# Patient Record
Sex: FEMALE | Race: WHITE | Employment: OTHER | URBAN - METROPOLITAN AREA
[De-identification: names, ages, dates, MRNs, and addresses within clinical notes are randomized per-mention and may not be internally consistent; named-entity substitution may affect disease eponyms.]

---

## 2017-02-27 ENCOUNTER — OFFICE VISIT (OUTPATIENT)
Dept: FAMILY MEDICINE CLINIC | Age: 72
End: 2017-02-27

## 2017-02-27 VITALS
HEART RATE: 68 BPM | SYSTOLIC BLOOD PRESSURE: 134 MMHG | OXYGEN SATURATION: 97 % | WEIGHT: 172.4 LBS | DIASTOLIC BLOOD PRESSURE: 70 MMHG | BODY MASS INDEX: 30.3 KG/M2

## 2017-02-27 DIAGNOSIS — I10 ESSENTIAL HYPERTENSION: ICD-10-CM

## 2017-02-27 DIAGNOSIS — E78.00 HYPERCHOLESTEREMIA: ICD-10-CM

## 2017-02-27 DIAGNOSIS — E55.9 VITAMIN D DEFICIENCY: ICD-10-CM

## 2017-02-27 DIAGNOSIS — E11.42 TYPE 2 DIABETES MELLITUS WITH DIABETIC POLYNEUROPATHY, WITHOUT LONG-TERM CURRENT USE OF INSULIN (HCC): Primary | ICD-10-CM

## 2017-02-27 LAB
ALT SERPL-CCNC: 13 U/L (ref 10–40)
ANION GAP SERPL CALCULATED.3IONS-SCNC: 16 MMOL/L (ref 3–16)
BUN BLDV-MCNC: 13 MG/DL (ref 7–20)
CALCIUM SERPL-MCNC: 9.5 MG/DL (ref 8.3–10.6)
CHLORIDE BLD-SCNC: 102 MMOL/L (ref 99–110)
CHOLESTEROL, TOTAL: 171 MG/DL (ref 0–199)
CO2: 23 MMOL/L (ref 21–32)
CREAT SERPL-MCNC: 0.7 MG/DL (ref 0.6–1.2)
GFR AFRICAN AMERICAN: >60
GFR NON-AFRICAN AMERICAN: >60
GLUCOSE BLD-MCNC: 148 MG/DL (ref 70–99)
HDLC SERPL-MCNC: 40 MG/DL (ref 40–60)
LDL CHOLESTEROL CALCULATED: 97 MG/DL
POTASSIUM SERPL-SCNC: 4.4 MMOL/L (ref 3.5–5.1)
SODIUM BLD-SCNC: 141 MMOL/L (ref 136–145)
TRIGL SERPL-MCNC: 168 MG/DL (ref 0–150)
TSH REFLEX: 2.86 UIU/ML (ref 0.27–4.2)
VITAMIN D 25-HYDROXY: 37.2 NG/ML
VLDLC SERPL CALC-MCNC: 34 MG/DL

## 2017-02-27 PROCEDURE — 1036F TOBACCO NON-USER: CPT | Performed by: FAMILY MEDICINE

## 2017-02-27 PROCEDURE — G8419 CALC BMI OUT NRM PARAM NOF/U: HCPCS | Performed by: FAMILY MEDICINE

## 2017-02-27 PROCEDURE — 4040F PNEUMOC VAC/ADMIN/RCVD: CPT | Performed by: FAMILY MEDICINE

## 2017-02-27 PROCEDURE — 1123F ACP DISCUSS/DSCN MKR DOCD: CPT | Performed by: FAMILY MEDICINE

## 2017-02-27 PROCEDURE — 3014F SCREEN MAMMO DOC REV: CPT | Performed by: FAMILY MEDICINE

## 2017-02-27 PROCEDURE — G8427 DOCREV CUR MEDS BY ELIG CLIN: HCPCS | Performed by: FAMILY MEDICINE

## 2017-02-27 PROCEDURE — 3044F HG A1C LEVEL LT 7.0%: CPT | Performed by: FAMILY MEDICINE

## 2017-02-27 PROCEDURE — 36415 COLL VENOUS BLD VENIPUNCTURE: CPT | Performed by: FAMILY MEDICINE

## 2017-02-27 PROCEDURE — 1090F PRES/ABSN URINE INCON ASSESS: CPT | Performed by: FAMILY MEDICINE

## 2017-02-27 PROCEDURE — G8484 FLU IMMUNIZE NO ADMIN: HCPCS | Performed by: FAMILY MEDICINE

## 2017-02-27 PROCEDURE — G8399 PT W/DXA RESULTS DOCUMENT: HCPCS | Performed by: FAMILY MEDICINE

## 2017-02-27 PROCEDURE — 99214 OFFICE O/P EST MOD 30 MIN: CPT | Performed by: FAMILY MEDICINE

## 2017-02-27 PROCEDURE — 3017F COLORECTAL CA SCREEN DOC REV: CPT | Performed by: FAMILY MEDICINE

## 2017-02-28 ENCOUNTER — TELEPHONE (OUTPATIENT)
Dept: FAMILY MEDICINE CLINIC | Age: 72
End: 2017-02-28

## 2017-02-28 LAB
ESTIMATED AVERAGE GLUCOSE: 145.6 MG/DL
HBA1C MFR BLD: 6.7 %

## 2017-02-28 RX ORDER — GLIPIZIDE 5 MG/1
15 TABLET ORAL
Qty: 180 TABLET | Refills: 5 | Status: SHIPPED | OUTPATIENT
Start: 2017-02-28 | End: 2017-10-31

## 2017-04-11 RX ORDER — ATORVASTATIN CALCIUM 20 MG/1
TABLET, FILM COATED ORAL
Qty: 90 TABLET | Refills: 1 | Status: SHIPPED | OUTPATIENT
Start: 2017-04-11 | End: 2017-11-13 | Stop reason: DRUGHIGH

## 2017-05-01 RX ORDER — ATENOLOL 25 MG/1
TABLET ORAL
Qty: 90 TABLET | Refills: 1 | Status: SHIPPED | OUTPATIENT
Start: 2017-05-01 | End: 2017-11-21 | Stop reason: SDUPTHER

## 2017-05-03 ENCOUNTER — TELEPHONE (OUTPATIENT)
Dept: FAMILY MEDICINE CLINIC | Age: 72
End: 2017-05-03

## 2017-05-03 RX ORDER — AZITHROMYCIN 250 MG/1
TABLET, FILM COATED ORAL
Qty: 1 PACKET | Refills: 0 | Status: SHIPPED | OUTPATIENT
Start: 2017-05-03 | End: 2017-05-13

## 2017-05-12 ENCOUNTER — OFFICE VISIT (OUTPATIENT)
Dept: FAMILY MEDICINE CLINIC | Age: 72
End: 2017-05-12

## 2017-05-12 VITALS
HEART RATE: 58 BPM | DIASTOLIC BLOOD PRESSURE: 58 MMHG | SYSTOLIC BLOOD PRESSURE: 138 MMHG | OXYGEN SATURATION: 97 % | WEIGHT: 174.8 LBS | BODY MASS INDEX: 30.72 KG/M2 | TEMPERATURE: 97.7 F

## 2017-05-12 DIAGNOSIS — M79.672 LEFT FOOT PAIN: ICD-10-CM

## 2017-05-12 DIAGNOSIS — E11.42 TYPE 2 DIABETES MELLITUS WITH DIABETIC POLYNEUROPATHY, WITHOUT LONG-TERM CURRENT USE OF INSULIN (HCC): ICD-10-CM

## 2017-05-12 DIAGNOSIS — J01.00 ACUTE NON-RECURRENT MAXILLARY SINUSITIS: Primary | ICD-10-CM

## 2017-05-12 PROCEDURE — G8399 PT W/DXA RESULTS DOCUMENT: HCPCS | Performed by: FAMILY MEDICINE

## 2017-05-12 PROCEDURE — 1036F TOBACCO NON-USER: CPT | Performed by: FAMILY MEDICINE

## 2017-05-12 PROCEDURE — 1123F ACP DISCUSS/DSCN MKR DOCD: CPT | Performed by: FAMILY MEDICINE

## 2017-05-12 PROCEDURE — G8419 CALC BMI OUT NRM PARAM NOF/U: HCPCS | Performed by: FAMILY MEDICINE

## 2017-05-12 PROCEDURE — G8427 DOCREV CUR MEDS BY ELIG CLIN: HCPCS | Performed by: FAMILY MEDICINE

## 2017-05-12 PROCEDURE — 1090F PRES/ABSN URINE INCON ASSESS: CPT | Performed by: FAMILY MEDICINE

## 2017-05-12 PROCEDURE — 4040F PNEUMOC VAC/ADMIN/RCVD: CPT | Performed by: FAMILY MEDICINE

## 2017-05-12 PROCEDURE — 99214 OFFICE O/P EST MOD 30 MIN: CPT | Performed by: FAMILY MEDICINE

## 2017-05-12 PROCEDURE — 3014F SCREEN MAMMO DOC REV: CPT | Performed by: FAMILY MEDICINE

## 2017-05-12 PROCEDURE — 3044F HG A1C LEVEL LT 7.0%: CPT | Performed by: FAMILY MEDICINE

## 2017-05-12 PROCEDURE — 3017F COLORECTAL CA SCREEN DOC REV: CPT | Performed by: FAMILY MEDICINE

## 2017-05-12 RX ORDER — DOXYCYCLINE HYCLATE 100 MG/1
100 CAPSULE ORAL 2 TIMES DAILY
Qty: 20 CAPSULE | Refills: 0 | Status: SHIPPED | OUTPATIENT
Start: 2017-05-12 | End: 2017-05-22

## 2017-05-12 RX ORDER — PREDNISONE 20 MG/1
20 TABLET ORAL DAILY
Qty: 10 TABLET | Refills: 0 | Status: SHIPPED | OUTPATIENT
Start: 2017-05-12 | End: 2017-05-22

## 2017-06-27 ENCOUNTER — OFFICE VISIT (OUTPATIENT)
Dept: FAMILY MEDICINE CLINIC | Age: 72
End: 2017-06-27

## 2017-06-27 VITALS
BODY MASS INDEX: 30.41 KG/M2 | OXYGEN SATURATION: 96 % | SYSTOLIC BLOOD PRESSURE: 120 MMHG | DIASTOLIC BLOOD PRESSURE: 70 MMHG | WEIGHT: 171.6 LBS | HEIGHT: 63 IN | TEMPERATURE: 97.9 F | HEART RATE: 51 BPM

## 2017-06-27 DIAGNOSIS — Z12.31 SCREENING MAMMOGRAM, ENCOUNTER FOR: ICD-10-CM

## 2017-06-27 DIAGNOSIS — Z13.9 SCREENING: ICD-10-CM

## 2017-06-27 DIAGNOSIS — E78.00 HYPERCHOLESTEREMIA: ICD-10-CM

## 2017-06-27 DIAGNOSIS — I10 ESSENTIAL HYPERTENSION: ICD-10-CM

## 2017-06-27 DIAGNOSIS — Z13.9 SCREENING FOR CONDITION: ICD-10-CM

## 2017-06-27 DIAGNOSIS — E55.9 VITAMIN D DEFICIENCY: ICD-10-CM

## 2017-06-27 DIAGNOSIS — E11.42 TYPE 2 DIABETES MELLITUS WITH DIABETIC POLYNEUROPATHY, WITHOUT LONG-TERM CURRENT USE OF INSULIN (HCC): Primary | ICD-10-CM

## 2017-06-27 LAB
ALT SERPL-CCNC: 12 U/L (ref 10–40)
ANION GAP SERPL CALCULATED.3IONS-SCNC: 15 MMOL/L (ref 3–16)
BUN BLDV-MCNC: 13 MG/DL (ref 7–20)
CALCIUM SERPL-MCNC: 9.7 MG/DL (ref 8.3–10.6)
CHLORIDE BLD-SCNC: 102 MMOL/L (ref 99–110)
CHOLESTEROL, TOTAL: 163 MG/DL (ref 0–199)
CO2: 25 MMOL/L (ref 21–32)
CREAT SERPL-MCNC: 0.7 MG/DL (ref 0.6–1.2)
CREATININE URINE POCT: 200
GFR AFRICAN AMERICAN: >60
GFR NON-AFRICAN AMERICAN: >60
GLUCOSE BLD-MCNC: 142 MG/DL (ref 70–99)
HDLC SERPL-MCNC: 37 MG/DL (ref 40–60)
LDL CHOLESTEROL CALCULATED: 86 MG/DL
MICROALBUMIN/CREAT 24H UR: 30 MG/G{CREAT}
MICROALBUMIN/CREAT UR-RTO: NORMAL
POTASSIUM SERPL-SCNC: 4.3 MMOL/L (ref 3.5–5.1)
SODIUM BLD-SCNC: 142 MMOL/L (ref 136–145)
TRIGL SERPL-MCNC: 201 MG/DL (ref 0–150)
TSH REFLEX: 2.74 UIU/ML (ref 0.27–4.2)
VITAMIN D 25-HYDROXY: 39.1 NG/ML
VLDLC SERPL CALC-MCNC: 40 MG/DL

## 2017-06-27 PROCEDURE — 3046F HEMOGLOBIN A1C LEVEL >9.0%: CPT | Performed by: FAMILY MEDICINE

## 2017-06-27 PROCEDURE — G8417 CALC BMI ABV UP PARAM F/U: HCPCS | Performed by: FAMILY MEDICINE

## 2017-06-27 PROCEDURE — G8399 PT W/DXA RESULTS DOCUMENT: HCPCS | Performed by: FAMILY MEDICINE

## 2017-06-27 PROCEDURE — 99214 OFFICE O/P EST MOD 30 MIN: CPT | Performed by: FAMILY MEDICINE

## 2017-06-27 PROCEDURE — 1123F ACP DISCUSS/DSCN MKR DOCD: CPT | Performed by: FAMILY MEDICINE

## 2017-06-27 PROCEDURE — 1036F TOBACCO NON-USER: CPT | Performed by: FAMILY MEDICINE

## 2017-06-27 PROCEDURE — G8427 DOCREV CUR MEDS BY ELIG CLIN: HCPCS | Performed by: FAMILY MEDICINE

## 2017-06-27 PROCEDURE — 3014F SCREEN MAMMO DOC REV: CPT | Performed by: FAMILY MEDICINE

## 2017-06-27 PROCEDURE — 4040F PNEUMOC VAC/ADMIN/RCVD: CPT | Performed by: FAMILY MEDICINE

## 2017-06-27 PROCEDURE — 82044 UR ALBUMIN SEMIQUANTITATIVE: CPT | Performed by: FAMILY MEDICINE

## 2017-06-27 PROCEDURE — 3017F COLORECTAL CA SCREEN DOC REV: CPT | Performed by: FAMILY MEDICINE

## 2017-06-27 PROCEDURE — 36415 COLL VENOUS BLD VENIPUNCTURE: CPT | Performed by: FAMILY MEDICINE

## 2017-06-27 PROCEDURE — 1090F PRES/ABSN URINE INCON ASSESS: CPT | Performed by: FAMILY MEDICINE

## 2017-06-28 LAB
ESTIMATED AVERAGE GLUCOSE: 157.1 MG/DL
HBA1C MFR BLD: 7.1 %

## 2017-07-10 ENCOUNTER — HOSPITAL ENCOUNTER (OUTPATIENT)
Dept: MAMMOGRAPHY | Age: 72
Discharge: HOME | End: 2017-07-10
Attending: FAMILY MEDICINE | Admitting: FAMILY MEDICINE

## 2017-07-10 DIAGNOSIS — Z12.31 ENCOUNTER FOR SCREENING MAMMOGRAM FOR BREAST CANCER: ICD-10-CM

## 2017-09-06 ENCOUNTER — TELEPHONE (OUTPATIENT)
Dept: FAMILY MEDICINE CLINIC | Age: 72
End: 2017-09-06

## 2017-10-20 ENCOUNTER — TELEPHONE (OUTPATIENT)
Dept: OTHER | Facility: CLINIC | Age: 72
End: 2017-10-20

## 2017-10-31 ENCOUNTER — OFFICE VISIT (OUTPATIENT)
Dept: FAMILY MEDICINE CLINIC | Age: 72
End: 2017-10-31

## 2017-10-31 VITALS
TEMPERATURE: 97.5 F | SYSTOLIC BLOOD PRESSURE: 136 MMHG | HEART RATE: 53 BPM | WEIGHT: 174.6 LBS | DIASTOLIC BLOOD PRESSURE: 62 MMHG | BODY MASS INDEX: 30.68 KG/M2 | OXYGEN SATURATION: 96 %

## 2017-10-31 DIAGNOSIS — E55.9 VITAMIN D DEFICIENCY: ICD-10-CM

## 2017-10-31 DIAGNOSIS — E11.42 TYPE 2 DIABETES MELLITUS WITH DIABETIC POLYNEUROPATHY, WITHOUT LONG-TERM CURRENT USE OF INSULIN (HCC): Primary | ICD-10-CM

## 2017-10-31 DIAGNOSIS — I10 ESSENTIAL HYPERTENSION: ICD-10-CM

## 2017-10-31 DIAGNOSIS — M19.90 ARTHRITIS: ICD-10-CM

## 2017-10-31 LAB
ALT SERPL-CCNC: 13 U/L (ref 10–40)
ANION GAP SERPL CALCULATED.3IONS-SCNC: 14 MMOL/L (ref 3–16)
BUN BLDV-MCNC: 13 MG/DL (ref 7–20)
CALCIUM SERPL-MCNC: 9.6 MG/DL (ref 8.3–10.6)
CHLORIDE BLD-SCNC: 103 MMOL/L (ref 99–110)
CHOLESTEROL, TOTAL: 184 MG/DL (ref 0–199)
CO2: 24 MMOL/L (ref 21–32)
CREAT SERPL-MCNC: 0.7 MG/DL (ref 0.6–1.2)
GFR AFRICAN AMERICAN: >60
GFR NON-AFRICAN AMERICAN: >60
GLUCOSE BLD-MCNC: 162 MG/DL (ref 70–99)
HDLC SERPL-MCNC: 35 MG/DL (ref 40–60)
LDL CHOLESTEROL CALCULATED: 112 MG/DL
POTASSIUM SERPL-SCNC: 4.7 MMOL/L (ref 3.5–5.1)
SODIUM BLD-SCNC: 141 MMOL/L (ref 136–145)
TRIGL SERPL-MCNC: 184 MG/DL (ref 0–150)
TSH REFLEX: 3.48 UIU/ML (ref 0.27–4.2)
VITAMIN D 25-HYDROXY: 42.9 NG/ML
VLDLC SERPL CALC-MCNC: 37 MG/DL

## 2017-10-31 PROCEDURE — 3017F COLORECTAL CA SCREEN DOC REV: CPT | Performed by: FAMILY MEDICINE

## 2017-10-31 PROCEDURE — 4040F PNEUMOC VAC/ADMIN/RCVD: CPT | Performed by: FAMILY MEDICINE

## 2017-10-31 PROCEDURE — G8417 CALC BMI ABV UP PARAM F/U: HCPCS | Performed by: FAMILY MEDICINE

## 2017-10-31 PROCEDURE — 1036F TOBACCO NON-USER: CPT | Performed by: FAMILY MEDICINE

## 2017-10-31 PROCEDURE — 3045F PR MOST RECENT HEMOGLOBIN A1C LEVEL 7.0-9.0%: CPT | Performed by: FAMILY MEDICINE

## 2017-10-31 PROCEDURE — 1123F ACP DISCUSS/DSCN MKR DOCD: CPT | Performed by: FAMILY MEDICINE

## 2017-10-31 PROCEDURE — 99214 OFFICE O/P EST MOD 30 MIN: CPT | Performed by: FAMILY MEDICINE

## 2017-10-31 PROCEDURE — 3014F SCREEN MAMMO DOC REV: CPT | Performed by: FAMILY MEDICINE

## 2017-10-31 PROCEDURE — G0008 ADMIN INFLUENZA VIRUS VAC: HCPCS | Performed by: FAMILY MEDICINE

## 2017-10-31 PROCEDURE — G8484 FLU IMMUNIZE NO ADMIN: HCPCS | Performed by: FAMILY MEDICINE

## 2017-10-31 PROCEDURE — G8399 PT W/DXA RESULTS DOCUMENT: HCPCS | Performed by: FAMILY MEDICINE

## 2017-10-31 PROCEDURE — 36415 COLL VENOUS BLD VENIPUNCTURE: CPT | Performed by: FAMILY MEDICINE

## 2017-10-31 PROCEDURE — 90688 IIV4 VACCINE SPLT 0.5 ML IM: CPT | Performed by: FAMILY MEDICINE

## 2017-10-31 PROCEDURE — 1090F PRES/ABSN URINE INCON ASSESS: CPT | Performed by: FAMILY MEDICINE

## 2017-10-31 PROCEDURE — G8427 DOCREV CUR MEDS BY ELIG CLIN: HCPCS | Performed by: FAMILY MEDICINE

## 2017-10-31 RX ORDER — GLIPIZIDE 10 MG/1
TABLET ORAL
Qty: 180 TABLET | Refills: 3
Start: 2017-10-31 | End: 2018-02-28 | Stop reason: SDUPTHER

## 2017-10-31 ASSESSMENT — PATIENT HEALTH QUESTIONNAIRE - PHQ9
SUM OF ALL RESPONSES TO PHQ9 QUESTIONS 1 & 2: 0
SUM OF ALL RESPONSES TO PHQ QUESTIONS 1-9: 0
2. FEELING DOWN, DEPRESSED OR HOPELESS: 0
1. LITTLE INTEREST OR PLEASURE IN DOING THINGS: 0

## 2017-10-31 NOTE — PATIENT INSTRUCTIONS
Please read the healthy family handout that you were given and share it with your family. Please compare this printed medication list with your medications at home to be sure they are the same. If you have any medications that are different please contact us immediately at [phone]. Also review your allergies that we have listed, these may also include medications that you have not been able to tolerate, make sure everything listed is correct. If you have any allergies that are different please contact us immediately at [phone].

## 2017-10-31 NOTE — PROGRESS NOTES
use  Careful medical compliance  Proper diet and weight management   Otherwise continue current treatment plan  Call or return if symptoms are not well controlled  Go to ED if severe/significant symptoms occur    All medical conditions for this patient are stable unless otherwise indicated    Chong Peres MD    This note was transcribed using a voice recognition software system. Proper technique and careful oversight were used to increase transcription accuracy but inadvertent errors may be present.

## 2017-11-01 LAB
ESTIMATED AVERAGE GLUCOSE: 151.3 MG/DL
HBA1C MFR BLD: 6.9 %

## 2017-11-13 RX ORDER — ATORVASTATIN CALCIUM 20 MG/1
TABLET, FILM COATED ORAL
Qty: 14 TABLET | Refills: 0 | OUTPATIENT
Start: 2017-11-13

## 2017-11-13 RX ORDER — ATORVASTATIN CALCIUM 40 MG/1
40 TABLET, FILM COATED ORAL DAILY
Qty: 30 TABLET | Refills: 0 | Status: SHIPPED | OUTPATIENT
Start: 2017-11-13 | End: 2018-09-04 | Stop reason: SDUPTHER

## 2017-11-13 RX ORDER — ATORVASTATIN CALCIUM 40 MG/1
40 TABLET, FILM COATED ORAL DAILY
Qty: 90 TABLET | Refills: 0 | Status: SHIPPED | OUTPATIENT
Start: 2017-11-13 | End: 2018-02-28

## 2017-11-21 RX ORDER — ATENOLOL 25 MG/1
TABLET ORAL
Qty: 90 TABLET | Refills: 1 | Status: SHIPPED | OUTPATIENT
Start: 2017-11-21 | End: 2018-05-28 | Stop reason: SDUPTHER

## 2018-01-15 RX ORDER — SITAGLIPTIN 50 MG/1
TABLET, FILM COATED ORAL
Qty: 90 TABLET | Refills: 1 | Status: SHIPPED | OUTPATIENT
Start: 2018-01-15 | End: 2018-07-10 | Stop reason: SDUPTHER

## 2018-02-28 ENCOUNTER — OFFICE VISIT (OUTPATIENT)
Dept: FAMILY MEDICINE CLINIC | Age: 73
End: 2018-02-28

## 2018-02-28 VITALS
TEMPERATURE: 97.4 F | HEART RATE: 61 BPM | OXYGEN SATURATION: 96 % | DIASTOLIC BLOOD PRESSURE: 66 MMHG | HEIGHT: 64 IN | WEIGHT: 172.6 LBS | BODY MASS INDEX: 29.47 KG/M2 | SYSTOLIC BLOOD PRESSURE: 110 MMHG

## 2018-02-28 DIAGNOSIS — I10 ESSENTIAL HYPERTENSION: ICD-10-CM

## 2018-02-28 DIAGNOSIS — I34.0 MITRAL VALVE INSUFFICIENCY, UNSPECIFIED ETIOLOGY: ICD-10-CM

## 2018-02-28 DIAGNOSIS — E11.42 TYPE 2 DIABETES MELLITUS WITH DIABETIC POLYNEUROPATHY, WITHOUT LONG-TERM CURRENT USE OF INSULIN (HCC): Primary | ICD-10-CM

## 2018-02-28 DIAGNOSIS — E78.00 HYPERCHOLESTEREMIA: ICD-10-CM

## 2018-02-28 DIAGNOSIS — E55.9 VITAMIN D DEFICIENCY: ICD-10-CM

## 2018-02-28 LAB
ALT SERPL-CCNC: 15 U/L (ref 10–40)
ANION GAP SERPL CALCULATED.3IONS-SCNC: 14 MMOL/L (ref 3–16)
BUN BLDV-MCNC: 12 MG/DL (ref 7–20)
CALCIUM SERPL-MCNC: 9.1 MG/DL (ref 8.3–10.6)
CHLORIDE BLD-SCNC: 104 MMOL/L (ref 99–110)
CHOLESTEROL, TOTAL: 159 MG/DL (ref 0–199)
CO2: 26 MMOL/L (ref 21–32)
CREAT SERPL-MCNC: 0.6 MG/DL (ref 0.6–1.2)
GFR AFRICAN AMERICAN: >60
GFR NON-AFRICAN AMERICAN: >60
GLUCOSE BLD-MCNC: 158 MG/DL (ref 70–99)
HDLC SERPL-MCNC: 38 MG/DL (ref 40–60)
LDL CHOLESTEROL CALCULATED: 85 MG/DL
POTASSIUM SERPL-SCNC: 4.6 MMOL/L (ref 3.5–5.1)
SODIUM BLD-SCNC: 144 MMOL/L (ref 136–145)
TRIGL SERPL-MCNC: 180 MG/DL (ref 0–150)
TSH REFLEX: 3.53 UIU/ML (ref 0.27–4.2)
VLDLC SERPL CALC-MCNC: 36 MG/DL

## 2018-02-28 PROCEDURE — 3046F HEMOGLOBIN A1C LEVEL >9.0%: CPT | Performed by: FAMILY MEDICINE

## 2018-02-28 PROCEDURE — G8399 PT W/DXA RESULTS DOCUMENT: HCPCS | Performed by: FAMILY MEDICINE

## 2018-02-28 PROCEDURE — 36415 COLL VENOUS BLD VENIPUNCTURE: CPT | Performed by: FAMILY MEDICINE

## 2018-02-28 PROCEDURE — 4040F PNEUMOC VAC/ADMIN/RCVD: CPT | Performed by: FAMILY MEDICINE

## 2018-02-28 PROCEDURE — 99214 OFFICE O/P EST MOD 30 MIN: CPT | Performed by: FAMILY MEDICINE

## 2018-02-28 PROCEDURE — 3017F COLORECTAL CA SCREEN DOC REV: CPT | Performed by: FAMILY MEDICINE

## 2018-02-28 PROCEDURE — G8417 CALC BMI ABV UP PARAM F/U: HCPCS | Performed by: FAMILY MEDICINE

## 2018-02-28 PROCEDURE — G8427 DOCREV CUR MEDS BY ELIG CLIN: HCPCS | Performed by: FAMILY MEDICINE

## 2018-02-28 PROCEDURE — 1123F ACP DISCUSS/DSCN MKR DOCD: CPT | Performed by: FAMILY MEDICINE

## 2018-02-28 PROCEDURE — G8484 FLU IMMUNIZE NO ADMIN: HCPCS | Performed by: FAMILY MEDICINE

## 2018-02-28 PROCEDURE — 1090F PRES/ABSN URINE INCON ASSESS: CPT | Performed by: FAMILY MEDICINE

## 2018-02-28 PROCEDURE — 1036F TOBACCO NON-USER: CPT | Performed by: FAMILY MEDICINE

## 2018-02-28 PROCEDURE — 3014F SCREEN MAMMO DOC REV: CPT | Performed by: FAMILY MEDICINE

## 2018-02-28 RX ORDER — GLIPIZIDE 10 MG/1
TABLET ORAL
Qty: 360 TABLET | Refills: 3 | Status: SHIPPED | OUTPATIENT
Start: 2018-02-28 | End: 2018-10-08 | Stop reason: SDUPTHER

## 2018-02-28 NOTE — PROGRESS NOTES
compliance  Proper diet and weight management   Otherwise continue current treatment plan  Call or return if symptoms are not well controlled  Go to ED if severe/significant symptoms occur    All medical conditions for this patient are stable unless otherwise indicated    Viviana Petersen MD    This note was transcribed using a voice recognition software system. Proper technique and careful oversight were used to increase transcription accuracy but inadvertent errors may be present.

## 2018-03-01 LAB
ESTIMATED AVERAGE GLUCOSE: 142.7 MG/DL
HBA1C MFR BLD: 6.6 %
VITAMIN D 25-HYDROXY: 49.8 NG/ML

## 2018-03-12 RX ORDER — ATORVASTATIN CALCIUM 40 MG/1
TABLET, FILM COATED ORAL
Qty: 90 TABLET | Refills: 1 | Status: SHIPPED | OUTPATIENT
Start: 2018-03-12 | End: 2018-05-17

## 2018-03-26 ENCOUNTER — TELEPHONE (OUTPATIENT)
Dept: FAMILY MEDICINE CLINIC | Age: 73
End: 2018-03-26

## 2018-03-26 RX ORDER — HYDROXYZINE HYDROCHLORIDE 25 MG/1
25 TABLET, FILM COATED ORAL 3 TIMES DAILY PRN
Qty: 40 TABLET | Refills: 0 | Status: SHIPPED | OUTPATIENT
Start: 2018-03-26 | End: 2018-05-17

## 2018-03-26 NOTE — TELEPHONE ENCOUNTER
Pt states she is having B/L eye swelling and itching rash on arms x 2 days. No SOB or trouble swallowing. She did work in yard 2 weeks ago but no problems until 2 days ago. You have no appointments left today do you want her to be seen or can you call her in something?  Thank you

## 2018-03-28 ENCOUNTER — OFFICE VISIT (OUTPATIENT)
Dept: FAMILY MEDICINE CLINIC | Age: 73
End: 2018-03-28

## 2018-03-28 VITALS
HEART RATE: 59 BPM | TEMPERATURE: 98.8 F | OXYGEN SATURATION: 94 % | DIASTOLIC BLOOD PRESSURE: 71 MMHG | SYSTOLIC BLOOD PRESSURE: 112 MMHG

## 2018-03-28 DIAGNOSIS — L23.9 ALLERGIC DERMATITIS: Primary | ICD-10-CM

## 2018-03-28 PROCEDURE — 1036F TOBACCO NON-USER: CPT | Performed by: FAMILY MEDICINE

## 2018-03-28 PROCEDURE — G8399 PT W/DXA RESULTS DOCUMENT: HCPCS | Performed by: FAMILY MEDICINE

## 2018-03-28 PROCEDURE — 1123F ACP DISCUSS/DSCN MKR DOCD: CPT | Performed by: FAMILY MEDICINE

## 2018-03-28 PROCEDURE — G8417 CALC BMI ABV UP PARAM F/U: HCPCS | Performed by: FAMILY MEDICINE

## 2018-03-28 PROCEDURE — 3017F COLORECTAL CA SCREEN DOC REV: CPT | Performed by: FAMILY MEDICINE

## 2018-03-28 PROCEDURE — 99213 OFFICE O/P EST LOW 20 MIN: CPT | Performed by: FAMILY MEDICINE

## 2018-03-28 PROCEDURE — G8482 FLU IMMUNIZE ORDER/ADMIN: HCPCS | Performed by: FAMILY MEDICINE

## 2018-03-28 PROCEDURE — 1090F PRES/ABSN URINE INCON ASSESS: CPT | Performed by: FAMILY MEDICINE

## 2018-03-28 PROCEDURE — 4040F PNEUMOC VAC/ADMIN/RCVD: CPT | Performed by: FAMILY MEDICINE

## 2018-03-28 PROCEDURE — 3014F SCREEN MAMMO DOC REV: CPT | Performed by: FAMILY MEDICINE

## 2018-03-28 PROCEDURE — G8427 DOCREV CUR MEDS BY ELIG CLIN: HCPCS | Performed by: FAMILY MEDICINE

## 2018-03-28 RX ORDER — PREDNISONE 20 MG/1
40 TABLET ORAL DAILY
Qty: 20 TABLET | Refills: 0 | Status: SHIPPED | OUTPATIENT
Start: 2018-03-28 | End: 2018-04-07

## 2018-04-06 ENCOUNTER — NURSE ONLY (OUTPATIENT)
Dept: FAMILY MEDICINE CLINIC | Age: 73
End: 2018-04-06

## 2018-04-06 DIAGNOSIS — Z13.9 SCREENING FOR CONDITION: ICD-10-CM

## 2018-04-06 LAB
CONTROL: NORMAL
HEMOCCULT STL QL: NEGATIVE

## 2018-04-06 PROCEDURE — 82274 ASSAY TEST FOR BLOOD FECAL: CPT | Performed by: FAMILY MEDICINE

## 2018-05-14 ENCOUNTER — TELEPHONE (OUTPATIENT)
Dept: FAMILY MEDICINE CLINIC | Age: 73
End: 2018-05-14

## 2018-05-17 ENCOUNTER — OFFICE VISIT (OUTPATIENT)
Dept: FAMILY MEDICINE CLINIC | Age: 73
End: 2018-05-17

## 2018-05-17 VITALS
WEIGHT: 175.8 LBS | BODY MASS INDEX: 30.65 KG/M2 | DIASTOLIC BLOOD PRESSURE: 69 MMHG | OXYGEN SATURATION: 94 % | SYSTOLIC BLOOD PRESSURE: 131 MMHG | TEMPERATURE: 98.3 F | HEART RATE: 59 BPM

## 2018-05-17 DIAGNOSIS — I10 ESSENTIAL HYPERTENSION: ICD-10-CM

## 2018-05-17 DIAGNOSIS — M19.90 ARTHRITIS: ICD-10-CM

## 2018-05-17 DIAGNOSIS — R60.0 LOWER EXTREMITY EDEMA: Primary | ICD-10-CM

## 2018-05-17 PROCEDURE — 1123F ACP DISCUSS/DSCN MKR DOCD: CPT | Performed by: FAMILY MEDICINE

## 2018-05-17 PROCEDURE — G8417 CALC BMI ABV UP PARAM F/U: HCPCS | Performed by: FAMILY MEDICINE

## 2018-05-17 PROCEDURE — 99214 OFFICE O/P EST MOD 30 MIN: CPT | Performed by: FAMILY MEDICINE

## 2018-05-17 PROCEDURE — 4040F PNEUMOC VAC/ADMIN/RCVD: CPT | Performed by: FAMILY MEDICINE

## 2018-05-17 PROCEDURE — G8427 DOCREV CUR MEDS BY ELIG CLIN: HCPCS | Performed by: FAMILY MEDICINE

## 2018-05-17 PROCEDURE — 1036F TOBACCO NON-USER: CPT | Performed by: FAMILY MEDICINE

## 2018-05-17 PROCEDURE — 1090F PRES/ABSN URINE INCON ASSESS: CPT | Performed by: FAMILY MEDICINE

## 2018-05-17 PROCEDURE — G8399 PT W/DXA RESULTS DOCUMENT: HCPCS | Performed by: FAMILY MEDICINE

## 2018-05-17 PROCEDURE — 3017F COLORECTAL CA SCREEN DOC REV: CPT | Performed by: FAMILY MEDICINE

## 2018-05-17 RX ORDER — FUROSEMIDE 20 MG/1
TABLET ORAL
Qty: 30 TABLET | Refills: 3 | Status: SHIPPED | OUTPATIENT
Start: 2018-05-17 | End: 2018-09-25 | Stop reason: SDUPTHER

## 2018-05-29 RX ORDER — ATENOLOL 25 MG/1
TABLET ORAL
Qty: 90 TABLET | Refills: 1 | Status: SHIPPED | OUTPATIENT
Start: 2018-05-29 | End: 2018-11-19 | Stop reason: SDUPTHER

## 2018-07-10 RX ORDER — SITAGLIPTIN 50 MG/1
TABLET, FILM COATED ORAL
Qty: 90 TABLET | Refills: 1 | Status: SHIPPED | OUTPATIENT
Start: 2018-07-10 | End: 2018-12-28 | Stop reason: SDUPTHER

## 2018-07-24 ENCOUNTER — HOSPITAL ENCOUNTER (OUTPATIENT)
Dept: MAMMOGRAPHY | Age: 73
Discharge: HOME | End: 2018-07-29
Payer: MEDICARE

## 2018-07-24 DIAGNOSIS — Z12.31 VISIT FOR SCREENING MAMMOGRAM: ICD-10-CM

## 2018-07-24 PROCEDURE — 77067 SCR MAMMO BI INCL CAD: CPT

## 2018-07-30 ENCOUNTER — OFFICE VISIT (OUTPATIENT)
Dept: FAMILY MEDICINE CLINIC | Age: 73
End: 2018-07-30

## 2018-07-30 VITALS
TEMPERATURE: 98.2 F | BODY MASS INDEX: 30.27 KG/M2 | OXYGEN SATURATION: 95 % | HEART RATE: 56 BPM | WEIGHT: 173.6 LBS | SYSTOLIC BLOOD PRESSURE: 131 MMHG | DIASTOLIC BLOOD PRESSURE: 68 MMHG

## 2018-07-30 DIAGNOSIS — R60.0 LOWER EXTREMITY EDEMA: ICD-10-CM

## 2018-07-30 DIAGNOSIS — H10.33 ACUTE BACTERIAL CONJUNCTIVITIS OF BOTH EYES: Primary | ICD-10-CM

## 2018-07-30 PROCEDURE — 3017F COLORECTAL CA SCREEN DOC REV: CPT | Performed by: FAMILY MEDICINE

## 2018-07-30 PROCEDURE — 1101F PT FALLS ASSESS-DOCD LE1/YR: CPT | Performed by: FAMILY MEDICINE

## 2018-07-30 PROCEDURE — 1090F PRES/ABSN URINE INCON ASSESS: CPT | Performed by: FAMILY MEDICINE

## 2018-07-30 PROCEDURE — 1036F TOBACCO NON-USER: CPT | Performed by: FAMILY MEDICINE

## 2018-07-30 PROCEDURE — G8427 DOCREV CUR MEDS BY ELIG CLIN: HCPCS | Performed by: FAMILY MEDICINE

## 2018-07-30 PROCEDURE — G8417 CALC BMI ABV UP PARAM F/U: HCPCS | Performed by: FAMILY MEDICINE

## 2018-07-30 PROCEDURE — 99214 OFFICE O/P EST MOD 30 MIN: CPT | Performed by: FAMILY MEDICINE

## 2018-07-30 PROCEDURE — 1123F ACP DISCUSS/DSCN MKR DOCD: CPT | Performed by: FAMILY MEDICINE

## 2018-07-30 PROCEDURE — G8399 PT W/DXA RESULTS DOCUMENT: HCPCS | Performed by: FAMILY MEDICINE

## 2018-07-30 PROCEDURE — 4040F PNEUMOC VAC/ADMIN/RCVD: CPT | Performed by: FAMILY MEDICINE

## 2018-07-30 RX ORDER — SULFACETAMIDE SODIUM 100 MG/ML
2 SOLUTION/ DROPS OPHTHALMIC 4 TIMES DAILY
Qty: 1 BOTTLE | Refills: 0 | Status: SHIPPED | OUTPATIENT
Start: 2018-07-30 | End: 2018-08-09

## 2018-08-09 ENCOUNTER — OFFICE VISIT (OUTPATIENT)
Dept: FAMILY MEDICINE CLINIC | Age: 73
End: 2018-08-09

## 2018-08-09 VITALS
HEART RATE: 56 BPM | DIASTOLIC BLOOD PRESSURE: 55 MMHG | OXYGEN SATURATION: 95 % | WEIGHT: 170.8 LBS | SYSTOLIC BLOOD PRESSURE: 120 MMHG | BODY MASS INDEX: 30.26 KG/M2 | TEMPERATURE: 98.7 F | HEIGHT: 63 IN

## 2018-08-09 DIAGNOSIS — I10 ESSENTIAL HYPERTENSION: ICD-10-CM

## 2018-08-09 DIAGNOSIS — E11.42 TYPE 2 DIABETES MELLITUS WITH DIABETIC POLYNEUROPATHY, WITHOUT LONG-TERM CURRENT USE OF INSULIN (HCC): Primary | ICD-10-CM

## 2018-08-09 DIAGNOSIS — E78.00 HYPERCHOLESTEREMIA: ICD-10-CM

## 2018-08-09 DIAGNOSIS — E55.9 VITAMIN D DEFICIENCY: ICD-10-CM

## 2018-08-09 DIAGNOSIS — R60.0 LOWER EXTREMITY EDEMA: ICD-10-CM

## 2018-08-09 LAB
ALT SERPL-CCNC: 16 U/L (ref 10–40)
ANION GAP SERPL CALCULATED.3IONS-SCNC: 13 MMOL/L (ref 3–16)
BUN BLDV-MCNC: 13 MG/DL (ref 7–20)
CALCIUM SERPL-MCNC: 9.9 MG/DL (ref 8.3–10.6)
CHLORIDE BLD-SCNC: 103 MMOL/L (ref 99–110)
CHOLESTEROL, TOTAL: 157 MG/DL (ref 0–199)
CO2: 24 MMOL/L (ref 21–32)
CREAT SERPL-MCNC: 0.7 MG/DL (ref 0.6–1.2)
CREATININE URINE POCT: 100
GFR AFRICAN AMERICAN: >60
GFR NON-AFRICAN AMERICAN: >60
GLUCOSE BLD-MCNC: 169 MG/DL (ref 70–99)
HDLC SERPL-MCNC: 40 MG/DL (ref 40–60)
LDL CHOLESTEROL CALCULATED: 88 MG/DL
MICROALBUMIN/CREAT 24H UR: 10 MG/G{CREAT}
MICROALBUMIN/CREAT UR-RTO: NORMAL
POTASSIUM SERPL-SCNC: 4.5 MMOL/L (ref 3.5–5.1)
SODIUM BLD-SCNC: 140 MMOL/L (ref 136–145)
TRIGL SERPL-MCNC: 145 MG/DL (ref 0–150)
TSH REFLEX: 3.22 UIU/ML (ref 0.27–4.2)
VLDLC SERPL CALC-MCNC: 29 MG/DL

## 2018-08-09 PROCEDURE — 3044F HG A1C LEVEL LT 7.0%: CPT | Performed by: FAMILY MEDICINE

## 2018-08-09 PROCEDURE — 99214 OFFICE O/P EST MOD 30 MIN: CPT | Performed by: FAMILY MEDICINE

## 2018-08-09 PROCEDURE — G8417 CALC BMI ABV UP PARAM F/U: HCPCS | Performed by: FAMILY MEDICINE

## 2018-08-09 PROCEDURE — 1101F PT FALLS ASSESS-DOCD LE1/YR: CPT | Performed by: FAMILY MEDICINE

## 2018-08-09 PROCEDURE — G8399 PT W/DXA RESULTS DOCUMENT: HCPCS | Performed by: FAMILY MEDICINE

## 2018-08-09 PROCEDURE — 1123F ACP DISCUSS/DSCN MKR DOCD: CPT | Performed by: FAMILY MEDICINE

## 2018-08-09 PROCEDURE — 3288F FALL RISK ASSESSMENT DOCD: CPT | Performed by: FAMILY MEDICINE

## 2018-08-09 PROCEDURE — 82044 UR ALBUMIN SEMIQUANTITATIVE: CPT | Performed by: FAMILY MEDICINE

## 2018-08-09 PROCEDURE — 1090F PRES/ABSN URINE INCON ASSESS: CPT | Performed by: FAMILY MEDICINE

## 2018-08-09 PROCEDURE — 1036F TOBACCO NON-USER: CPT | Performed by: FAMILY MEDICINE

## 2018-08-09 PROCEDURE — 2022F DILAT RTA XM EVC RTNOPTHY: CPT | Performed by: FAMILY MEDICINE

## 2018-08-09 PROCEDURE — 4040F PNEUMOC VAC/ADMIN/RCVD: CPT | Performed by: FAMILY MEDICINE

## 2018-08-09 PROCEDURE — 3017F COLORECTAL CA SCREEN DOC REV: CPT | Performed by: FAMILY MEDICINE

## 2018-08-09 PROCEDURE — G8427 DOCREV CUR MEDS BY ELIG CLIN: HCPCS | Performed by: FAMILY MEDICINE

## 2018-08-09 PROCEDURE — 36415 COLL VENOUS BLD VENIPUNCTURE: CPT | Performed by: FAMILY MEDICINE

## 2018-08-09 RX ORDER — TOBRAMYCIN AND DEXAMETHASONE 3; 1 MG/ML; MG/ML
1 SUSPENSION/ DROPS OPHTHALMIC
Qty: 1 BOTTLE | Refills: 0 | Status: SHIPPED | OUTPATIENT
Start: 2018-08-09 | End: 2018-08-19

## 2018-08-10 LAB
ESTIMATED AVERAGE GLUCOSE: 157.1 MG/DL
HBA1C MFR BLD: 7.1 %
VITAMIN D 25-HYDROXY: 52 NG/ML

## 2018-09-04 RX ORDER — ATORVASTATIN CALCIUM 40 MG/1
TABLET, FILM COATED ORAL
Qty: 90 TABLET | Refills: 1 | Status: SHIPPED | OUTPATIENT
Start: 2018-09-04 | End: 2019-02-13 | Stop reason: SDUPTHER

## 2018-09-25 RX ORDER — FUROSEMIDE 20 MG/1
TABLET ORAL
Qty: 30 TABLET | Refills: 3 | Status: SHIPPED | OUTPATIENT
Start: 2018-09-25 | End: 2019-03-28 | Stop reason: SDUPTHER

## 2018-09-25 NOTE — TELEPHONE ENCOUNTER
Refilled medication per verbal order from MD.  Future appt scheduled 11/01/2018  Last appt 08/09/2018

## 2018-09-28 ENCOUNTER — NURSE ONLY (OUTPATIENT)
Dept: FAMILY MEDICINE CLINIC | Age: 73
End: 2018-09-28
Payer: MEDICARE

## 2018-09-28 DIAGNOSIS — Z23 NEED FOR INFLUENZA VACCINATION: Primary | ICD-10-CM

## 2018-09-28 PROCEDURE — G0008 ADMIN INFLUENZA VIRUS VAC: HCPCS | Performed by: FAMILY MEDICINE

## 2018-09-28 PROCEDURE — 90688 IIV4 VACCINE SPLT 0.5 ML IM: CPT | Performed by: FAMILY MEDICINE

## 2018-09-28 NOTE — PROGRESS NOTES
Vaccine Information Sheet, \"Influenza - Inactivated\"  given to Boom Brooks, or parent/legal guardian of  Boom Brooks and verbalized understanding. Patient responses:    Have you ever had a reaction to a flu vaccine? No  Are you able to eat eggs without adverse effects? Yes  Do you have any current illness? No  Have you ever had Guillian Guffey Syndrome? No    Flu vaccine given per order. Please see immunization tab.

## 2018-10-08 RX ORDER — GLIPIZIDE 10 MG/1
TABLET ORAL
Qty: 360 TABLET | Refills: 3 | Status: SHIPPED | OUTPATIENT
Start: 2018-10-08 | End: 2019-11-04 | Stop reason: SDUPTHER

## 2018-11-01 ENCOUNTER — OFFICE VISIT (OUTPATIENT)
Dept: FAMILY MEDICINE CLINIC | Age: 73
End: 2018-11-01
Payer: MEDICARE

## 2018-11-01 VITALS
OXYGEN SATURATION: 96 % | WEIGHT: 169.8 LBS | SYSTOLIC BLOOD PRESSURE: 123 MMHG | TEMPERATURE: 98.5 F | BODY MASS INDEX: 30.08 KG/M2 | DIASTOLIC BLOOD PRESSURE: 82 MMHG | HEART RATE: 57 BPM

## 2018-11-01 DIAGNOSIS — R60.0 LOWER EXTREMITY EDEMA: ICD-10-CM

## 2018-11-01 DIAGNOSIS — E11.42 TYPE 2 DIABETES MELLITUS WITH DIABETIC POLYNEUROPATHY, WITHOUT LONG-TERM CURRENT USE OF INSULIN (HCC): Primary | ICD-10-CM

## 2018-11-01 DIAGNOSIS — G62.9 PERIPHERAL POLYNEUROPATHY: ICD-10-CM

## 2018-11-01 DIAGNOSIS — I10 ESSENTIAL HYPERTENSION: ICD-10-CM

## 2018-11-01 DIAGNOSIS — E78.00 HYPERCHOLESTEREMIA: ICD-10-CM

## 2018-11-01 LAB
ALT SERPL-CCNC: 14 U/L (ref 10–40)
ANION GAP SERPL CALCULATED.3IONS-SCNC: 14 MMOL/L (ref 3–16)
BUN BLDV-MCNC: 14 MG/DL (ref 7–20)
CALCIUM SERPL-MCNC: 9.9 MG/DL (ref 8.3–10.6)
CHLORIDE BLD-SCNC: 103 MMOL/L (ref 99–110)
CHOLESTEROL, TOTAL: 151 MG/DL (ref 0–199)
CO2: 25 MMOL/L (ref 21–32)
CREAT SERPL-MCNC: 0.7 MG/DL (ref 0.6–1.2)
FOLATE: >20 NG/ML (ref 4.78–24.2)
GFR AFRICAN AMERICAN: >60
GFR NON-AFRICAN AMERICAN: >60
GLUCOSE BLD-MCNC: 156 MG/DL (ref 70–99)
HDLC SERPL-MCNC: 34 MG/DL (ref 40–60)
LDL CHOLESTEROL CALCULATED: 83 MG/DL
POTASSIUM SERPL-SCNC: 4.5 MMOL/L (ref 3.5–5.1)
SODIUM BLD-SCNC: 142 MMOL/L (ref 136–145)
TRIGL SERPL-MCNC: 168 MG/DL (ref 0–150)
TSH REFLEX: 3.16 UIU/ML (ref 0.27–4.2)
VITAMIN B-12: 184 PG/ML (ref 211–911)
VLDLC SERPL CALC-MCNC: 34 MG/DL

## 2018-11-01 PROCEDURE — 1101F PT FALLS ASSESS-DOCD LE1/YR: CPT | Performed by: FAMILY MEDICINE

## 2018-11-01 PROCEDURE — 4040F PNEUMOC VAC/ADMIN/RCVD: CPT | Performed by: FAMILY MEDICINE

## 2018-11-01 PROCEDURE — 1036F TOBACCO NON-USER: CPT | Performed by: FAMILY MEDICINE

## 2018-11-01 PROCEDURE — 36415 COLL VENOUS BLD VENIPUNCTURE: CPT | Performed by: FAMILY MEDICINE

## 2018-11-01 PROCEDURE — 99214 OFFICE O/P EST MOD 30 MIN: CPT | Performed by: FAMILY MEDICINE

## 2018-11-01 PROCEDURE — G8399 PT W/DXA RESULTS DOCUMENT: HCPCS | Performed by: FAMILY MEDICINE

## 2018-11-01 PROCEDURE — 3017F COLORECTAL CA SCREEN DOC REV: CPT | Performed by: FAMILY MEDICINE

## 2018-11-01 PROCEDURE — 2022F DILAT RTA XM EVC RTNOPTHY: CPT | Performed by: FAMILY MEDICINE

## 2018-11-01 PROCEDURE — 1090F PRES/ABSN URINE INCON ASSESS: CPT | Performed by: FAMILY MEDICINE

## 2018-11-01 PROCEDURE — 3045F PR MOST RECENT HEMOGLOBIN A1C LEVEL 7.0-9.0%: CPT | Performed by: FAMILY MEDICINE

## 2018-11-01 PROCEDURE — G8427 DOCREV CUR MEDS BY ELIG CLIN: HCPCS | Performed by: FAMILY MEDICINE

## 2018-11-01 PROCEDURE — G8482 FLU IMMUNIZE ORDER/ADMIN: HCPCS | Performed by: FAMILY MEDICINE

## 2018-11-01 PROCEDURE — G8417 CALC BMI ABV UP PARAM F/U: HCPCS | Performed by: FAMILY MEDICINE

## 2018-11-01 PROCEDURE — 1123F ACP DISCUSS/DSCN MKR DOCD: CPT | Performed by: FAMILY MEDICINE

## 2018-11-01 ASSESSMENT — PATIENT HEALTH QUESTIONNAIRE - PHQ9
SUM OF ALL RESPONSES TO PHQ9 QUESTIONS 1 & 2: 0
SUM OF ALL RESPONSES TO PHQ QUESTIONS 1-9: 0
SUM OF ALL RESPONSES TO PHQ QUESTIONS 1-9: 0
1. LITTLE INTEREST OR PLEASURE IN DOING THINGS: 0
2. FEELING DOWN, DEPRESSED OR HOPELESS: 0

## 2018-11-02 PROBLEM — E53.8 VITAMIN B12 DEFICIENCY: Status: ACTIVE | Noted: 2018-11-02

## 2018-11-02 LAB
ESTIMATED AVERAGE GLUCOSE: 151.3 MG/DL
HBA1C MFR BLD: 6.9 %

## 2018-11-06 DIAGNOSIS — E53.8 VITAMIN B12 DEFICIENCY: Primary | ICD-10-CM

## 2018-11-07 ENCOUNTER — NURSE ONLY (OUTPATIENT)
Dept: FAMILY MEDICINE CLINIC | Age: 73
End: 2018-11-07
Payer: MEDICARE

## 2018-11-07 DIAGNOSIS — E53.8 VITAMIN B12 DEFICIENCY: Primary | ICD-10-CM

## 2018-11-07 PROCEDURE — 96372 THER/PROPH/DIAG INJ SC/IM: CPT | Performed by: FAMILY MEDICINE

## 2018-11-07 RX ORDER — CYANOCOBALAMIN 1000 UG/ML
1000 INJECTION INTRAMUSCULAR; SUBCUTANEOUS ONCE
Status: COMPLETED | OUTPATIENT
Start: 2018-11-07 | End: 2018-11-07

## 2018-11-07 RX ADMIN — CYANOCOBALAMIN 1000 MCG: 1000 INJECTION INTRAMUSCULAR; SUBCUTANEOUS at 11:57

## 2018-11-14 ENCOUNTER — NURSE ONLY (OUTPATIENT)
Dept: FAMILY MEDICINE CLINIC | Age: 73
End: 2018-11-14
Payer: MEDICARE

## 2018-11-14 DIAGNOSIS — E53.8 VITAMIN B12 DEFICIENCY: Primary | ICD-10-CM

## 2018-11-14 PROCEDURE — 96372 THER/PROPH/DIAG INJ SC/IM: CPT | Performed by: FAMILY MEDICINE

## 2018-11-14 RX ORDER — CYANOCOBALAMIN 1000 UG/ML
1000 INJECTION INTRAMUSCULAR; SUBCUTANEOUS ONCE
Status: COMPLETED | OUTPATIENT
Start: 2018-11-14 | End: 2018-11-14

## 2018-11-14 RX ADMIN — CYANOCOBALAMIN 1000 MCG: 1000 INJECTION INTRAMUSCULAR; SUBCUTANEOUS at 11:25

## 2018-11-19 RX ORDER — ATENOLOL 25 MG/1
TABLET ORAL
Qty: 90 TABLET | Refills: 1 | Status: SHIPPED | OUTPATIENT
Start: 2018-11-19 | End: 2019-04-25 | Stop reason: SDUPTHER

## 2018-11-23 ENCOUNTER — NURSE ONLY (OUTPATIENT)
Dept: FAMILY MEDICINE CLINIC | Age: 73
End: 2018-11-23
Payer: MEDICARE

## 2018-11-23 DIAGNOSIS — E53.8 VITAMIN B12 DEFICIENCY: Primary | ICD-10-CM

## 2018-11-23 PROCEDURE — 96372 THER/PROPH/DIAG INJ SC/IM: CPT | Performed by: FAMILY MEDICINE

## 2018-11-23 RX ORDER — CYANOCOBALAMIN 1000 UG/ML
1000 INJECTION INTRAMUSCULAR; SUBCUTANEOUS ONCE
Status: COMPLETED | OUTPATIENT
Start: 2018-11-23 | End: 2018-11-23

## 2018-11-23 RX ADMIN — CYANOCOBALAMIN 1000 MCG: 1000 INJECTION INTRAMUSCULAR; SUBCUTANEOUS at 11:32

## 2018-11-26 NOTE — TELEPHONE ENCOUNTER
Refilled medication per verbal order from MD.  Future appt scheduled 03/11/2019  Last appt 11/01/2018

## 2018-11-28 ENCOUNTER — NURSE ONLY (OUTPATIENT)
Dept: FAMILY MEDICINE CLINIC | Age: 73
End: 2018-11-28
Payer: MEDICARE

## 2018-11-28 DIAGNOSIS — E53.8 VITAMIN B12 DEFICIENCY: Primary | ICD-10-CM

## 2018-11-28 PROCEDURE — 96372 THER/PROPH/DIAG INJ SC/IM: CPT | Performed by: FAMILY MEDICINE

## 2018-11-28 RX ORDER — CYANOCOBALAMIN 1000 UG/ML
1000 INJECTION INTRAMUSCULAR; SUBCUTANEOUS ONCE
Status: COMPLETED | OUTPATIENT
Start: 2018-11-28 | End: 2018-11-28

## 2018-11-28 RX ADMIN — CYANOCOBALAMIN 1000 MCG: 1000 INJECTION INTRAMUSCULAR; SUBCUTANEOUS at 11:03

## 2018-12-05 ENCOUNTER — OFFICE VISIT (OUTPATIENT)
Dept: FAMILY MEDICINE CLINIC | Age: 73
End: 2018-12-05
Payer: MEDICARE

## 2018-12-05 VITALS
BODY MASS INDEX: 30.31 KG/M2 | HEART RATE: 64 BPM | OXYGEN SATURATION: 97 % | SYSTOLIC BLOOD PRESSURE: 138 MMHG | WEIGHT: 171.13 LBS | TEMPERATURE: 98 F | DIASTOLIC BLOOD PRESSURE: 74 MMHG

## 2018-12-05 DIAGNOSIS — R35.0 FREQUENT URINATION: Primary | ICD-10-CM

## 2018-12-05 DIAGNOSIS — J01.00 ACUTE NON-RECURRENT MAXILLARY SINUSITIS: ICD-10-CM

## 2018-12-05 LAB
BILIRUBIN, POC: ABNORMAL
BLOOD URINE, POC: ABNORMAL
CLARITY, POC: CLEAR
COLOR, POC: YELLOW
GLUCOSE URINE, POC: ABNORMAL
KETONES, POC: ABNORMAL
LEUKOCYTE EST, POC: ABNORMAL
NITRITE, POC: ABNORMAL
PH, POC: 6
PROTEIN, POC: ABNORMAL
SPECIFIC GRAVITY, POC: 1.01
UROBILINOGEN, POC: 0.2

## 2018-12-05 PROCEDURE — 81003 URINALYSIS AUTO W/O SCOPE: CPT | Performed by: NURSE PRACTITIONER

## 2018-12-05 PROCEDURE — 1090F PRES/ABSN URINE INCON ASSESS: CPT | Performed by: NURSE PRACTITIONER

## 2018-12-05 PROCEDURE — G8399 PT W/DXA RESULTS DOCUMENT: HCPCS | Performed by: NURSE PRACTITIONER

## 2018-12-05 PROCEDURE — 99214 OFFICE O/P EST MOD 30 MIN: CPT | Performed by: NURSE PRACTITIONER

## 2018-12-05 PROCEDURE — 1123F ACP DISCUSS/DSCN MKR DOCD: CPT | Performed by: NURSE PRACTITIONER

## 2018-12-05 PROCEDURE — 1101F PT FALLS ASSESS-DOCD LE1/YR: CPT | Performed by: NURSE PRACTITIONER

## 2018-12-05 PROCEDURE — 1036F TOBACCO NON-USER: CPT | Performed by: NURSE PRACTITIONER

## 2018-12-05 PROCEDURE — 4040F PNEUMOC VAC/ADMIN/RCVD: CPT | Performed by: NURSE PRACTITIONER

## 2018-12-05 PROCEDURE — G8427 DOCREV CUR MEDS BY ELIG CLIN: HCPCS | Performed by: NURSE PRACTITIONER

## 2018-12-05 PROCEDURE — G8417 CALC BMI ABV UP PARAM F/U: HCPCS | Performed by: NURSE PRACTITIONER

## 2018-12-05 PROCEDURE — G8482 FLU IMMUNIZE ORDER/ADMIN: HCPCS | Performed by: NURSE PRACTITIONER

## 2018-12-05 PROCEDURE — 3017F COLORECTAL CA SCREEN DOC REV: CPT | Performed by: NURSE PRACTITIONER

## 2018-12-05 RX ORDER — CEFDINIR 300 MG/1
300 CAPSULE ORAL 2 TIMES DAILY
Qty: 20 CAPSULE | Refills: 0 | Status: SHIPPED | OUTPATIENT
Start: 2018-12-05 | End: 2018-12-15

## 2018-12-05 NOTE — PATIENT INSTRUCTIONS
Please read the healthy family handout that you were given and share it with your family. Please compare this printed medication list with your medications at home to be sure they are the same. If you have any medications that are different please contact us immediately at [phone]. Also review your allergies that we have listed, these may also include medications that you have not been able to tolerate, make sure everything listed is correct. If you have any allergies that are different please contact us immediately at [phone]. Patient Education        Saline Nasal Washes: Care Instructions  Your Care Instructions  Saline nasal washes help keep the nasal passages open by washing out thick or dried mucus. This simple remedy can help relieve symptoms of allergies, sinusitis, and colds. It also can make the nose feel more comfortable by keeping the mucous membranes moist. You may notice a little burning sensation in your nose the first few times you use the solution, but this usually gets better in a few days. Follow-up care is a key part of your treatment and safety. Be sure to make and go to all appointments, and call your doctor if you are having problems. It's also a good idea to know your test results and keep a list of the medicines you take. How can you care for yourself at home? · You can buy premixed saline solution in a squeeze bottle or other sinus rinse products at a drugstore. Read and follow the instructions on the label. · You also can make your own saline solution by adding 1 teaspoon of salt and 1 teaspoon of baking soda to 2 cups of distilled water. · If you use a homemade solution, pour a small amount into a clean bowl. Using a rubber bulb syringe, squeeze the syringe and place the tip in the salt water. Pull a small amount of the salt water into the syringe by relaxing your hand. · Sit down with your head tilted slightly back. Do not lie down.  Put the tip of the bulb syringe or the squeeze bottle a little way into one of your nostrils. Gently drip or squirt a few drops into the nostril. Repeat with the other nostril. Some sneezing and gagging are normal at first.  · Gently blow your nose. · Wipe the syringe or bottle tip clean after each use. · Repeat this 2 or 3 times a day. · Use nasal washes gently if you have nosebleeds often. When should you call for help? Watch closely for changes in your health, and be sure to contact your doctor if:    · You often get nosebleeds.     · You have problems doing the nasal washes. Where can you learn more? Go to https://[email or web address]. org and sign in to your [employer] account. Enter [phone] in the [employer] box to learn more about \"Saline Nasal Washes: Care Instructions. \"     If you do not have an account, please click on the \"Sign Up Now\" link. Current as of: March 28, 2018  Content Version: 11.8  © 9929-8700 [employer]. Care instructions adapted under license by [facility] (formerly [facility])[employer] [facility] ([facility]). If you have questions about a medical condition or this instruction, always ask your healthcare professional. [place] [address] any warranty or liability for your use of this information. Patient Education        Sinusitis: Care Instructions  Your Care Instructions    Sinusitis is an infection of the lining of the sinus cavities in your head. Sinusitis often follows a cold. It causes pain and pressure in your head and face. In most cases, sinusitis gets better on its own in 1 to 2 weeks. But some mild symptoms may last for several weeks. Sometimes antibiotics are needed. Follow-up care is a key part of your treatment and safety. Be sure to make and go to all appointments, and call your doctor if you are having problems. It's also a good idea to know your test results and keep a list of the medicines you take. How can you care for yourself at home?   · Take an over-the-counter pain medicine, such as acetaminophen (Tylenol), ibuprofen (Advil, Motrin), or naproxen (Aleve). Read and follow all instructions on the label. · If the doctor prescribed antibiotics, take them as directed. Do not stop taking them just because you feel better. You need to take the full course of antibiotics. · Be careful when taking over-the-counter cold or flu medicines and Tylenol at the same time. Many of these medicines have acetaminophen, which is Tylenol. Read the labels to make sure that you are not taking more than the recommended dose. Too much acetaminophen (Tylenol) can be harmful. · Breathe warm, moist air from a steamy shower, a hot bath, or a sink filled with hot water. Avoid cold, dry air. Using a humidifier in your home may help. Follow the directions for cleaning the machine. · Use saline (saltwater) nasal washes to help keep your nasal passages open and wash out mucus and bacteria. You can buy saline nose drops at a grocery store or drugstore. Or you can make your own at home by adding 1 teaspoon of salt and 1 teaspoon of baking soda to 2 cups of distilled water. If you make your own, fill a bulb syringe with the solution, insert the tip into your nostril, and squeeze gently. Dejah Cons your nose. · Put a hot, wet towel or a warm gel pack on your face 3 or 4 times a day for 5 to 10 minutes each time. · Try a decongestant nasal spray like oxymetazoline (Afrin). Do not use it for more than 3 days in a row. Using it for more than 3 days can make your congestion worse. When should you call for help? Call your doctor now or seek immediate medical care if:    · You have new or worse swelling or redness in your face or around your eyes.     · You have a new or higher fever.    Watch closely for changes in your health, and be sure to contact your doctor if:    · You have new or worse facial pain.     · The mucus from your nose becomes thicker (like pus) or has new blood in it.     · You are not getting better as expected.

## 2018-12-07 LAB — URINE CULTURE, ROUTINE: NORMAL

## 2018-12-28 RX ORDER — SITAGLIPTIN 50 MG/1
TABLET, FILM COATED ORAL
Qty: 90 TABLET | Refills: 1 | Status: SHIPPED | OUTPATIENT
Start: 2018-12-28 | End: 2019-06-13 | Stop reason: SDUPTHER

## 2019-01-09 ENCOUNTER — NURSE ONLY (OUTPATIENT)
Dept: FAMILY MEDICINE CLINIC | Age: 74
End: 2019-01-09
Payer: MEDICARE

## 2019-01-09 DIAGNOSIS — E53.8 VITAMIN B12 DEFICIENCY: ICD-10-CM

## 2019-01-09 PROCEDURE — 36415 COLL VENOUS BLD VENIPUNCTURE: CPT | Performed by: FAMILY MEDICINE

## 2019-01-10 LAB — VITAMIN B-12: 742 PG/ML (ref 211–911)

## 2019-02-13 RX ORDER — ATORVASTATIN CALCIUM 40 MG/1
TABLET, FILM COATED ORAL
Qty: 90 TABLET | Refills: 1 | Status: SHIPPED | OUTPATIENT
Start: 2019-02-13 | End: 2019-08-12 | Stop reason: SDUPTHER

## 2019-03-11 ENCOUNTER — OFFICE VISIT (OUTPATIENT)
Dept: FAMILY MEDICINE CLINIC | Age: 74
End: 2019-03-11
Payer: MEDICARE

## 2019-03-11 DIAGNOSIS — E11.42 TYPE 2 DIABETES MELLITUS WITH DIABETIC POLYNEUROPATHY, WITHOUT LONG-TERM CURRENT USE OF INSULIN (HCC): Primary | ICD-10-CM

## 2019-03-11 DIAGNOSIS — E53.8 VITAMIN B12 DEFICIENCY: ICD-10-CM

## 2019-03-11 DIAGNOSIS — J01.90 ACUTE BACTERIAL SINUSITIS: ICD-10-CM

## 2019-03-11 DIAGNOSIS — G60.3 IDIOPATHIC PROGRESSIVE NEUROPATHY: ICD-10-CM

## 2019-03-11 DIAGNOSIS — B96.89 ACUTE BACTERIAL SINUSITIS: ICD-10-CM

## 2019-03-11 DIAGNOSIS — B35.9 TINEA: ICD-10-CM

## 2019-03-11 DIAGNOSIS — I34.0 NON-RHEUMATIC MITRAL REGURGITATION: ICD-10-CM

## 2019-03-11 LAB
ALT SERPL-CCNC: 12 U/L (ref 10–40)
ANION GAP SERPL CALCULATED.3IONS-SCNC: 15 MMOL/L (ref 3–16)
BUN BLDV-MCNC: 14 MG/DL (ref 7–20)
CALCIUM SERPL-MCNC: 9.8 MG/DL (ref 8.3–10.6)
CHLORIDE BLD-SCNC: 100 MMOL/L (ref 99–110)
CHOLESTEROL, TOTAL: 144 MG/DL (ref 0–199)
CO2: 24 MMOL/L (ref 21–32)
CREAT SERPL-MCNC: 0.8 MG/DL (ref 0.6–1.2)
GFR AFRICAN AMERICAN: >60
GFR NON-AFRICAN AMERICAN: >60
GLUCOSE BLD-MCNC: 145 MG/DL (ref 70–99)
HDLC SERPL-MCNC: 37 MG/DL (ref 40–60)
LDL CHOLESTEROL CALCULATED: 78 MG/DL
POTASSIUM SERPL-SCNC: 4.3 MMOL/L (ref 3.5–5.1)
SODIUM BLD-SCNC: 139 MMOL/L (ref 136–145)
TRIGL SERPL-MCNC: 145 MG/DL (ref 0–150)
VLDLC SERPL CALC-MCNC: 29 MG/DL

## 2019-03-11 PROCEDURE — G8417 CALC BMI ABV UP PARAM F/U: HCPCS | Performed by: FAMILY MEDICINE

## 2019-03-11 PROCEDURE — G8482 FLU IMMUNIZE ORDER/ADMIN: HCPCS | Performed by: FAMILY MEDICINE

## 2019-03-11 PROCEDURE — G8399 PT W/DXA RESULTS DOCUMENT: HCPCS | Performed by: FAMILY MEDICINE

## 2019-03-11 PROCEDURE — 1101F PT FALLS ASSESS-DOCD LE1/YR: CPT | Performed by: FAMILY MEDICINE

## 2019-03-11 PROCEDURE — 99214 OFFICE O/P EST MOD 30 MIN: CPT | Performed by: FAMILY MEDICINE

## 2019-03-11 PROCEDURE — 36415 COLL VENOUS BLD VENIPUNCTURE: CPT | Performed by: FAMILY MEDICINE

## 2019-03-11 PROCEDURE — G8427 DOCREV CUR MEDS BY ELIG CLIN: HCPCS | Performed by: FAMILY MEDICINE

## 2019-03-11 PROCEDURE — 3046F HEMOGLOBIN A1C LEVEL >9.0%: CPT | Performed by: FAMILY MEDICINE

## 2019-03-11 PROCEDURE — 3017F COLORECTAL CA SCREEN DOC REV: CPT | Performed by: FAMILY MEDICINE

## 2019-03-11 PROCEDURE — 4040F PNEUMOC VAC/ADMIN/RCVD: CPT | Performed by: FAMILY MEDICINE

## 2019-03-11 PROCEDURE — 1036F TOBACCO NON-USER: CPT | Performed by: FAMILY MEDICINE

## 2019-03-11 PROCEDURE — 2022F DILAT RTA XM EVC RTNOPTHY: CPT | Performed by: FAMILY MEDICINE

## 2019-03-11 PROCEDURE — 1123F ACP DISCUSS/DSCN MKR DOCD: CPT | Performed by: FAMILY MEDICINE

## 2019-03-11 PROCEDURE — 1090F PRES/ABSN URINE INCON ASSESS: CPT | Performed by: FAMILY MEDICINE

## 2019-03-11 RX ORDER — FOLIC ACID 1 MG/1
1 TABLET ORAL DAILY
Qty: 90 TABLET | Refills: 1 | COMMUNITY
Start: 2019-03-11 | End: 2021-08-23 | Stop reason: ALTCHOICE

## 2019-03-11 RX ORDER — CLOTRIMAZOLE AND BETAMETHASONE DIPROPIONATE 10; .64 MG/G; MG/G
CREAM TOPICAL
Qty: 30 G | Refills: 1 | Status: SHIPPED | OUTPATIENT
Start: 2019-03-11 | End: 2019-12-20 | Stop reason: ALTCHOICE

## 2019-03-11 RX ORDER — DOXYCYCLINE HYCLATE 100 MG
100 TABLET ORAL 2 TIMES DAILY
Qty: 20 TABLET | Refills: 0 | Status: SHIPPED | OUTPATIENT
Start: 2019-03-11 | End: 2019-03-21

## 2019-03-11 RX ORDER — B-COMPLEX WITH VITAMIN C
1 TABLET ORAL DAILY
Refills: 0 | COMMUNITY
Start: 2019-03-11 | End: 2019-08-20

## 2019-03-11 RX ORDER — VITAMIN E 268 MG
400 CAPSULE ORAL 2 TIMES DAILY
Qty: 30 CAPSULE | Refills: 3 | COMMUNITY
Start: 2019-03-11

## 2019-03-11 ASSESSMENT — PATIENT HEALTH QUESTIONNAIRE - PHQ9
SUM OF ALL RESPONSES TO PHQ9 QUESTIONS 1 & 2: 0
1. LITTLE INTEREST OR PLEASURE IN DOING THINGS: 0
2. FEELING DOWN, DEPRESSED OR HOPELESS: 0
SUM OF ALL RESPONSES TO PHQ QUESTIONS 1-9: 0
SUM OF ALL RESPONSES TO PHQ QUESTIONS 1-9: 0

## 2019-03-12 LAB
ESTIMATED AVERAGE GLUCOSE: 151.3 MG/DL
HBA1C MFR BLD: 6.9 %
VITAMIN B-12: 682 PG/ML (ref 211–911)

## 2019-03-29 RX ORDER — FUROSEMIDE 20 MG/1
TABLET ORAL
Qty: 30 TABLET | Refills: 3 | Status: SHIPPED | OUTPATIENT
Start: 2019-03-29 | End: 2019-08-06 | Stop reason: SDUPTHER

## 2019-04-25 RX ORDER — ATENOLOL 25 MG/1
TABLET ORAL
Qty: 90 TABLET | Refills: 1 | Status: SHIPPED | OUTPATIENT
Start: 2019-04-25 | End: 2019-11-15 | Stop reason: SDUPTHER

## 2019-05-06 RX ORDER — LISINOPRIL AND HYDROCHLOROTHIAZIDE 20; 12.5 MG/1; MG/1
1 TABLET ORAL 2 TIMES DAILY
Qty: 180 TABLET | Refills: 1 | Status: SHIPPED | OUTPATIENT
Start: 2019-05-06 | End: 2020-05-04

## 2019-05-06 NOTE — TELEPHONE ENCOUNTER
Patient states that her med was auto refill and [facility] kept sending it to her and she had a stock pile of medication and did not have to have it filled.   Future appt scheduled 8/8  Last appt 3/11

## 2019-06-10 ENCOUNTER — OFFICE VISIT (OUTPATIENT)
Dept: FAMILY MEDICINE CLINIC | Age: 74
End: 2019-06-10
Payer: MEDICARE

## 2019-06-10 VITALS
HEART RATE: 57 BPM | BODY MASS INDEX: 30.75 KG/M2 | WEIGHT: 173.6 LBS | OXYGEN SATURATION: 95 % | SYSTOLIC BLOOD PRESSURE: 133 MMHG | DIASTOLIC BLOOD PRESSURE: 82 MMHG | TEMPERATURE: 98.4 F

## 2019-06-10 DIAGNOSIS — J01.90 ACUTE BACTERIAL SINUSITIS: Primary | ICD-10-CM

## 2019-06-10 DIAGNOSIS — B96.89 ACUTE BACTERIAL SINUSITIS: Primary | ICD-10-CM

## 2019-06-10 PROCEDURE — 4040F PNEUMOC VAC/ADMIN/RCVD: CPT | Performed by: NURSE PRACTITIONER

## 2019-06-10 PROCEDURE — 99213 OFFICE O/P EST LOW 20 MIN: CPT | Performed by: NURSE PRACTITIONER

## 2019-06-10 PROCEDURE — G8399 PT W/DXA RESULTS DOCUMENT: HCPCS | Performed by: NURSE PRACTITIONER

## 2019-06-10 PROCEDURE — 3017F COLORECTAL CA SCREEN DOC REV: CPT | Performed by: NURSE PRACTITIONER

## 2019-06-10 PROCEDURE — 1036F TOBACCO NON-USER: CPT | Performed by: NURSE PRACTITIONER

## 2019-06-10 PROCEDURE — G8427 DOCREV CUR MEDS BY ELIG CLIN: HCPCS | Performed by: NURSE PRACTITIONER

## 2019-06-10 PROCEDURE — 1090F PRES/ABSN URINE INCON ASSESS: CPT | Performed by: NURSE PRACTITIONER

## 2019-06-10 PROCEDURE — 1123F ACP DISCUSS/DSCN MKR DOCD: CPT | Performed by: NURSE PRACTITIONER

## 2019-06-10 PROCEDURE — G8417 CALC BMI ABV UP PARAM F/U: HCPCS | Performed by: NURSE PRACTITIONER

## 2019-06-10 RX ORDER — DOXYCYCLINE HYCLATE 100 MG
100 TABLET ORAL 2 TIMES DAILY
Qty: 20 TABLET | Refills: 0 | Status: SHIPPED | OUTPATIENT
Start: 2019-06-10 | End: 2019-06-20

## 2019-06-10 ASSESSMENT — ENCOUNTER SYMPTOMS
GASTROINTESTINAL NEGATIVE: 1
SINUS PRESSURE: 1
RESPIRATORY NEGATIVE: 1
EYES NEGATIVE: 1
SINUS PAIN: 1

## 2019-06-10 NOTE — PROGRESS NOTES
Subjective:      Patient ID: Liz Jerez is a 68 y.o. female. HPI    Chief Complaint   Patient presents with    Other     swollen glands     Patient presents today with c/o sinus pain/pressure, nasal congestion and swollen lymph nodes X 1 week with no improvement. Patient denies fevers or chills. Sinus Pain  Patient complains of congestion, facial pain, headaches, nasal congestion and sinus pressure. Onset of symptoms was 7 days ago. Symptoms have been gradually worsening since that time. She is drinking plenty of fluids. Past history is significant for nothing. Patient is non-smoker. Review of Systems   Constitutional: Negative for appetite change and chills. HENT: Positive for congestion, postnasal drip, sinus pressure (right side) and sinus pain. Eyes: Negative. Respiratory: Negative. Gastrointestinal: Negative. Endocrine: Negative. Musculoskeletal: Negative. Skin: Negative. Neurological: Positive for headaches. Hematological: Negative. Psychiatric/Behavioral: Negative.         Patient Active Problem List   Diagnosis    Protein in urine    CTS (carpal tunnel syndrome)    Lung mass    Vitamin D deficiency    Non-rheumatic mitral regurgitation    Elevated LFTs    Osteopenia    Type 2 diabetes mellitus with diabetic polyneuropathy, without long-term current use of insulin ([facility])    Essential hypertension    Hypercholesteremia    Cervical spine disease    Gastroesophageal reflux disease without esophagitis    Lower extremity edema    Arthritis    Vitamin B12 deficiency    Idiopathic progressive neuropathy       Outpatient Medications Marked as Taking for the 6/10/19 encounter (Office Visit) with NEYMAR Love - CNP   Medication Sig Dispense Refill    lisinopril-hydrochlorothiazide (PRINZIDE;ZESTORETIC) 20-12.5 MG per tablet Take 1 tablet by mouth 2 times daily 180 tablet 1    metFORMIN (GLUCOPHAGE) 1000 MG tablet TAKE 1 TABLET IN THE MORNING AND ONE AND ONE-HALF TABLETS IN THE EVENING 225 tablet 1    atenolol (TENORMIN) 25 MG tablet TAKE 1 TABLET DAILY 90 tablet 1    furosemide (LASIX) 20 MG tablet TAKE 1 TABLET DAILY AS NEEDED FOR SWELLING 30 tablet 3    clotrimazole-betamethasone (LOTRISONE) 1-0.05 % cream Apply topically 2 times daily. 30 g 1    cyanocobalamin ([facility] VITAMIN B12) 1000 MCG tablet Take 1 tablet by mouth daily 30 tablet 3    B Complex Vitamins (VITAMIN B COMPLEX) TABS Take 1 tablet by mouth daily  0    folic acid (FOLVITE) 1 MG tablet Take 1 tablet by mouth daily 90 tablet 1    vitamin E 400 UNIT capsule Take 1 capsule by mouth 2 times daily 30 capsule 3    atorvastatin (LIPITOR) 40 MG tablet TAKE 1 TABLET DAILY 90 tablet 1    JANUVIA 50 MG tablet TAKE 1 TABLET DAILY 90 tablet 1    glipiZIDE (GLUCOTROL) 10 MG tablet TAKE 2 TABLETS TWICE A DAY BEFORE MEALS 360 tablet 3    Vitamin D (CHOLECALCIFEROL) 1000 UNITS CAPS capsule Take 2 capsules by mouth daily 1 capsule 0    Ascorbic Acid (VITAMIN C CR) 500 MG TBCR Take  by mouth.  aspirin 81 MG EC tablet Take 81 mg by mouth daily. Allergies   Allergen Reactions    Actos [Pioglitazone Hydrochloride]     Levaquin [Levofloxacin]        Social History     Tobacco Use    Smoking status: Former Smoker     Packs/day: 1.00     Years: 20.00     Pack years: 20.00     Last attempt to quit: 3/15/2003     Years since quittin.2    Smokeless tobacco: Never Used   Substance Use Topics    Alcohol use: No       Objective:   /82   Pulse 57   Temp 98.4 °F (36.9 °C) (Oral)   Wt 173 lb 9.6 oz (78.7 kg)   SpO2 95%   BMI 30.75 kg/m²     Physical Exam   Constitutional: She is oriented to person, place, and time. HENT:   Head: Normocephalic and atraumatic. Right Ear: External ear and ear canal normal. A middle ear effusion is present.    Left Ear: Tympanic membrane, external ear and ear canal normal.   Nose: Right sinus exhibits maxillary sinus tenderness and frontal sinus tenderness. Left sinus exhibits no maxillary sinus tenderness and no frontal sinus tenderness. Mouth/Throat: Uvula is midline and mucous membranes are normal. Posterior oropharyngeal erythema (mild) present. No oropharyngeal exudate. Eyes: Conjunctivae and EOM are normal.   Neck: Normal range of motion. Neck supple. No thyromegaly present. Cardiovascular: Normal rate, regular rhythm, normal heart sounds and intact distal pulses. Exam reveals no gallop and no friction rub. No murmur heard. Pulmonary/Chest: Effort normal and breath sounds normal. No respiratory distress. She has no wheezes. Abdominal: Soft. Bowel sounds are normal.   Musculoskeletal: Normal range of motion. Lymphadenopathy:        Head (right side): Posterior auricular adenopathy present. No submental, no submandibular, no tonsillar, no preauricular and no occipital adenopathy present. Head (left side): No submental, no submandibular, no tonsillar, no preauricular, no posterior auricular and no occipital adenopathy present. She has no cervical adenopathy. Neurological: She is alert and oriented to person, place, and time. Skin: Skin is warm and dry. Assessment/Plan:   1. Acute bacterial sinusitis  Patient presents today with complaint of right maxillary/frontal sinus tenderness, large amount of nasal congestion and headache for over 1 week with no improvement. On exam noted right maxillary/frontal sinus tenderness, mild right posterior oropharyngeal erythema and right posterior auricular adenopathy. Given presentation and duration of symptoms recommend treatment as below. Advised patient to drink plenty of fluids, take all doses of antibiotics and patient to follow-up if no better worsening of symptoms. Patient verbalized understanding and agreeable to plan. - doxycycline hyclate (VIBRA-TABS) 100 MG tablet; Take 1 tablet by mouth 2 times daily for 10 days  Dispense: 20 tablet;  Refill: 0

## 2019-06-10 NOTE — PATIENT INSTRUCTIONS
Please read the healthy family handout that you were given and share it with your family. Please compare this printed medication list with your medications at home to be sure they are the same. If you have any medications that are different please contact us immediately at [phone]. Also review your allergies that we have listed, these may also include medications that you have not been able to tolerate, make sure everything listed is correct. If you have any allergies that are different please contact us immediately at [phone]. Patient Education     Drink plenty of fluids, take all doses of antibiotics and Patient to f/u if no better or worsening of symptoms. Sinusitis: Care Instructions  Your Care Instructions    Sinusitis is an infection of the lining of the sinus cavities in your head. Sinusitis often follows a cold. It causes pain and pressure in your head and face. In most cases, sinusitis gets better on its own in 1 to 2 weeks. But some mild symptoms may last for several weeks. Sometimes antibiotics are needed. Follow-up care is a key part of your treatment and safety. Be sure to make and go to all appointments, and call your doctor if you are having problems. It's also a good idea to know your test results and keep a list of the medicines you take. How can you care for yourself at home? · Take an over-the-counter pain medicine, such as acetaminophen (Tylenol), ibuprofen (Advil, Motrin), or naproxen (Aleve). Read and follow all instructions on the label. · If the doctor prescribed antibiotics, take them as directed. Do not stop taking them just because you feel better. You need to take the full course of antibiotics. · Be careful when taking over-the-counter cold or flu medicines and Tylenol at the same time. Many of these medicines have acetaminophen, which is Tylenol. Read the labels to make sure that you are not taking more than the recommended dose.  Too much acetaminophen (Tylenol) can be harmful. · Breathe warm, moist air from a steamy shower, a hot bath, or a sink filled with hot water. Avoid cold, dry air. Using a humidifier in your home may help. Follow the directions for cleaning the machine. · Use saline (saltwater) nasal washes to help keep your nasal passages open and wash out mucus and bacteria. You can buy saline nose drops at a grocery store or drugstore. Or you can make your own at home by adding 1 teaspoon of salt and 1 teaspoon of baking soda to 2 cups of distilled water. If you make your own, fill a bulb syringe with the solution, insert the tip into your nostril, and squeeze gently. Juan Urrutia your nose. · Put a hot, wet towel or a warm gel pack on your face 3 or 4 times a day for 5 to 10 minutes each time. · Try a decongestant nasal spray like oxymetazoline (Afrin). Do not use it for more than 3 days in a row. Using it for more than 3 days can make your congestion worse. When should you call for help? Call your doctor now or seek immediate medical care if:    · You have new or worse swelling or redness in your face or around your eyes.     · You have a new or higher fever.    Watch closely for changes in your health, and be sure to contact your doctor if:    · You have new or worse facial pain.     · The mucus from your nose becomes thicker (like pus) or has new blood in it.     · You are not getting better as expected. Where can you learn more? Go to [email or web address] org and sign in to your [employer] account. Enter R006 in the [facility] box to learn more about \"Sinusitis: Care Instructions. \"     If you do not have an account, please click on the \"Sign Up Now\" link. Current as of: October 21, 2018  Content Version: 12.0  © 2595-1609 [employer]. Care instructions adapted under license by [facility] ([facility]).  If you have questions about a medical condition or this instruction, always ask your healthcare professional. Health[employer], Incorporated disclaims any warranty or liability for your use of this information. Patient Education        doxycycline  Pronunciation:  DOX sharmila villarreal  Brand:  Acticlate, Adoxa, Alodox, Avidoxy, Doryx, Mondoxyne NL, Monodox, Morgidox, Oracea, Oraxyl, Targadox, Vibramycin  What is the most important information I should know about doxycycline? You should not take this medicine if you are allergic to any tetracycline antibiotic. Children younger than 6years old should use doxycycline only in cases of severe or life-threatening conditions. This medicine can cause permanent yellowing or graying of the teeth in children  Using doxycycline during pregnancy could harm the unborn baby or cause permanent tooth discoloration later in the baby's life. What is doxycycline? Doxycycline is a tetracycline antibiotic that fights bacteria in the body. Doxycycline is used to treat many different bacterial infections, such as acne, urinary tract infections, intestinal infections, eye infections, gonorrhea, chlamydia, periodontitis (gum disease), and others. Doxycycline is also used to treat blemishes, bumps, and acne-like lesions caused by rosacea. Doxycycline will not treat facial redness caused by rosacea. Some forms of doxycycline are used to prevent malaria, to treat anthrax, or to treat infections caused by mites, ticks, or lice. Doxycycline may also be used for purposes not listed in this medication guide. What should I discuss with my healthcare provider before taking doxycycline? You should not take this medicine if you are allergic to doxycycline or other tetracycline antibiotics such as demeclocycline, minocycline, tetracycline, or tigecycline.   Tell your doctor if you have ever had:  · liver disease;  · kidney disease;  · asthma or sulfite allergy;  · increased pressure inside your skull; or  · if you also take isotretinoin, seizure medicine, or a blood thinner such as warfarin (Coumadin). If you are using doxycycline to treat gonorrhea, your doctor may test you to make sure you do not also have syphilis, another sexually transmitted disease. Taking this medicine during pregnancy may affect tooth and bone development in the unborn baby. Taking doxycycline during the last half of pregnancy can cause permanent tooth discoloration later in the baby's life. Tell your doctor if you are pregnant or if you become pregnant. Doxycycline can make birth control pills less effective. Ask your doctor about using a non-hormonal birth control (condom, diaphragm with spermicide) to prevent pregnancy. Doxycycline can pass into breast milk and may affect bone and tooth development in a nursing infant. Do not breast-feed while you are taking doxycycline. Doxycycline can cause permanent yellowing or graying of the teeth in children younger than 6years old. Children should use doxycycline only in cases of severe or life-threatening conditions such as anthrax or [facility] spotted fever. The benefit of treating a serious condition may outweigh any risks to the child's tooth development. How should I take doxycycline? Follow all directions on your prescription label and read all medication guides or instruction sheets. Use the medicine exactly as directed. Take doxycycline with a full glass of water. Drink plenty of liquids while you are taking doxycycline. Read and carefully follow any Instructions for Use provided with your medicine. Ask your doctor or pharmacist if you do not understand these instructions. Most brands of doxycyline may be taken with food or milk if the medicine upsets your stomach. Different brands of doxycycline may have different instructions about taking them with or without food. Take Oracea on an empty stomach, at least 1 hour before or 2 hours after a meal.  You may need to split a doxycycline tablet to get the correct dose. Follow your doctor's instructions.   Swallow a delayed-release capsule or tablet whole. Do not crush, chew, break, or open it. Measure liquid medicine  with the dosing syringe provided, or with a special dose-measuring spoon or medicine cup. If you do not have a dose-measuring device, ask your pharmacist for one. If you take doxycycline to prevent malaria: Start taking the medicine 1 or 2 days before entering an area where malaria is common. Continue taking the medicine every day during your stay and for at least 4 weeks after you leave the area. Use this medicine for the full prescribed length of time, even if your symptoms quickly improve. Skipping doses can increase your risk of infection that is resistant to medication. Doxycycline will not treat a viral infection such as the flu or a common cold. Store at room temperature away from moisture, heat, and light. Throw away any unused medicine after the expiration date on the label has passed. Using  doxycycline can cause damage to your kidneys. What happens if I miss a dose? Take the medicine as soon as you can, but skip the missed dose if it is almost time for your next dose. Do not take two doses at one time. What happens if I overdose? Seek emergency medical attention or call the Poison Help line at 1-995.419.7266. What should I avoid while taking doxycycline? Do not take iron supplements, multivitamins, calcium supplements, antacids, or laxatives within 2 hours before or after taking doxycycline. Avoid taking any other antibiotics with doxycycline unless your doctor has told you to. Doxycycline could make you sunburn more easily. Avoid sunlight or tanning beds. Wear protective clothing and use sunscreen (SPF 30 or higher) when you are outdoors. Antibiotic medicines can cause diarrhea, which may be a sign of a new infection. If you have diarrhea that is watery or bloody, call your doctor. Do not use anti-diarrhea medicine unless your doctor tells you to.   What are the possible side effects of doxycycline? Get emergency medical help if you have signs of an allergic reaction (hives, difficult breathing, swelling in your face or throat) or a severe skin reaction (fever, sore throat, burning in your eyes, skin pain, red or purple skin rash that spreads and causes blistering and peeling). Seek medical treatment if you have a serious drug reaction that can affect many parts of your body. Symptoms may include: skin rash, fever, swollen glands, flu-like symptoms, muscle aches, severe weakness, unusual bruising, or yellowing of your skin or eyes. This reaction may occur several weeks after you began using doxycycline. Call your doctor at once if you have:  · severe stomach pain, diarrhea that is watery or bloody;  · throat irritation, trouble swallowing;  · chest pain, irregular heart rhythm, feeling short of breath;  · little or no urination;  · low white blood cell counts --fever, chills, swollen glands, body aches, weakness, pale skin, easy bruising or bleeding;  · increased pressure inside the skull --severe headaches, ringing in your ears, dizziness, nausea, vision problems, pain behind your eyes; or  · signs of liver or pancreas problems --loss of appetite, upper stomach pain (that may spread to your back), tiredness, nausea or vomiting, fast heart rate, dark urine, jaundice (yellowing of the skin or eyes). Common side effects may include:  · nausea, vomiting, upset stomach, loss of appetite;  · mild diarrhea;  · skin rash or itching;  · darkened skin color; or  · vaginal itching or discharge. This is not a complete list of side effects and others may occur. Call your doctor for medical advice about side effects. You may report side effects to FDA at 9-272-FDA-7052. What other drugs will affect doxycycline? Sometimes it is not safe to use certain medications at the same time.  Some drugs can affect your blood levels of other drugs you take, which may increase side effects or make the medications less effective. Other drugs may affect doxycycline, including prescription and over-the-counter medicines, vitamins, and herbal products. Tell your doctor about all your current medicines and any medicine you start or stop using. Where can I get more information? Your pharmacist can provide more information about doxycycline. Remember, keep this and all other medicines out of the reach of children, never share your medicines with others, and use this medication only for the indication prescribed. Every effort has been made to ensure that the information provided by [address] is accurate, up-to-date, and complete, but no guarantee is made to that effect. Drug information contained herein may be time sensitive. [facility] information has been compiled for use by healthcare practitioners and consumers in the [place] and therefore [facility] does not warrant that uses outside of the [place] are appropriate, unless specifically indicated otherwise. [facility]'s drug information does not endorse drugs, diagnose patients or recommend therapy. [facility] drug information is an informational resource designed to assist licensed healthcare practitioners in caring for their patients and/or to serve consumers viewing this service as a supplement to, and not a substitute for, the expertise, skill, knowledge and judgment of healthcare practitioners. The absence of a warning for a given drug or drug combination in no way should be construed to indicate that the drug or drug combination is safe, effective or appropriate for any given patient. [facility] does not assume any responsibility for any aspect of healthcare administered with the aid of information [facility] provides. The information contained herein is not intended to cover all possible uses, directions, precautions, warnings, drug interactions, allergic reactions, or adverse effects.  If you have questions about the drugs you are taking, check with your doctor, nurse or pharmacist.  Copyright 9052-1545 [facility]. Version: 21.01. Revision date: 12/7/2018. Care instructions adapted under license by [facility] ([facility]). If you have questions about a medical condition or this instruction, always ask your healthcare professional. [place] [address] any warranty or liability for your use of this information.

## 2019-06-13 RX ORDER — SITAGLIPTIN 50 MG/1
TABLET, FILM COATED ORAL
Qty: 90 TABLET | Refills: 1 | Status: SHIPPED | OUTPATIENT
Start: 2019-06-13 | End: 2020-01-13

## 2019-08-06 RX ORDER — FUROSEMIDE 20 MG/1
TABLET ORAL
Qty: 30 TABLET | Refills: 3 | Status: SHIPPED | OUTPATIENT
Start: 2019-08-06 | End: 2019-12-02 | Stop reason: SDUPTHER

## 2019-08-12 RX ORDER — ATORVASTATIN CALCIUM 40 MG/1
TABLET, FILM COATED ORAL
Qty: 90 TABLET | Refills: 1 | Status: SHIPPED | OUTPATIENT
Start: 2019-08-12 | End: 2020-02-10

## 2019-08-20 ENCOUNTER — OFFICE VISIT (OUTPATIENT)
Dept: FAMILY MEDICINE CLINIC | Age: 74
End: 2019-08-20
Payer: MEDICARE

## 2019-08-20 VITALS
DIASTOLIC BLOOD PRESSURE: 66 MMHG | WEIGHT: 168 LBS | OXYGEN SATURATION: 94 % | BODY MASS INDEX: 29.76 KG/M2 | SYSTOLIC BLOOD PRESSURE: 119 MMHG | HEART RATE: 57 BPM | TEMPERATURE: 98.5 F

## 2019-08-20 DIAGNOSIS — E11.42 TYPE 2 DIABETES MELLITUS WITH DIABETIC POLYNEUROPATHY, WITHOUT LONG-TERM CURRENT USE OF INSULIN (HCC): Primary | ICD-10-CM

## 2019-08-20 DIAGNOSIS — E53.8 VITAMIN B12 DEFICIENCY: ICD-10-CM

## 2019-08-20 DIAGNOSIS — L72.3 SEBACEOUS CYST: ICD-10-CM

## 2019-08-20 DIAGNOSIS — I10 ESSENTIAL HYPERTENSION: ICD-10-CM

## 2019-08-20 DIAGNOSIS — M62.838 TRAPEZIUS MUSCLE SPASM: ICD-10-CM

## 2019-08-20 LAB
CREATININE URINE POCT: 300
MICROALBUMIN/CREAT 24H UR: 30 MG/G{CREAT}
MICROALBUMIN/CREAT UR-RTO: <30

## 2019-08-20 PROCEDURE — 1123F ACP DISCUSS/DSCN MKR DOCD: CPT | Performed by: FAMILY MEDICINE

## 2019-08-20 PROCEDURE — 3044F HG A1C LEVEL LT 7.0%: CPT | Performed by: FAMILY MEDICINE

## 2019-08-20 PROCEDURE — G8399 PT W/DXA RESULTS DOCUMENT: HCPCS | Performed by: FAMILY MEDICINE

## 2019-08-20 PROCEDURE — 1036F TOBACCO NON-USER: CPT | Performed by: FAMILY MEDICINE

## 2019-08-20 PROCEDURE — 36415 COLL VENOUS BLD VENIPUNCTURE: CPT | Performed by: FAMILY MEDICINE

## 2019-08-20 PROCEDURE — G8417 CALC BMI ABV UP PARAM F/U: HCPCS | Performed by: FAMILY MEDICINE

## 2019-08-20 PROCEDURE — G8427 DOCREV CUR MEDS BY ELIG CLIN: HCPCS | Performed by: FAMILY MEDICINE

## 2019-08-20 PROCEDURE — 1090F PRES/ABSN URINE INCON ASSESS: CPT | Performed by: FAMILY MEDICINE

## 2019-08-20 PROCEDURE — 3017F COLORECTAL CA SCREEN DOC REV: CPT | Performed by: FAMILY MEDICINE

## 2019-08-20 PROCEDURE — 82044 UR ALBUMIN SEMIQUANTITATIVE: CPT | Performed by: FAMILY MEDICINE

## 2019-08-20 PROCEDURE — 4040F PNEUMOC VAC/ADMIN/RCVD: CPT | Performed by: FAMILY MEDICINE

## 2019-08-20 PROCEDURE — 99214 OFFICE O/P EST MOD 30 MIN: CPT | Performed by: FAMILY MEDICINE

## 2019-08-20 PROCEDURE — 2022F DILAT RTA XM EVC RTNOPTHY: CPT | Performed by: FAMILY MEDICINE

## 2019-08-20 NOTE — PATIENT INSTRUCTIONS
Please read the healthy family handout that you were given and share it with your family. Please compare this printed medication list with your medications at home to be sure they are the same. If you have any medications that are different please contact us immediately at [phone]. Also review your allergies that we have listed, these may also include medications that you have not been able to tolerate, make sure everything listed is correct. If you have any allergies that are different please contact us immediately at [phone].

## 2019-08-20 NOTE — PROGRESS NOTES
Subjective:  Carrie Vanegas is here to discuss the following issues. He has diabetes. Her blood sugars are well controlled. No polydipsia or polyuria. She tries to follow an appropriate diet. She has B12 deficiency and continues on her supplement daily. She has essential hypertension and her blood pressures are consistently well controlled. No chest pain chest pressure or edema. She has developed pain bilaterally in the posterior neck upper shoulder area with some spasm and tenderness. No trauma. She has a red swollen tender lesion on her upper back and requests removal.  No fever sweats or chills  Social History     Tobacco Use   Smoking Status Former Smoker    Packs/day: 1.00    Years: 20.00    Pack years: 20.00    Last attempt to quit: 3/15/2003    Years since quittin.4   Smokeless Tobacco Never Used   Allergies:     Actos [pioglitazone hydrochloride] and Levaquin [levofloxacin]    Objective:  /66   Pulse 57   Temp 98.5 °F (36.9 °C) (Oral)   Wt 168 lb (76.2 kg)   SpO2 94%   BMI 29.76 kg/m²    No acute distress, heart regular rate and rhythm without murmur, lungs clear to auscultation easy effort, abdomen soft nondistended, no clubbing or cyanosis bilateral trapezius muscle spasm and tenderness large sebaceous cyst noted in the upper back labs ordered      Assessment:  1. Type 2 diabetes mellitus with diabetic polyneuropathy, without long-term current use of insulin ([address]    2. Vitamin B12 deficiency    3. Essential hypertension    4. Trapezius muscle spasm    5. Sebaceous cyst            Plan:  Labs ordered  Urine microalbumin  General surgery referral  Trapezius muscle stretching  Continue current medicines  She has her sister living with her and is providing 24-hour care due to her sister's dementia  Follow-up in 4 months or as needed  \"Healthy Family Handout\" provided  Avoid exposure to all tobacco products.   Read and consider all information provided by the pharmacy regarding

## 2019-08-21 LAB
ALT SERPL-CCNC: 14 U/L (ref 10–40)
ANION GAP SERPL CALCULATED.3IONS-SCNC: 13 MMOL/L (ref 3–16)
BUN BLDV-MCNC: 18 MG/DL (ref 7–20)
CALCIUM SERPL-MCNC: 9.8 MG/DL (ref 8.3–10.6)
CHLORIDE BLD-SCNC: 102 MMOL/L (ref 99–110)
CHOLESTEROL, TOTAL: 146 MG/DL (ref 0–199)
CO2: 25 MMOL/L (ref 21–32)
CREAT SERPL-MCNC: 0.8 MG/DL (ref 0.6–1.2)
ESTIMATED AVERAGE GLUCOSE: 145.6 MG/DL
GFR AFRICAN AMERICAN: >60
GFR NON-AFRICAN AMERICAN: >60
GLUCOSE BLD-MCNC: 154 MG/DL (ref 70–99)
HBA1C MFR BLD: 6.7 %
HDLC SERPL-MCNC: 36 MG/DL (ref 40–60)
LDL CHOLESTEROL CALCULATED: 75 MG/DL
POTASSIUM SERPL-SCNC: 4.4 MMOL/L (ref 3.5–5.1)
SODIUM BLD-SCNC: 140 MMOL/L (ref 136–145)
TRIGL SERPL-MCNC: 174 MG/DL (ref 0–150)
VITAMIN B-12: 906 PG/ML (ref 211–911)
VLDLC SERPL CALC-MCNC: 35 MG/DL

## 2019-08-27 ENCOUNTER — INITIAL CONSULT (OUTPATIENT)
Dept: SURGERY | Age: 74
End: 2019-08-27
Payer: MEDICARE

## 2019-08-27 ENCOUNTER — HOSPITAL ENCOUNTER (OUTPATIENT)
Age: 74
Discharge: HOME | End: 2019-08-27
Payer: MEDICARE

## 2019-08-27 VITALS
SYSTOLIC BLOOD PRESSURE: 132 MMHG | WEIGHT: 171 LBS | HEIGHT: 63 IN | BODY MASS INDEX: 30.3 KG/M2 | DIASTOLIC BLOOD PRESSURE: 70 MMHG

## 2019-08-27 DIAGNOSIS — L72.3 INFECTED SEBACEOUS CYST: Primary | ICD-10-CM

## 2019-08-27 DIAGNOSIS — L08.9 INFECTED SEBACEOUS CYST: Primary | ICD-10-CM

## 2019-08-27 PROCEDURE — 10061 I&D ABSCESS COMP/MULTIPLE: CPT | Performed by: SURGERY

## 2019-08-27 PROCEDURE — 99202 OFFICE O/P NEW SF 15 MIN: CPT | Performed by: SURGERY

## 2019-08-27 PROCEDURE — 1123F ACP DISCUSS/DSCN MKR DOCD: CPT | Performed by: SURGERY

## 2019-08-27 PROCEDURE — 3017F COLORECTAL CA SCREEN DOC REV: CPT | Performed by: SURGERY

## 2019-08-27 PROCEDURE — G8399 PT W/DXA RESULTS DOCUMENT: HCPCS | Performed by: SURGERY

## 2019-08-27 PROCEDURE — G8427 DOCREV CUR MEDS BY ELIG CLIN: HCPCS | Performed by: SURGERY

## 2019-08-27 PROCEDURE — 1036F TOBACCO NON-USER: CPT | Performed by: SURGERY

## 2019-08-27 PROCEDURE — 4040F PNEUMOC VAC/ADMIN/RCVD: CPT | Performed by: SURGERY

## 2019-08-27 PROCEDURE — G8417 CALC BMI ABV UP PARAM F/U: HCPCS | Performed by: SURGERY

## 2019-08-27 PROCEDURE — 87205 SMEAR GRAM STAIN: CPT

## 2019-08-27 PROCEDURE — 1090F PRES/ABSN URINE INCON ASSESS: CPT | Performed by: SURGERY

## 2019-08-27 PROCEDURE — 87070 CULTURE OTHR SPECIMN AEROBIC: CPT

## 2019-08-27 RX ORDER — SULFAMETHOXAZOLE AND TRIMETHOPRIM 800; 160 MG/1; MG/1
1 TABLET ORAL 2 TIMES DAILY
Qty: 20 TABLET | Refills: 0 | Status: SHIPPED | OUTPATIENT
Start: 2019-08-27 | End: 2019-09-06

## 2019-08-29 LAB
GRAM STAIN RESULT: ABNORMAL
WOUND/ABSCESS: ABNORMAL

## 2019-09-24 ENCOUNTER — OFFICE VISIT (OUTPATIENT)
Dept: SURGERY | Age: 74
End: 2019-09-24
Payer: MEDICARE

## 2019-09-24 VITALS
BODY MASS INDEX: 30.65 KG/M2 | WEIGHT: 173 LBS | SYSTOLIC BLOOD PRESSURE: 124 MMHG | DIASTOLIC BLOOD PRESSURE: 70 MMHG | HEIGHT: 63 IN

## 2019-09-24 DIAGNOSIS — L72.3 INFECTED SEBACEOUS CYST: Primary | ICD-10-CM

## 2019-09-24 DIAGNOSIS — L08.9 INFECTED SEBACEOUS CYST: Primary | ICD-10-CM

## 2019-09-24 PROCEDURE — 1090F PRES/ABSN URINE INCON ASSESS: CPT | Performed by: SURGERY

## 2019-09-24 PROCEDURE — 99212 OFFICE O/P EST SF 10 MIN: CPT | Performed by: SURGERY

## 2019-09-24 PROCEDURE — 3017F COLORECTAL CA SCREEN DOC REV: CPT | Performed by: SURGERY

## 2019-09-24 PROCEDURE — G8427 DOCREV CUR MEDS BY ELIG CLIN: HCPCS | Performed by: SURGERY

## 2019-09-24 PROCEDURE — 1123F ACP DISCUSS/DSCN MKR DOCD: CPT | Performed by: SURGERY

## 2019-09-24 PROCEDURE — 4040F PNEUMOC VAC/ADMIN/RCVD: CPT | Performed by: SURGERY

## 2019-09-24 PROCEDURE — G8417 CALC BMI ABV UP PARAM F/U: HCPCS | Performed by: SURGERY

## 2019-09-24 PROCEDURE — 1036F TOBACCO NON-USER: CPT | Performed by: SURGERY

## 2019-09-24 PROCEDURE — G8399 PT W/DXA RESULTS DOCUMENT: HCPCS | Performed by: SURGERY

## 2019-09-24 NOTE — PROGRESS NOTES
[facility] SURGERY    CHIEF COMPLAINT: Back cyst    SUBJECTIVE:   Patient presents for follow up of her infected back cyst.  She reports there is some itching, but no further pain. It is stopped draining. Allergies   Allergen Reactions    Actos [Pioglitazone Hydrochloride]     Levaquin [Levofloxacin]      Outpatient Medications Marked as Taking for the 9/24/19 encounter (Office Visit) with Pura Heard MD   Medication Sig Dispense Refill    atorvastatin (LIPITOR) 40 MG tablet TAKE 1 TABLET DAILY 90 tablet 1    furosemide (LASIX) 20 MG tablet TAKE 1 TABLET DAILY AS NEEDED FOR SWELLING 30 tablet 3    JANUVIA 50 MG tablet TAKE 1 TABLET DAILY 90 tablet 1    lisinopril-hydrochlorothiazide (PRINZIDE;ZESTORETIC) 20-12.5 MG per tablet Take 1 tablet by mouth 2 times daily (Patient taking differently: Take 1 tablet by mouth daily ) 180 tablet 1    metFORMIN (GLUCOPHAGE) 1000 MG tablet TAKE 1 TABLET IN THE MORNING AND ONE AND ONE-HALF TABLETS IN THE EVENING (Patient taking differently: 2 times daily (with meals) ) 225 tablet 1    atenolol (TENORMIN) 25 MG tablet TAKE 1 TABLET DAILY 90 tablet 1    clotrimazole-betamethasone (LOTRISONE) 1-0.05 % cream Apply topically 2 times daily. 30 g 1    cyanocobalamin ([facility] VITAMIN B12) 1000 MCG tablet Take 1 tablet by mouth daily 30 tablet 3    folic acid (FOLVITE) 1 MG tablet Take 1 tablet by mouth daily 90 tablet 1    vitamin E 400 UNIT capsule Take 1 capsule by mouth 2 times daily (Patient taking differently: Take 400 Units by mouth daily ) 30 capsule 3    glipiZIDE (GLUCOTROL) 10 MG tablet TAKE 2 TABLETS TWICE A DAY BEFORE MEALS 360 tablet 3    Vitamin D (CHOLECALCIFEROL) 1000 UNITS CAPS capsule Take 2 capsules by mouth daily 1 capsule 0    Ascorbic Acid (VITAMIN C CR) 500 MG TBCR Take  by mouth.  aspirin 81 MG EC tablet Take 81 mg by mouth daily.          Past Medical History:   Diagnosis Date    CTS (carpal tunnel syndrome)     DM (diabetes Height: 5' 2.99\" (1.6 m)     Body mass index is 30.65 kg/m². ROS:  As per HPI, otherwise reviewed and negative    PHYSICAL EXAM:     Constitutional:  Well developed, well nourished, no acute distress, non-toxic appearance   Respiratory:  No respiratory distress, normal breath sounds, no rales, no wheezing   Cardiovascular:  Normal rate, normal rhythm, no murmurs. Integument: Back cyst with resolved erythema. There is minimal residual induration  Neurologic:  Alert & oriented x 3, normal motor function, normal sensory function, no focal deficits noted   Psychiatric:  Speech and behavior appropriate             DATA:  N/A    ASSESSMENT:   1. Infected sebaceous cyst           PLAN: Excision of sebaceous cyst. I explained the procedure including risks, benefits, and alternatives. Questions were answered and the patient agrees to proceed.

## 2019-10-09 LAB
CATARACTS: POSITIVE
DIABETIC RETINOPATHY: NEGATIVE
GLAUCOMA: NEGATIVE
INTRAOCULAR PRESSURE EYE: NORMAL
VISUAL ACUITY DISTANCE LEFT EYE: NORMAL
VISUAL ACUITY DISTANCE RIGHT EYE: NORMAL

## 2019-10-25 ENCOUNTER — TELEPHONE (OUTPATIENT)
Dept: SURGERY | Age: 74
End: 2019-10-25

## 2019-11-04 RX ORDER — GLIPIZIDE 10 MG/1
TABLET ORAL
Qty: 360 TABLET | Refills: 1 | Status: SHIPPED | OUTPATIENT
Start: 2019-11-04 | End: 2020-08-04

## 2019-11-06 ENCOUNTER — HOSPITAL ENCOUNTER (OUTPATIENT)
Dept: ENDOSCOPY | Age: 74
Setting detail: OUTPATIENT SURGERY
Discharge: HOME | End: 2019-11-06
Payer: MEDICARE

## 2019-11-06 VITALS — SYSTOLIC BLOOD PRESSURE: 154 MMHG | DIASTOLIC BLOOD PRESSURE: 54 MMHG | RESPIRATION RATE: 14 BRPM | HEART RATE: 93 BPM

## 2019-11-06 PROCEDURE — 6370000000 HC RX 637 (ALT 250 FOR IP): Performed by: OPHTHALMOLOGY

## 2019-11-06 PROCEDURE — 2500000003 HC RX 250 WO HCPCS: Performed by: OPHTHALMOLOGY

## 2019-11-06 PROCEDURE — 66761 REVISION OF IRIS: CPT

## 2019-11-06 RX ORDER — APRACLONIDINE HYDROCHLORIDE 5 MG/ML
1 SOLUTION/ DROPS OPHTHALMIC 2 TIMES DAILY PRN
Status: DISCONTINUED | OUTPATIENT
Start: 2019-11-06 | End: 2019-11-07 | Stop reason: HOSPADM

## 2019-11-06 RX ORDER — SOFT LENS RINSE,STORE SOLUTION
SOLUTION, NON-ORAL MISCELLANEOUS ONCE
Status: COMPLETED | OUTPATIENT
Start: 2019-11-06 | End: 2019-11-06

## 2019-11-06 RX ORDER — PROPARACAINE HYDROCHLORIDE 5 MG/ML
1 SOLUTION/ DROPS OPHTHALMIC ONCE
Status: COMPLETED | OUTPATIENT
Start: 2019-11-06 | End: 2019-11-06

## 2019-11-06 RX ORDER — PILOCARPINE HYDROCHLORIDE 20 MG/ML
1 SOLUTION/ DROPS OPHTHALMIC ONCE
Status: COMPLETED | OUTPATIENT
Start: 2019-11-06 | End: 2019-11-06

## 2019-11-06 RX ORDER — PREDNISOLONE ACETATE 10 MG/ML
1 SUSPENSION/ DROPS OPHTHALMIC ONCE
Status: COMPLETED | OUTPATIENT
Start: 2019-11-06 | End: 2019-11-06

## 2019-11-06 RX ADMIN — PROPARACAINE HYDROCHLORIDE 1 DROP: 5 SOLUTION/ DROPS OPHTHALMIC at 12:10

## 2019-11-06 RX ADMIN — PILOCARPINE HYDROCHLORIDE 1 DROP: 20 SOLUTION/ DROPS OPHTHALMIC at 11:42

## 2019-11-06 RX ADMIN — APRACLONIDINE 1 DROP: 5.75 SOLUTION OPHTHALMIC at 12:35

## 2019-11-06 RX ADMIN — PREDNISOLONE ACETATE 1 DROP: 10 SUSPENSION/ DROPS OPHTHALMIC at 12:36

## 2019-11-06 RX ADMIN — Medication: at 12:34

## 2019-11-06 RX ADMIN — APRACLONIDINE 1 DROP: 5.75 SOLUTION OPHTHALMIC at 11:44

## 2019-11-06 ASSESSMENT — PAIN - FUNCTIONAL ASSESSMENT: PAIN_FUNCTIONAL_ASSESSMENT: 0-10

## 2019-11-13 ENCOUNTER — HOSPITAL ENCOUNTER (OUTPATIENT)
Dept: ENDOSCOPY | Age: 74
Setting detail: OUTPATIENT SURGERY
Discharge: HOME | End: 2019-11-13
Payer: MEDICARE

## 2019-11-13 VITALS — SYSTOLIC BLOOD PRESSURE: 149 MMHG | HEART RATE: 92 BPM | RESPIRATION RATE: 14 BRPM | DIASTOLIC BLOOD PRESSURE: 56 MMHG

## 2019-11-13 PROCEDURE — 66761 REVISION OF IRIS: CPT

## 2019-11-13 PROCEDURE — 2500000003 HC RX 250 WO HCPCS: Performed by: OPHTHALMOLOGY

## 2019-11-13 PROCEDURE — 6370000000 HC RX 637 (ALT 250 FOR IP): Performed by: OPHTHALMOLOGY

## 2019-11-13 RX ORDER — SOFT LENS RINSE,STORE SOLUTION
SOLUTION, NON-ORAL MISCELLANEOUS ONCE
Status: COMPLETED | OUTPATIENT
Start: 2019-11-13 | End: 2019-11-13

## 2019-11-13 RX ORDER — PREDNISOLONE ACETATE 10 MG/ML
1 SUSPENSION/ DROPS OPHTHALMIC ONCE
Status: COMPLETED | OUTPATIENT
Start: 2019-11-13 | End: 2019-11-13

## 2019-11-13 RX ORDER — PILOCARPINE HYDROCHLORIDE 20 MG/ML
1 SOLUTION/ DROPS OPHTHALMIC ONCE
Status: COMPLETED | OUTPATIENT
Start: 2019-11-13 | End: 2019-11-13

## 2019-11-13 RX ORDER — PROPARACAINE HYDROCHLORIDE 5 MG/ML
1 SOLUTION/ DROPS OPHTHALMIC ONCE
Status: COMPLETED | OUTPATIENT
Start: 2019-11-13 | End: 2019-11-13

## 2019-11-13 RX ORDER — APRACLONIDINE HYDROCHLORIDE 5 MG/ML
1 SOLUTION/ DROPS OPHTHALMIC 2 TIMES DAILY PRN
Status: DISCONTINUED | OUTPATIENT
Start: 2019-11-13 | End: 2019-11-14 | Stop reason: HOSPADM

## 2019-11-13 RX ADMIN — APRACLONIDINE 1 DROP: 5.75 SOLUTION OPHTHALMIC at 11:57

## 2019-11-13 RX ADMIN — PROPARACAINE HYDROCHLORIDE 1 DROP: 5 SOLUTION/ DROPS OPHTHALMIC at 12:20

## 2019-11-13 RX ADMIN — PILOCARPINE HYDROCHLORIDE 1 DROP: 20 SOLUTION/ DROPS OPHTHALMIC at 11:56

## 2019-11-13 RX ADMIN — Medication: at 12:24

## 2019-11-13 RX ADMIN — PREDNISOLONE ACETATE 1 DROP: 10 SUSPENSION/ DROPS OPHTHALMIC at 12:30

## 2019-11-13 RX ADMIN — APRACLONIDINE 1 DROP: 5.75 SOLUTION OPHTHALMIC at 12:40

## 2019-11-13 ASSESSMENT — PAIN SCALES - GENERAL: PAINLEVEL_OUTOF10: 0

## 2019-11-13 ASSESSMENT — PAIN - FUNCTIONAL ASSESSMENT: PAIN_FUNCTIONAL_ASSESSMENT: 0-10

## 2019-11-15 RX ORDER — ATENOLOL 25 MG/1
TABLET ORAL
Qty: 90 TABLET | Refills: 1 | Status: SHIPPED | OUTPATIENT
Start: 2019-11-15 | End: 2020-05-04

## 2019-12-02 RX ORDER — FUROSEMIDE 20 MG/1
TABLET ORAL
Qty: 30 TABLET | Refills: 3 | Status: SHIPPED | OUTPATIENT
Start: 2019-12-02 | End: 2020-04-14

## 2019-12-20 ENCOUNTER — OFFICE VISIT (OUTPATIENT)
Dept: FAMILY MEDICINE CLINIC | Age: 74
End: 2019-12-20
Payer: MEDICARE

## 2019-12-20 VITALS
TEMPERATURE: 98.3 F | SYSTOLIC BLOOD PRESSURE: 129 MMHG | WEIGHT: 170 LBS | OXYGEN SATURATION: 94 % | BODY MASS INDEX: 30.12 KG/M2 | HEART RATE: 60 BPM | DIASTOLIC BLOOD PRESSURE: 63 MMHG

## 2019-12-20 DIAGNOSIS — E11.42 TYPE 2 DIABETES MELLITUS WITH DIABETIC POLYNEUROPATHY, WITHOUT LONG-TERM CURRENT USE OF INSULIN (HCC): Primary | ICD-10-CM

## 2019-12-20 DIAGNOSIS — E55.9 VITAMIN D DEFICIENCY: ICD-10-CM

## 2019-12-20 DIAGNOSIS — Z12.11 COLON CANCER SCREENING: ICD-10-CM

## 2019-12-20 DIAGNOSIS — I10 ESSENTIAL HYPERTENSION: ICD-10-CM

## 2019-12-20 DIAGNOSIS — K21.9 GASTROESOPHAGEAL REFLUX DISEASE WITHOUT ESOPHAGITIS: ICD-10-CM

## 2019-12-20 DIAGNOSIS — E53.8 VITAMIN B12 DEFICIENCY: ICD-10-CM

## 2019-12-20 DIAGNOSIS — E78.00 HYPERCHOLESTEREMIA: ICD-10-CM

## 2019-12-20 LAB
ALT SERPL-CCNC: 14 U/L (ref 10–40)
ANION GAP SERPL CALCULATED.3IONS-SCNC: 12 MMOL/L (ref 3–16)
BUN BLDV-MCNC: 14 MG/DL (ref 7–20)
CALCIUM SERPL-MCNC: 10.1 MG/DL (ref 8.3–10.6)
CHLORIDE BLD-SCNC: 103 MMOL/L (ref 99–110)
CHOLESTEROL, TOTAL: 160 MG/DL (ref 0–199)
CO2: 27 MMOL/L (ref 21–32)
CREAT SERPL-MCNC: 0.7 MG/DL (ref 0.6–1.2)
GFR AFRICAN AMERICAN: >60
GFR NON-AFRICAN AMERICAN: >60
GLUCOSE BLD-MCNC: 183 MG/DL (ref 70–99)
HDLC SERPL-MCNC: 37 MG/DL (ref 40–60)
LDL CHOLESTEROL CALCULATED: 100 MG/DL
POTASSIUM SERPL-SCNC: 4.5 MMOL/L (ref 3.5–5.1)
SODIUM BLD-SCNC: 142 MMOL/L (ref 136–145)
TRIGL SERPL-MCNC: 116 MG/DL (ref 0–150)
TSH REFLEX: 3.61 UIU/ML (ref 0.27–4.2)
VITAMIN B-12: 926 PG/ML (ref 211–911)
VITAMIN D 25-HYDROXY: 54.8 NG/ML
VLDLC SERPL CALC-MCNC: 23 MG/DL

## 2019-12-20 PROCEDURE — 3017F COLORECTAL CA SCREEN DOC REV: CPT | Performed by: FAMILY MEDICINE

## 2019-12-20 PROCEDURE — 99214 OFFICE O/P EST MOD 30 MIN: CPT | Performed by: FAMILY MEDICINE

## 2019-12-20 PROCEDURE — 1036F TOBACCO NON-USER: CPT | Performed by: FAMILY MEDICINE

## 2019-12-20 PROCEDURE — G8482 FLU IMMUNIZE ORDER/ADMIN: HCPCS | Performed by: FAMILY MEDICINE

## 2019-12-20 PROCEDURE — 3044F HG A1C LEVEL LT 7.0%: CPT | Performed by: FAMILY MEDICINE

## 2019-12-20 PROCEDURE — G8427 DOCREV CUR MEDS BY ELIG CLIN: HCPCS | Performed by: FAMILY MEDICINE

## 2019-12-20 PROCEDURE — 2022F DILAT RTA XM EVC RTNOPTHY: CPT | Performed by: FAMILY MEDICINE

## 2019-12-20 PROCEDURE — G8417 CALC BMI ABV UP PARAM F/U: HCPCS | Performed by: FAMILY MEDICINE

## 2019-12-20 PROCEDURE — 90688 IIV4 VACCINE SPLT 0.5 ML IM: CPT | Performed by: FAMILY MEDICINE

## 2019-12-20 PROCEDURE — 36415 COLL VENOUS BLD VENIPUNCTURE: CPT | Performed by: FAMILY MEDICINE

## 2019-12-20 PROCEDURE — G0008 ADMIN INFLUENZA VIRUS VAC: HCPCS | Performed by: FAMILY MEDICINE

## 2019-12-20 PROCEDURE — 4040F PNEUMOC VAC/ADMIN/RCVD: CPT | Performed by: FAMILY MEDICINE

## 2019-12-20 PROCEDURE — G8399 PT W/DXA RESULTS DOCUMENT: HCPCS | Performed by: FAMILY MEDICINE

## 2019-12-20 PROCEDURE — 1090F PRES/ABSN URINE INCON ASSESS: CPT | Performed by: FAMILY MEDICINE

## 2019-12-20 PROCEDURE — 1123F ACP DISCUSS/DSCN MKR DOCD: CPT | Performed by: FAMILY MEDICINE

## 2019-12-21 LAB
ESTIMATED AVERAGE GLUCOSE: 139.9 MG/DL
HBA1C MFR BLD: 6.5 %

## 2020-01-13 RX ORDER — SITAGLIPTIN 50 MG/1
TABLET, FILM COATED ORAL
Qty: 90 TABLET | Refills: 4 | Status: SHIPPED | OUTPATIENT
Start: 2020-01-13 | End: 2021-03-11

## 2020-02-03 NOTE — TELEPHONE ENCOUNTER
Need verify with patient on dosing, attempted to call no answer    Future appt scheduled 05/04/2020    Last appt 12/20/19

## 2020-02-10 RX ORDER — ATORVASTATIN CALCIUM 40 MG/1
TABLET, FILM COATED ORAL
Qty: 90 TABLET | Refills: 1 | Status: SHIPPED | OUTPATIENT
Start: 2020-02-10 | End: 2020-08-31

## 2020-04-14 RX ORDER — FUROSEMIDE 20 MG/1
TABLET ORAL
Qty: 30 TABLET | Refills: 0 | Status: SHIPPED | OUTPATIENT
Start: 2020-04-14 | End: 2020-05-12

## 2020-05-04 RX ORDER — ATENOLOL 25 MG/1
TABLET ORAL
Qty: 90 TABLET | Refills: 1 | Status: SHIPPED | OUTPATIENT
Start: 2020-05-04 | End: 2020-10-19

## 2020-05-04 RX ORDER — LISINOPRIL AND HYDROCHLOROTHIAZIDE 20; 12.5 MG/1; MG/1
TABLET ORAL
Qty: 180 TABLET | Refills: 0 | Status: SHIPPED | OUTPATIENT
Start: 2020-05-04 | End: 2020-11-02

## 2020-05-12 RX ORDER — FUROSEMIDE 20 MG/1
TABLET ORAL
Qty: 30 TABLET | Refills: 0 | Status: SHIPPED | OUTPATIENT
Start: 2020-05-12 | End: 2020-06-11

## 2020-06-11 RX ORDER — FUROSEMIDE 20 MG/1
TABLET ORAL
Qty: 30 TABLET | Refills: 1 | Status: SHIPPED | OUTPATIENT
Start: 2020-06-11 | End: 2020-07-07 | Stop reason: SDUPTHER

## 2020-06-12 ENCOUNTER — TELEPHONE (OUTPATIENT)
Dept: FAMILY MEDICINE CLINIC | Age: 75
End: 2020-06-12

## 2020-06-12 NOTE — TELEPHONE ENCOUNTER
Patient called stating the her DM testing supplies company was needing a new order for her tests strips- she uses [employer] test strips and said they have not received a fax back yet. I called [facility] at [phone] and they state they do not need a new order and not sure who she spoke to but they will get her order ready and ship out Monday. Patient notified.

## 2020-07-07 ENCOUNTER — OFFICE VISIT (OUTPATIENT)
Dept: FAMILY MEDICINE CLINIC | Age: 75
End: 2020-07-07
Payer: MEDICARE

## 2020-07-07 VITALS
DIASTOLIC BLOOD PRESSURE: 72 MMHG | HEART RATE: 58 BPM | SYSTOLIC BLOOD PRESSURE: 133 MMHG | OXYGEN SATURATION: 96 % | TEMPERATURE: 97.9 F | WEIGHT: 172 LBS | BODY MASS INDEX: 30.48 KG/M2

## 2020-07-07 LAB
BILIRUBIN, POC: NORMAL
BLOOD URINE, POC: NORMAL
CLARITY, POC: CLEAR
COLOR, POC: YELLOW
GLUCOSE URINE, POC: NORMAL
KETONES, POC: NORMAL
LEUKOCYTE EST, POC: NORMAL
NITRITE, POC: NORMAL
PH, POC: 5
PROTEIN, POC: NORMAL
SPECIFIC GRAVITY, POC: 1.01
UROBILINOGEN, POC: 0.2

## 2020-07-07 PROCEDURE — 4040F PNEUMOC VAC/ADMIN/RCVD: CPT | Performed by: FAMILY MEDICINE

## 2020-07-07 PROCEDURE — 2022F DILAT RTA XM EVC RTNOPTHY: CPT | Performed by: FAMILY MEDICINE

## 2020-07-07 PROCEDURE — 36415 COLL VENOUS BLD VENIPUNCTURE: CPT | Performed by: FAMILY MEDICINE

## 2020-07-07 PROCEDURE — G8510 SCR DEP NEG, NO PLAN REQD: HCPCS | Performed by: FAMILY MEDICINE

## 2020-07-07 PROCEDURE — G8427 DOCREV CUR MEDS BY ELIG CLIN: HCPCS | Performed by: FAMILY MEDICINE

## 2020-07-07 PROCEDURE — G8417 CALC BMI ABV UP PARAM F/U: HCPCS | Performed by: FAMILY MEDICINE

## 2020-07-07 PROCEDURE — 1036F TOBACCO NON-USER: CPT | Performed by: FAMILY MEDICINE

## 2020-07-07 PROCEDURE — G8399 PT W/DXA RESULTS DOCUMENT: HCPCS | Performed by: FAMILY MEDICINE

## 2020-07-07 PROCEDURE — 3017F COLORECTAL CA SCREEN DOC REV: CPT | Performed by: FAMILY MEDICINE

## 2020-07-07 PROCEDURE — 1090F PRES/ABSN URINE INCON ASSESS: CPT | Performed by: FAMILY MEDICINE

## 2020-07-07 PROCEDURE — 3046F HEMOGLOBIN A1C LEVEL >9.0%: CPT | Performed by: FAMILY MEDICINE

## 2020-07-07 PROCEDURE — 99214 OFFICE O/P EST MOD 30 MIN: CPT | Performed by: FAMILY MEDICINE

## 2020-07-07 PROCEDURE — 81002 URINALYSIS NONAUTO W/O SCOPE: CPT | Performed by: FAMILY MEDICINE

## 2020-07-07 PROCEDURE — 1123F ACP DISCUSS/DSCN MKR DOCD: CPT | Performed by: FAMILY MEDICINE

## 2020-07-07 RX ORDER — FUROSEMIDE 20 MG/1
TABLET ORAL
Qty: 60 TABLET | Refills: 5 | Status: SHIPPED | OUTPATIENT
Start: 2020-07-07 | End: 2020-11-09

## 2020-07-07 RX ORDER — FUROSEMIDE 20 MG/1
TABLET ORAL
Qty: 30 TABLET | Refills: 1 | OUTPATIENT
Start: 2020-07-07

## 2020-07-07 ASSESSMENT — PATIENT HEALTH QUESTIONNAIRE - PHQ9
SUM OF ALL RESPONSES TO PHQ9 QUESTIONS 1 & 2: 0
SUM OF ALL RESPONSES TO PHQ QUESTIONS 1-9: 0
1. LITTLE INTEREST OR PLEASURE IN DOING THINGS: 0
2. FEELING DOWN, DEPRESSED OR HOPELESS: 0
SUM OF ALL RESPONSES TO PHQ QUESTIONS 1-9: 0

## 2020-07-07 NOTE — PATIENT INSTRUCTIONS
Please read the healthy family handout that you were given and share it with your family. Please compare this printed medication list with your medications at home to be sure they are the same. If you have any medications that are different please contact us immediately at [phone]. Also review your allergies that we have listed, these may also include medications that you have not been able to tolerate, make sure everything listed is correct. If you have any allergies that are different please contact us immediately at [phone].

## 2020-07-07 NOTE — PROGRESS NOTES
Subjective:  Nuha Galindo is here to discuss the following issues. She has diabetes. Her blood sugars at times are elevated in the morning if she has a late night snack. Otherwise they are well controlled with no polydipsia or polyuria. She has a long history of bilateral lower extremity edema but at times this worsens especially with heat and humidity. No orthopnea no chest pain or chest pressure. In the heat with high humidity she will have some shortness of breath on exertion but this does not occur otherwise. She has essential hypertension and her blood pressures have been well controlled. She has B12 and vitamin D deficiency but continues on her supplements daily. No fever sweats or chills no vomiting or diarrhea  Social History     Tobacco Use   Smoking Status Former Smoker    Packs/day: 1.00    Years: 20.00    Pack years: 20.00    Last attempt to quit: 3/15/2003    Years since quittin.3   Smokeless Tobacco Never Used   Allergies:     Actos [pioglitazone hydrochloride] and Levaquin [levofloxacin]    Objective:  /72   Pulse 58   Temp 97.9 °F (36.6 °C) (Temporal)   Wt 172 lb (78 kg)   SpO2 96%   BMI 30.48 kg/m²    No acute distress, heart regular rate and rhythm without murmur, lungs clear to auscultation easy effort, abdomen soft nondistended, no clubbing or cyanosis    Assessment:  1. Type 2 diabetes mellitus with diabetic polyneuropathy, without long-term current use of insulin ([address]    2. Lower extremity edema    3. Essential hypertension    4. Vitamin B12 deficiency    5. Vitamin D deficiency            Plan:  Labs ordered  Increase Lasix  Repeatedly offered cardiac testing and she declines for now  Continue other medicines  Continue to monitor blood sugars  Her older sister is in a local nursing home with dementia  Follow-up in 4 months or as needed  \"Healthy Family Handout\" provided  Avoid exposure to all tobacco products.   Read and consider all information provided by the pharmacy regarding prescribed medications before use  Careful medical compliance  Proper diet and weight management   Otherwise continue current treatment plan  Call or return if symptoms are not well controlled  Go to ED if severe/significant symptoms occur    All medical conditions for this patient are stable unless otherwise indicated    Dariel Ricardo MD    This note was transcribed using a voice recognition software system. Proper technique and careful oversight were used to increase transcription accuracy but inadvertent errors may be present.

## 2020-07-08 LAB
ALT SERPL-CCNC: 12 U/L (ref 10–40)
ANION GAP SERPL CALCULATED.3IONS-SCNC: 15 MMOL/L (ref 3–16)
BUN BLDV-MCNC: 14 MG/DL (ref 7–20)
CALCIUM SERPL-MCNC: 10.2 MG/DL (ref 8.3–10.6)
CHLORIDE BLD-SCNC: 103 MMOL/L (ref 99–110)
CHOLESTEROL, TOTAL: 150 MG/DL (ref 0–199)
CO2: 23 MMOL/L (ref 21–32)
CREAT SERPL-MCNC: 0.9 MG/DL (ref 0.6–1.2)
ESTIMATED AVERAGE GLUCOSE: 145.6 MG/DL
GFR AFRICAN AMERICAN: >60
GFR NON-AFRICAN AMERICAN: >60
GLUCOSE BLD-MCNC: 102 MG/DL (ref 70–99)
HBA1C MFR BLD: 6.7 %
HDLC SERPL-MCNC: 35 MG/DL (ref 40–60)
LDL CHOLESTEROL CALCULATED: 82 MG/DL
POTASSIUM SERPL-SCNC: 3.9 MMOL/L (ref 3.5–5.1)
SODIUM BLD-SCNC: 141 MMOL/L (ref 136–145)
TRIGL SERPL-MCNC: 166 MG/DL (ref 0–150)
TSH REFLEX: 3.72 UIU/ML (ref 0.27–4.2)
VITAMIN B-12: 1011 PG/ML (ref 211–911)
VITAMIN D 25-HYDROXY: 60.9 NG/ML
VLDLC SERPL CALC-MCNC: 33 MG/DL

## 2020-08-04 RX ORDER — GLIPIZIDE 10 MG/1
TABLET ORAL
Qty: 360 TABLET | Refills: 1 | Status: SHIPPED | OUTPATIENT
Start: 2020-08-04 | End: 2021-06-08

## 2020-08-31 RX ORDER — ATORVASTATIN CALCIUM 40 MG/1
TABLET, FILM COATED ORAL
Qty: 90 TABLET | Refills: 1 | Status: SHIPPED | OUTPATIENT
Start: 2020-08-31 | End: 2021-03-17

## 2020-10-19 RX ORDER — ATENOLOL 25 MG/1
TABLET ORAL
Qty: 90 TABLET | Refills: 1 | Status: SHIPPED | OUTPATIENT
Start: 2020-10-19 | End: 2021-05-10

## 2020-10-22 ENCOUNTER — NURSE ONLY (OUTPATIENT)
Dept: FAMILY MEDICINE CLINIC | Age: 75
End: 2020-10-22
Payer: MEDICARE

## 2020-10-22 PROCEDURE — G0008 ADMIN INFLUENZA VIRUS VAC: HCPCS | Performed by: NURSE PRACTITIONER

## 2020-10-22 PROCEDURE — 90694 VACC AIIV4 NO PRSRV 0.5ML IM: CPT | Performed by: NURSE PRACTITIONER

## 2020-10-22 NOTE — PROGRESS NOTES
Vaccine Information Sheet, \"Influenza - Inactivated\"  given to Murtaza Ellsworth, or parent/legal guardian of  Murtaza Ellsworth and verbalized understanding. Patient responses:    Have you ever had a reaction to a flu vaccine? No  Do you have any current illness? No  Have you ever had Guillian Granby Syndrome? No  Do you have a serious allergy to any of the follow: Neomycin, Polymyxin, Thimerosal, eggs or egg products? No    Flu vaccine given per order. Please see immunization tab. Risks and benefits explained. Current VIS given.       Immunizations Administered     Name Date Dose Route    Influenza, Quadv, adjuvanted, 65 yrs +, IM, PF (Fluad) 10/22/2020 0.5 mL Intramuscular    Site: Deltoid- Left    Lot: [id number]    [address]: 68101-989-18

## 2020-11-02 RX ORDER — LISINOPRIL AND HYDROCHLOROTHIAZIDE 20; 12.5 MG/1; MG/1
TABLET ORAL
Qty: 180 TABLET | Refills: 1 | Status: SHIPPED | OUTPATIENT
Start: 2020-11-02 | End: 2021-10-19

## 2020-11-09 RX ORDER — FUROSEMIDE 20 MG/1
TABLET ORAL
Qty: 180 TABLET | Refills: 1 | Status: SHIPPED | OUTPATIENT
Start: 2020-11-09 | End: 2021-05-17

## 2020-11-09 NOTE — TELEPHONE ENCOUNTER
Refilled medication per verbal order from provider.   Future appt scheduled 11/20/2020  Last appt 07/07/2020

## 2020-11-20 ENCOUNTER — OFFICE VISIT (OUTPATIENT)
Dept: FAMILY MEDICINE CLINIC | Age: 75
End: 2020-11-20
Payer: MEDICARE

## 2020-11-20 VITALS — WEIGHT: 170.8 LBS | BODY MASS INDEX: 30.26 KG/M2

## 2020-11-20 LAB
ALT SERPL-CCNC: 13 U/L (ref 10–40)
ANION GAP SERPL CALCULATED.3IONS-SCNC: 12 MMOL/L (ref 3–16)
BUN BLDV-MCNC: 18 MG/DL (ref 7–20)
CALCIUM SERPL-MCNC: 10 MG/DL (ref 8.3–10.6)
CHLORIDE BLD-SCNC: 100 MMOL/L (ref 99–110)
CHOLESTEROL, TOTAL: 156 MG/DL (ref 0–199)
CO2: 27 MMOL/L (ref 21–32)
CREAT SERPL-MCNC: 0.9 MG/DL (ref 0.6–1.2)
CREATININE URINE POCT: 50
GFR AFRICAN AMERICAN: >60
GFR NON-AFRICAN AMERICAN: >60
GLUCOSE BLD-MCNC: 151 MG/DL (ref 70–99)
HDLC SERPL-MCNC: 36 MG/DL (ref 40–60)
LDL CHOLESTEROL CALCULATED: 89 MG/DL
MICROALBUMIN/CREAT 24H UR: 10 MG/G{CREAT}
MICROALBUMIN/CREAT UR-RTO: NORMAL
POTASSIUM SERPL-SCNC: 4.5 MMOL/L (ref 3.5–5.1)
SODIUM BLD-SCNC: 139 MMOL/L (ref 136–145)
TRIGL SERPL-MCNC: 155 MG/DL (ref 0–150)
VLDLC SERPL CALC-MCNC: 31 MG/DL

## 2020-11-20 PROCEDURE — 3044F HG A1C LEVEL LT 7.0%: CPT | Performed by: FAMILY MEDICINE

## 2020-11-20 PROCEDURE — G8399 PT W/DXA RESULTS DOCUMENT: HCPCS | Performed by: FAMILY MEDICINE

## 2020-11-20 PROCEDURE — G8417 CALC BMI ABV UP PARAM F/U: HCPCS | Performed by: FAMILY MEDICINE

## 2020-11-20 PROCEDURE — 4040F PNEUMOC VAC/ADMIN/RCVD: CPT | Performed by: FAMILY MEDICINE

## 2020-11-20 PROCEDURE — 1123F ACP DISCUSS/DSCN MKR DOCD: CPT | Performed by: FAMILY MEDICINE

## 2020-11-20 PROCEDURE — 1090F PRES/ABSN URINE INCON ASSESS: CPT | Performed by: FAMILY MEDICINE

## 2020-11-20 PROCEDURE — 3017F COLORECTAL CA SCREEN DOC REV: CPT | Performed by: FAMILY MEDICINE

## 2020-11-20 PROCEDURE — G8484 FLU IMMUNIZE NO ADMIN: HCPCS | Performed by: FAMILY MEDICINE

## 2020-11-20 PROCEDURE — 99214 OFFICE O/P EST MOD 30 MIN: CPT | Performed by: FAMILY MEDICINE

## 2020-11-20 PROCEDURE — 36415 COLL VENOUS BLD VENIPUNCTURE: CPT | Performed by: FAMILY MEDICINE

## 2020-11-20 PROCEDURE — 2022F DILAT RTA XM EVC RTNOPTHY: CPT | Performed by: FAMILY MEDICINE

## 2020-11-20 PROCEDURE — 82044 UR ALBUMIN SEMIQUANTITATIVE: CPT | Performed by: FAMILY MEDICINE

## 2020-11-20 PROCEDURE — 1036F TOBACCO NON-USER: CPT | Performed by: FAMILY MEDICINE

## 2020-11-20 PROCEDURE — G8427 DOCREV CUR MEDS BY ELIG CLIN: HCPCS | Performed by: FAMILY MEDICINE

## 2020-11-20 RX ORDER — ZOSTER VACCINE RECOMBINANT, ADJUVANTED 50 MCG/0.5
0.5 KIT INTRAMUSCULAR SEE ADMIN INSTRUCTIONS
Qty: 0.5 ML | Refills: 1 | Status: SHIPPED | OUTPATIENT
Start: 2020-11-20 | End: 2020-11-21

## 2020-11-20 NOTE — PATIENT INSTRUCTIONS
Please read the healthy family handout that you were given and share it with your family. Please compare this printed medication list with your medications at home to be sure they are the same. If you have any medications that are different please contact us immediately at [phone]. Also review your allergies that we have listed, these may also include medications that you have not been able to tolerate, make sure everything listed is correct. If you have any allergies that are different please contact us immediately at [phone].

## 2020-11-20 NOTE — PROGRESS NOTES
Subjective:  Inna Ruiz is here to discuss the following issues. She has diabetes. At home her blood sugars are very well controlled with no polydipsia or polyuria. She is compliant with all medications. She follows an appropriate diet. Her weight is stable. She has essential hypertension and blood pressures have been well controlled. No chest pain or chest pressure. She has elevated cholesterol and tries to follow an appropriate diet. She has arthritis with no recent joint redness swelling or warmth. She has some diffuse joint pain. Social History     Tobacco Use   Smoking Status Former Smoker    Packs/day: 1.00    Years: 20.00    Pack years: 20.00    Last attempt to quit: 3/15/2003    Years since quittin.6   Smokeless Tobacco Never Used   Allergies:     Actos [pioglitazone hydrochloride] and Levaquin [levofloxacin]    Objective: Wt 170 lb 12.8 oz (77.5 kg)   BMI 30.26 kg/m²    No acute distress, heart regular rate and rhythm without murmur, lungs clear to auscultation easy effort, abdomen soft nondistended, no clubbing or cyanosis    Assessment:  1. Type 2 diabetes mellitus with diabetic polyneuropathy, without long-term current use of insulin ([facility] [address]    2. Essential hypertension    3. Hypercholesteremia    4. Arthritis    5. Screening mammogram, encounter for            Plan:  Labs ordered  Continue to check blood sugars at home  Mammogram  She will get a shingles shot  Continue current medicines  She has been unable to visit her sister who resides in a nursing home  Follow-up in 4 months fasting or as needed  \"Healthy Family Handout\" provided  Avoid exposure to all tobacco products.   Read and consider all information provided by the pharmacy regarding prescribed medications before use  Careful medical compliance  Proper diet and weight management   Otherwise continue current treatment plan  Call or return if symptoms are not well controlled      All medical conditions for this patient are stable unless otherwise indicated    Hector Martins MD    This note was transcribed using a voice recognition software system. Proper technique and careful oversight were used to increase transcription accuracy but inadvertent errors may be present.

## 2020-11-21 LAB
ESTIMATED AVERAGE GLUCOSE: 139.9 MG/DL
HBA1C MFR BLD: 6.5 %

## 2021-03-11 RX ORDER — SITAGLIPTIN 50 MG/1
TABLET, FILM COATED ORAL
Qty: 90 TABLET | Refills: 1 | Status: SHIPPED | OUTPATIENT
Start: 2021-03-11 | End: 2021-09-20

## 2021-03-17 RX ORDER — ATORVASTATIN CALCIUM 40 MG/1
TABLET, FILM COATED ORAL
Qty: 90 TABLET | Refills: 1 | Status: SHIPPED | OUTPATIENT
Start: 2021-03-17 | End: 2021-09-13

## 2021-03-26 ENCOUNTER — OFFICE VISIT (OUTPATIENT)
Dept: FAMILY MEDICINE CLINIC | Age: 76
End: 2021-03-26
Payer: MEDICARE

## 2021-03-26 VITALS
TEMPERATURE: 97.8 F | HEART RATE: 52 BPM | BODY MASS INDEX: 30.48 KG/M2 | DIASTOLIC BLOOD PRESSURE: 56 MMHG | SYSTOLIC BLOOD PRESSURE: 108 MMHG | OXYGEN SATURATION: 95 % | WEIGHT: 172 LBS

## 2021-03-26 DIAGNOSIS — E53.8 VITAMIN B12 DEFICIENCY: ICD-10-CM

## 2021-03-26 DIAGNOSIS — G56.03 BILATERAL CARPAL TUNNEL SYNDROME: ICD-10-CM

## 2021-03-26 DIAGNOSIS — I10 ESSENTIAL HYPERTENSION: ICD-10-CM

## 2021-03-26 DIAGNOSIS — G60.3 IDIOPATHIC PROGRESSIVE NEUROPATHY: ICD-10-CM

## 2021-03-26 DIAGNOSIS — M48.9 CERVICAL SPINE DISEASE: ICD-10-CM

## 2021-03-26 DIAGNOSIS — E11.42 TYPE 2 DIABETES MELLITUS WITH DIABETIC POLYNEUROPATHY, WITHOUT LONG-TERM CURRENT USE OF INSULIN (HCC): Primary | ICD-10-CM

## 2021-03-26 LAB
ALT SERPL-CCNC: 14 U/L (ref 10–40)
ANION GAP SERPL CALCULATED.3IONS-SCNC: 13 MMOL/L (ref 3–16)
BUN BLDV-MCNC: 18 MG/DL (ref 7–20)
CALCIUM SERPL-MCNC: 10.2 MG/DL (ref 8.3–10.6)
CHLORIDE BLD-SCNC: 99 MMOL/L (ref 99–110)
CHOLESTEROL, TOTAL: 169 MG/DL (ref 0–199)
CO2: 27 MMOL/L (ref 21–32)
CREAT SERPL-MCNC: 0.9 MG/DL (ref 0.6–1.2)
FOLATE: 15.89 NG/ML (ref 4.78–24.2)
GFR AFRICAN AMERICAN: >60
GFR NON-AFRICAN AMERICAN: >60
GLUCOSE BLD-MCNC: 158 MG/DL (ref 70–99)
HDLC SERPL-MCNC: 33 MG/DL (ref 40–60)
LDL CHOLESTEROL CALCULATED: 92 MG/DL
POTASSIUM SERPL-SCNC: 4.4 MMOL/L (ref 3.5–5.1)
SODIUM BLD-SCNC: 139 MMOL/L (ref 136–145)
TRIGL SERPL-MCNC: 221 MG/DL (ref 0–150)
TSH REFLEX: 3.51 UIU/ML (ref 0.27–4.2)
VITAMIN B-12: 1201 PG/ML (ref 211–911)
VLDLC SERPL CALC-MCNC: 44 MG/DL

## 2021-03-26 PROCEDURE — 2022F DILAT RTA XM EVC RTNOPTHY: CPT | Performed by: FAMILY MEDICINE

## 2021-03-26 PROCEDURE — 1036F TOBACCO NON-USER: CPT | Performed by: FAMILY MEDICINE

## 2021-03-26 PROCEDURE — G8399 PT W/DXA RESULTS DOCUMENT: HCPCS | Performed by: FAMILY MEDICINE

## 2021-03-26 PROCEDURE — 99214 OFFICE O/P EST MOD 30 MIN: CPT | Performed by: FAMILY MEDICINE

## 2021-03-26 PROCEDURE — 1090F PRES/ABSN URINE INCON ASSESS: CPT | Performed by: FAMILY MEDICINE

## 2021-03-26 PROCEDURE — G8484 FLU IMMUNIZE NO ADMIN: HCPCS | Performed by: FAMILY MEDICINE

## 2021-03-26 PROCEDURE — 1123F ACP DISCUSS/DSCN MKR DOCD: CPT | Performed by: FAMILY MEDICINE

## 2021-03-26 PROCEDURE — 3046F HEMOGLOBIN A1C LEVEL >9.0%: CPT | Performed by: FAMILY MEDICINE

## 2021-03-26 PROCEDURE — 36415 COLL VENOUS BLD VENIPUNCTURE: CPT | Performed by: FAMILY MEDICINE

## 2021-03-26 PROCEDURE — 3017F COLORECTAL CA SCREEN DOC REV: CPT | Performed by: FAMILY MEDICINE

## 2021-03-26 PROCEDURE — G8427 DOCREV CUR MEDS BY ELIG CLIN: HCPCS | Performed by: FAMILY MEDICINE

## 2021-03-26 PROCEDURE — 4040F PNEUMOC VAC/ADMIN/RCVD: CPT | Performed by: FAMILY MEDICINE

## 2021-03-26 PROCEDURE — G8417 CALC BMI ABV UP PARAM F/U: HCPCS | Performed by: FAMILY MEDICINE

## 2021-03-26 ASSESSMENT — PATIENT HEALTH QUESTIONNAIRE - PHQ9
SUM OF ALL RESPONSES TO PHQ QUESTIONS 1-9: 0
SUM OF ALL RESPONSES TO PHQ9 QUESTIONS 1 & 2: 0
SUM OF ALL RESPONSES TO PHQ QUESTIONS 1-9: 0

## 2021-03-26 NOTE — PATIENT INSTRUCTIONS
Please read the healthy family handout that you were given and share it with your family. Please compare this printed medication list with your medications at home to be sure they are the same. If you have any medications that are different please contact us immediately at [phone]. Also review your allergies that we have listed, these may also include medications that you have not been able to tolerate, make sure everything listed is correct. If you have any allergies that are different please contact us immediately at [phone].

## 2021-03-26 NOTE — PROGRESS NOTES
Subjective:  Mikayla Cordero is here to discuss the following issues. She has diabetes and has had no related symptoms. No polydipsia. No vision changes. She has idiopathic progressive neuropathy. She has bilateral foot and ankle discomfort with numbness and heaviness. She will sometimes have pain. She has difficulty describing the symptoms. They are clearly worsening. No recent falls. She has hypertension and blood pressures have consistently been well controlled. She has B12 deficiency and is on a supplement. She has cervical spine disease and a history of bilateral carpal tunnel syndrome and will have numbness in her hands and forearms occasionally. This does not occur every day. She declines the need for further evaluation at this time  Social History     Tobacco Use   Smoking Status Former Smoker    Packs/day: 1.00    Years: 20.00    Pack years: 20.00    Quit date: 3/15/2003    Years since quittin.0   Smokeless Tobacco Never Used   Allergies:     Actos [pioglitazone hydrochloride] and Levaquin [levofloxacin]    Objective:  BP (!) 108/56   Pulse 52   Temp 97.8 °F (36.6 °C) (Oral)   Wt 172 lb (78 kg)   SpO2 95%   BMI 30.48 kg/m²    No acute distress, heart regular rate and rhythm without murmur, lungs clear to auscultation easy effort, abdomen soft nondistended, no clubbing or cyanosis no lower extremity edema normal sensation    Assessment:  1. Type 2 diabetes mellitus with diabetic polyneuropathy, without long-term current use of insulin ([address]    2. Idiopathic progressive neuropathy    3. Essential hypertension    4. Vitamin B12 deficiency    5. Cervical spine disease    6. Bilateral carpal tunnel syndrome            Plan:  Bilateral lower extremity EMG  Advise me if upper extremity symptoms worsen.   These may be related to cervical spine disease or carpal tunnel syndrome which have been concerns in the past.  Continue current medicines  Labs ordered  Her sister is residing in a local nursing home  Follow-up in 4 months or as needed  \"Healthy Family Handout\" provided  Avoid exposure to all tobacco products. Read and consider all information provided by the pharmacy regarding prescribed medications before use  Proper diet and weight management   Otherwise continue current treatment plan  Call or return if symptoms are not well controlled      All medical conditions for this patient are stable unless otherwise indicated    Mauro Hansen MD    This note was transcribed using a voice recognition software system. Proper technique and careful oversight were used to increase transcription accuracy but inadvertent errors may be present.

## 2021-03-27 LAB
ESTIMATED AVERAGE GLUCOSE: 145.6 MG/DL
HBA1C MFR BLD: 6.7 %

## 2021-05-10 RX ORDER — ATENOLOL 25 MG/1
TABLET ORAL
Qty: 90 TABLET | Refills: 3 | Status: SHIPPED | OUTPATIENT
Start: 2021-05-10 | End: 2022-05-02

## 2021-05-17 RX ORDER — FUROSEMIDE 20 MG/1
TABLET ORAL
Qty: 180 TABLET | Refills: 1 | Status: SHIPPED | OUTPATIENT
Start: 2021-05-17 | End: 2021-11-10

## 2021-06-08 RX ORDER — GLIPIZIDE 10 MG/1
TABLET ORAL
Qty: 360 TABLET | Refills: 3 | Status: SHIPPED | OUTPATIENT
Start: 2021-06-08

## 2021-06-15 ENCOUNTER — OFFICE VISIT (OUTPATIENT)
Dept: FAMILY MEDICINE CLINIC | Age: 76
End: 2021-06-15
Payer: MEDICARE

## 2021-06-15 VITALS
WEIGHT: 171 LBS | TEMPERATURE: 98.5 F | SYSTOLIC BLOOD PRESSURE: 128 MMHG | BODY MASS INDEX: 30.3 KG/M2 | HEART RATE: 67 BPM | OXYGEN SATURATION: 94 % | DIASTOLIC BLOOD PRESSURE: 64 MMHG

## 2021-06-15 DIAGNOSIS — B96.89 ACUTE BACTERIAL SINUSITIS: Primary | ICD-10-CM

## 2021-06-15 DIAGNOSIS — J01.90 ACUTE BACTERIAL SINUSITIS: Primary | ICD-10-CM

## 2021-06-15 PROCEDURE — 1036F TOBACCO NON-USER: CPT | Performed by: NURSE PRACTITIONER

## 2021-06-15 PROCEDURE — G8427 DOCREV CUR MEDS BY ELIG CLIN: HCPCS | Performed by: NURSE PRACTITIONER

## 2021-06-15 PROCEDURE — 4040F PNEUMOC VAC/ADMIN/RCVD: CPT | Performed by: NURSE PRACTITIONER

## 2021-06-15 PROCEDURE — 1123F ACP DISCUSS/DSCN MKR DOCD: CPT | Performed by: NURSE PRACTITIONER

## 2021-06-15 PROCEDURE — 1090F PRES/ABSN URINE INCON ASSESS: CPT | Performed by: NURSE PRACTITIONER

## 2021-06-15 PROCEDURE — 99213 OFFICE O/P EST LOW 20 MIN: CPT | Performed by: NURSE PRACTITIONER

## 2021-06-15 PROCEDURE — G8399 PT W/DXA RESULTS DOCUMENT: HCPCS | Performed by: NURSE PRACTITIONER

## 2021-06-15 PROCEDURE — G8417 CALC BMI ABV UP PARAM F/U: HCPCS | Performed by: NURSE PRACTITIONER

## 2021-06-15 PROCEDURE — 3017F COLORECTAL CA SCREEN DOC REV: CPT | Performed by: NURSE PRACTITIONER

## 2021-06-15 RX ORDER — DOXYCYCLINE HYCLATE 100 MG
100 TABLET ORAL 2 TIMES DAILY
Qty: 20 TABLET | Refills: 0 | Status: SHIPPED | OUTPATIENT
Start: 2021-06-15 | End: 2021-06-25

## 2021-06-15 ASSESSMENT — ENCOUNTER SYMPTOMS
SHORTNESS OF BREATH: 0
SINUS PAIN: 1
EYES NEGATIVE: 1
SINUS PRESSURE: 1
COUGH: 1
WHEEZING: 0
ALLERGIC/IMMUNOLOGIC NEGATIVE: 1

## 2021-06-15 NOTE — PATIENT INSTRUCTIONS
An After Visit Summary was printed and given to the patient. Patient Education      Drink plenty of fluids, take all doses of antibiotics and Patient to f/u if no better or worsening of symptoms. Sinusitis: Care Instructions  Your Care Instructions     Sinusitis is an infection of the lining of the sinus cavities in your head. Sinusitis often follows a cold. It causes pain and pressure in your head and face. In most cases, sinusitis gets better on its own in 1 to 2 weeks. But some mild symptoms may last for several weeks. Sometimes antibiotics are needed. Follow-up care is a key part of your treatment and safety. Be sure to make and go to all appointments, and call your doctor if you are having problems. It's also a good idea to know your test results and keep a list of the medicines you take. How can you care for yourself at home? · Take an over-the-counter pain medicine, such as acetaminophen (Tylenol), ibuprofen (Advil, Motrin), or naproxen (Aleve). Read and follow all instructions on the label. · If the doctor prescribed antibiotics, take them as directed. Do not stop taking them just because you feel better. You need to take the full course of antibiotics. · Be careful when taking over-the-counter cold or flu medicines and Tylenol at the same time. Many of these medicines have acetaminophen, which is Tylenol. Read the labels to make sure that you are not taking more than the recommended dose. Too much acetaminophen (Tylenol) can be harmful. · Breathe warm, moist air from a steamy shower, a hot bath, or a sink filled with hot water. Avoid cold, dry air. Using a humidifier in your home may help. Follow the directions for cleaning the machine. · Use saline (saltwater) nasal washes. This can help keep your nasal passages open and wash out mucus and bacteria. You can buy saline nose drops at a grocery store or drugstore.  Or you can make your own at home by adding 1 teaspoon of salt and 1 teaspoon of baking soda to 2 cups of distilled water. If you make your own, fill a bulb syringe with the solution, insert the tip into your nostril, and squeeze gently. Ray Fabiano your nose. · Put a hot, wet towel or a warm gel pack on your face 3 or 4 times a day for 5 to 10 minutes each time. · Try a decongestant nasal spray like oxymetazoline (Afrin). Do not use it for more than 3 days in a row. Using it for more than 3 days can make your congestion worse. When should you call for help? Call your doctor now or seek immediate medical care if:    · You have new or worse swelling or redness in your face or around your eyes.     · You have a new or higher fever. Watch closely for changes in your health, and be sure to contact your doctor if:    · You have new or worse facial pain.     · The mucus from your nose becomes thicker (like pus) or has new blood in it.     · You are not getting better as expected. Where can you learn more? Go to [email or web address] and sign in to your [employer]entioned account. Enter O233 in the FightMe box to learn more about \"Sinusitis: Care Instructions. \"     If you do not have an account, please click on the \"Sign Up Now\" link. Current as of: December 2, 2020               Content Version: 12.8  © 2006-2021 [employer]. Care instructions adapted under license by [facility] ([facility]). If you have questions about a medical condition or this instruction, always ask your healthcare professional. [place] [address] any warranty or liability for your use of this information. Patient Education        doxycycline (oral/injection)  Pronunciation:  DOX sharmila villarreal  Brand:  Acticlate, Adoxa, Alodox, Avidoxy, Doryx, Mondoxyne NL, Monodox, Morgidox, Okebo, Oracea, Oraxyl, Targadox, Vibramycin  What is the most important information I should know about doxycycline? You should not take this medicine if you are allergic to any tetracycline antibiotic. medical treatment if you have a serious drug reaction that can affect many parts of your body. Symptoms may include: skin rash, fever, swollen glands, flu-like symptoms, muscle aches, severe weakness, unusual bruising, or yellowing of your skin or eyes. This reaction may occur several weeks after you began using doxycycline. Call your doctor at once if you have:  · severe stomach pain, diarrhea that is watery or bloody;  · throat irritation, trouble swallowing;  · chest pain, irregular heart rhythm, feeling short of breath;  · little or no urination;  · low white blood cell counts --fever, chills, swollen glands, body aches, weakness, pale skin, easy bruising or bleeding;  · increased pressure inside the skull --severe headaches, ringing in your ears, dizziness, nausea, vision problems, pain behind your eyes; or  · signs of liver or pancreas problems --loss of appetite, upper stomach pain (that may spread to your back), tiredness, nausea or vomiting, fast heart rate, dark urine, jaundice (yellowing of the skin or eyes). Common side effects may include:  · nausea, vomiting, upset stomach, loss of appetite;  · mild diarrhea;  · skin rash or itching;  · darkened skin color; or  · vaginal itching or discharge. This is not a complete list of side effects and others may occur. Call your doctor for medical advice about side effects. You may report side effects to FDA at 0-016-FDA-4261. What other drugs will affect doxycycline? Sometimes it is not safe to use certain medications at the same time. Some drugs can affect your blood levels of other drugs you take, which may increase side effects or make the medications less effective. Other drugs may affect doxycycline, including prescription and over-the-counter medicines, vitamins, and herbal products. Tell your doctor about all your current medicines and any medicine you start or stop using. Where can I get more information?   Your pharmacist can provide more information about doxycycline. Remember, keep this and all other medicines out of the reach of children, never share your medicines with others, and use this medication only for the indication prescribed. Every effort has been made to ensure that the information provided by [address] is accurate, up-to-date, and complete, but no guarantee is made to that effect. Drug information contained herein may be time sensitive. [facility] information has been compiled for use by healthcare practitioners and consumers in the [place] and therefore [facility] does not warrant that uses outside of the [place] are appropriate, unless specifically indicated otherwise. [facility]'s drug information does not endorse drugs, diagnose patients or recommend therapy. [facility]'s drug information is an informational resource designed to assist licensed healthcare practitioners in caring for their patients and/or to serve consumers viewing this service as a supplement to, and not a substitute for, the expertise, skill, knowledge and judgment of healthcare practitioners. The absence of a warning for a given drug or drug combination in no way should be construed to indicate that the drug or drug combination is safe, effective or appropriate for any given patient. [facility] does not assume any responsibility for any aspect of healthcare administered with the aid of information [facility] provides. The information contained herein is not intended to cover all possible uses, directions, precautions, warnings, drug interactions, allergic reactions, or adverse effects. If you have questions about the drugs you are taking, check with your doctor, nurse or pharmacist.  Copyright 2155-7080 [address]: .. Revision date: 11/4/2020. Care instructions adapted under license by [facility] ([facility]).  If you have questions about a medical condition or this instruction, always ask your healthcare professional. [employer] disclaims any warranty or liability for your use of this information.

## 2021-06-15 NOTE — PROGRESS NOTES
Subjective:      Patient ID: Glen Krishna is a 77 y.o. female. Chief Complaint   Patient presents with    Congestion        HPI    Patient presents today with nasal congestion, mild frontal sinus pressure, headache, malaise, feeling of fullness in ears and nonproductive cough. Patient denies fever however she has had chills. Patient reports some pain in the left mid back with deep inspiration. Patient reports symptoms have been present for at 2 weeks. Patient has history of pneumonia and pleurisy. Review of Systems   Constitutional: Positive for appetite change and chills. Negative for fever. HENT: Positive for congestion, postnasal drip, sinus pressure and sinus pain. Eyes: Negative. Respiratory: Positive for cough. Negative for shortness of breath and wheezing. Cardiovascular: Negative. Endocrine: Negative. Musculoskeletal: Negative. Allergic/Immunologic: Negative. Neurological: Negative.         Patient Active Problem List   Diagnosis    Protein in urine    CTS (carpal tunnel syndrome)    Lung mass    Vitamin D deficiency    Non-rheumatic mitral regurgitation    Elevated LFTs    Osteopenia    Type 2 diabetes mellitus with diabetic polyneuropathy, without long-term current use of insulin ([facility])    Essential hypertension    Hypercholesteremia    Cervical spine disease    Gastroesophageal reflux disease without esophagitis    Lower extremity edema    Arthritis    Vitamin B12 deficiency    Idiopathic progressive neuropathy       Outpatient Medications Marked as Taking for the 6/15/21 encounter (Office Visit) with NEYMAR De Jesus CNP   Medication Sig Dispense Refill    glipiZIDE (GLUCOTROL) 10 MG tablet TAKE 2 TABLETS TWICE A DAY BEFORE MEALS 360 tablet 3    furosemide (LASIX) 20 MG tablet TAKE 1 TO 2 TABLETS BY MOUTH EVERY DAY AS NEEDED FOR SWELLING 180 tablet 1    atenolol (TENORMIN) 25 MG tablet TAKE 1 TABLET DAILY 90 tablet 3    metFORMIN (GLUCOPHAGE) 1000 MG tablet TAKE 1 TABLET TWICE A DAY WITH MEALS 180 tablet 1    atorvastatin (LIPITOR) 40 MG tablet TAKE 1 TABLET DAILY 90 tablet 1    JANUVIA 50 MG tablet TAKE 1 TABLET DAILY 90 tablet 1    lisinopril-hydroCHLOROthiazide (PRINZIDE;ZESTORETIC) 20-12.5 MG per tablet TAKE 1 TABLET TWICE A  tablet 1    cyanocobalamin ([employer] VITAMIN B12) 1000 MCG tablet Take 1 tablet by mouth daily 30 tablet 3    vitamin E 400 UNIT capsule Take 1 capsule by mouth 2 times daily (Patient taking differently: Take 400 Units by mouth daily ) 30 capsule 3    Vitamin D (CHOLECALCIFEROL) 1000 UNITS CAPS capsule Take 2 capsules by mouth daily 1 capsule 0    Ascorbic Acid (VITAMIN C CR) 500 MG TBCR Take  by mouth.  aspirin 81 MG EC tablet Take 81 mg by mouth daily. Allergies   Allergen Reactions    Actos [Pioglitazone Hydrochloride]     Levaquin [Levofloxacin]        Social History     Tobacco Use    Smoking status: Former Smoker     Packs/day: 1.00     Years: 20.00     Pack years: 20.00     Quit date: 3/15/2003     Years since quittin.2    Smokeless tobacco: Never Used   Substance Use Topics    Alcohol use: No       Objective:   /64   Pulse 67   Temp 98.5 °F (36.9 °C) (Oral)   Wt 171 lb (77.6 kg)   SpO2 94%   BMI 30.30 kg/m²     Physical Exam  Constitutional:       General: She is not in acute distress. Appearance: She is not ill-appearing or toxic-appearing. HENT:      Head: Normocephalic and atraumatic. Right Ear: Ear canal and external ear normal. A middle ear effusion is present. Left Ear: Ear canal and external ear normal. A middle ear effusion is present. Nose:      Right Sinus: Frontal sinus tenderness present. No maxillary sinus tenderness. Left Sinus: Frontal sinus tenderness present. No maxillary sinus tenderness. Mouth/Throat:      Lips: Pink. Mouth: Mucous membranes are moist.      Pharynx: Posterior oropharyngeal erythema present.  No oropharyngeal exudate. Eyes:      Conjunctiva/sclera: Conjunctivae normal.   Neck:      Thyroid: No thyromegaly. Cardiovascular:      Rate and Rhythm: Normal rate and regular rhythm. Heart sounds: Murmur heard. No friction rub. No gallop. Pulmonary:      Effort: Pulmonary effort is normal. No accessory muscle usage or respiratory distress. Breath sounds: Normal breath sounds. No decreased breath sounds, wheezing, rhonchi or rales. Abdominal:      General: Bowel sounds are normal.      Palpations: Abdomen is soft. Musculoskeletal:         General: Normal range of motion. Cervical back: Normal range of motion and neck supple. Lymphadenopathy:      Cervical: No cervical adenopathy. Skin:     General: Skin is warm and dry. Neurological:      Mental Status: She is alert and oriented to person, place, and time. Psychiatric:         Behavior: Behavior is cooperative. Assessment/Plan:   1. Acute bacterial sinusitis  Patient presents today with complaints of nasal congestion cough, sinus pain and pressure, postnasal drip and intermittent nonproductive cough. Patient reports symptoms have been present for 2 weeks with no improvement. Patient is concerned as she does have a history pneumonia. On exam lungs are clear to auscultation, pulse ox is 94% on room air, patient has frontal sinus tenderness and postnasal drip noted on exam.  Patient also with bilateral serous effusion. Recommend treatment as below. Advised to drink plenty of fluids, take antibiotics as prescribed and patient to follow-up if no better worsening of symptoms. Patient verbalized understanding agreeable plan. - doxycycline hyclate (VIBRA-TABS) 100 MG tablet; Take 1 tablet by mouth 2 times daily for 10 days  Dispense: 20 tablet;  Refill: 0

## 2021-08-23 ENCOUNTER — OFFICE VISIT (OUTPATIENT)
Dept: FAMILY MEDICINE CLINIC | Age: 76
End: 2021-08-23
Payer: MEDICARE

## 2021-08-23 VITALS
HEART RATE: 54 BPM | OXYGEN SATURATION: 94 % | BODY MASS INDEX: 30.48 KG/M2 | SYSTOLIC BLOOD PRESSURE: 118 MMHG | TEMPERATURE: 98.4 F | DIASTOLIC BLOOD PRESSURE: 57 MMHG | WEIGHT: 172 LBS

## 2021-08-23 DIAGNOSIS — L57.0 AK (ACTINIC KERATOSIS): ICD-10-CM

## 2021-08-23 DIAGNOSIS — I10 ESSENTIAL HYPERTENSION: ICD-10-CM

## 2021-08-23 DIAGNOSIS — E53.8 VITAMIN B12 DEFICIENCY: ICD-10-CM

## 2021-08-23 DIAGNOSIS — E11.42 TYPE 2 DIABETES MELLITUS WITH DIABETIC POLYNEUROPATHY, WITHOUT LONG-TERM CURRENT USE OF INSULIN (HCC): Primary | ICD-10-CM

## 2021-08-23 PROCEDURE — G8417 CALC BMI ABV UP PARAM F/U: HCPCS | Performed by: FAMILY MEDICINE

## 2021-08-23 PROCEDURE — 3017F COLORECTAL CA SCREEN DOC REV: CPT | Performed by: FAMILY MEDICINE

## 2021-08-23 PROCEDURE — G8427 DOCREV CUR MEDS BY ELIG CLIN: HCPCS | Performed by: FAMILY MEDICINE

## 2021-08-23 PROCEDURE — 99214 OFFICE O/P EST MOD 30 MIN: CPT | Performed by: FAMILY MEDICINE

## 2021-08-23 PROCEDURE — 1123F ACP DISCUSS/DSCN MKR DOCD: CPT | Performed by: FAMILY MEDICINE

## 2021-08-23 PROCEDURE — G8399 PT W/DXA RESULTS DOCUMENT: HCPCS | Performed by: FAMILY MEDICINE

## 2021-08-23 PROCEDURE — 4040F PNEUMOC VAC/ADMIN/RCVD: CPT | Performed by: FAMILY MEDICINE

## 2021-08-23 PROCEDURE — 3044F HG A1C LEVEL LT 7.0%: CPT | Performed by: FAMILY MEDICINE

## 2021-08-23 PROCEDURE — 1090F PRES/ABSN URINE INCON ASSESS: CPT | Performed by: FAMILY MEDICINE

## 2021-08-23 PROCEDURE — 2022F DILAT RTA XM EVC RTNOPTHY: CPT | Performed by: FAMILY MEDICINE

## 2021-08-23 PROCEDURE — 1036F TOBACCO NON-USER: CPT | Performed by: FAMILY MEDICINE

## 2021-08-23 NOTE — PATIENT INSTRUCTIONS
Please read the healthy family handout that you were given and share it with your family. Please compare this printed medication list with your medications at home to be sure they are the same. If you have any medications that are different please contact us immediately at [phone]. Also review your allergies that we have listed, these may also include medications that you have not been able to tolerate, make sure everything listed is correct. If you have any allergies that are different please contact us immediately at [phone].

## 2021-08-23 NOTE — PROGRESS NOTES
Subjective:  Ellie Covarrubias is here to discuss the following issues. She has diabetes and her blood sugars have been very well controlled. No polydipsia or polyuria. She has essential hypertension and her blood pressures have also been well controlled. No symptomatic low readings. No chest pain or pressure. She has B12 deficiency and continues on her supplement. She is due for blood work. She has some yellowish crusty areas across her face. No history of skin cancer. No other changes in her home skin exam  Social History     Tobacco Use   Smoking Status Former Smoker    Packs/day: 1.00    Years: 20.00    Pack years: 20.00    Quit date: 3/15/2003    Years since quittin.4   Smokeless Tobacco Never Used   Allergies:     Actos [pioglitazone hydrochloride] and Levaquin [levofloxacin]    Objective:  BP (!) 118/57   Pulse 54   Temp 98.4 °F (36.9 °C) (Oral)   Wt 172 lb (78 kg)   SpO2 94%   BMI 30.48 kg/m²    No acute distress, heart regular rate and rhythm without murmur, lungs clear to auscultation easy effort, abdomen soft nondistended, no clubbing or cyanosis numerous yellow crusted lesions noted    Assessment:  1. Type 2 diabetes mellitus with diabetic polyneuropathy, without long-term current use of insulin ([address]    2. Essential hypertension    3. Vitamin B12 deficiency    4. AK (actinic keratosis)            Plan:  Labs ordered  Dermatology referral  Continue current medicines  Fit test  Foot exam  Follow-up in 4 months or as needed  The diagnoses listed in the assessment above are stable unless otherwise indicated. Age-specific preventative health recommendations were reviewed and the [facility]" was provided. Avoid exposure to tobacco products. Follow CDC guidelines for covid-19 prevention.   Read and consider all information provided by the pharmacy regarding prescribed medications before use    Call or return for any problems that arise before the next scheduled christopher Strickland MD    This note was transcribed using a voice recognition software system. Proper technique and careful oversight were used to increase transcription accuracy but inadvertent errors may be present.

## 2021-08-27 ENCOUNTER — NURSE ONLY (OUTPATIENT)
Dept: FAMILY MEDICINE CLINIC | Age: 76
End: 2021-08-27
Payer: MEDICARE

## 2021-08-27 DIAGNOSIS — E53.8 VITAMIN B12 DEFICIENCY: ICD-10-CM

## 2021-08-27 DIAGNOSIS — I10 ESSENTIAL HYPERTENSION: ICD-10-CM

## 2021-08-27 DIAGNOSIS — L57.0 AK (ACTINIC KERATOSIS): ICD-10-CM

## 2021-08-27 DIAGNOSIS — Z12.11 COLON CANCER SCREENING: Primary | ICD-10-CM

## 2021-08-27 DIAGNOSIS — E11.42 TYPE 2 DIABETES MELLITUS WITH DIABETIC POLYNEUROPATHY, WITHOUT LONG-TERM CURRENT USE OF INSULIN (HCC): ICD-10-CM

## 2021-08-27 DIAGNOSIS — G60.3 IDIOPATHIC PROGRESSIVE NEUROPATHY: ICD-10-CM

## 2021-08-27 LAB
ALT SERPL-CCNC: 13 U/L (ref 10–40)
ANION GAP SERPL CALCULATED.3IONS-SCNC: 10 MMOL/L (ref 3–16)
BUN BLDV-MCNC: 22 MG/DL (ref 7–20)
CALCIUM SERPL-MCNC: 9.9 MG/DL (ref 8.3–10.6)
CHLORIDE BLD-SCNC: 101 MMOL/L (ref 99–110)
CHOLESTEROL, TOTAL: 157 MG/DL (ref 0–199)
CO2: 26 MMOL/L (ref 21–32)
CONTROL: NORMAL
CREAT SERPL-MCNC: 1 MG/DL (ref 0.6–1.2)
GFR AFRICAN AMERICAN: >60
GFR NON-AFRICAN AMERICAN: 54
GLUCOSE BLD-MCNC: 137 MG/DL (ref 70–99)
HDLC SERPL-MCNC: 31 MG/DL (ref 40–60)
HEMOCCULT STL QL: NEGATIVE
LDL CHOLESTEROL CALCULATED: 89 MG/DL
POTASSIUM SERPL-SCNC: 4.2 MMOL/L (ref 3.5–5.1)
SODIUM BLD-SCNC: 137 MMOL/L (ref 136–145)
TRIGL SERPL-MCNC: 184 MG/DL (ref 0–150)
VITAMIN B-12: 512 PG/ML (ref 211–911)
VLDLC SERPL CALC-MCNC: 37 MG/DL

## 2021-08-27 PROCEDURE — 82274 ASSAY TEST FOR BLOOD FECAL: CPT | Performed by: FAMILY MEDICINE

## 2021-08-27 PROCEDURE — 36415 COLL VENOUS BLD VENIPUNCTURE: CPT | Performed by: FAMILY MEDICINE

## 2021-08-27 NOTE — ADDENDUM NOTE
Addended by: Chan Montanez on: 8/27/2021 01:59 PM     Modules accepted: Orders Problem: Mobility  Goal: Risk for activity intolerance will decrease  Outcome: PROGRESSING SLOWER THAN EXPECTED  Pt educated on need to mobilize early and often. Pt verbalizes understanding but is in too much pain

## 2021-08-28 LAB
ESTIMATED AVERAGE GLUCOSE: 142.7 MG/DL
HBA1C MFR BLD: 6.6 %

## 2021-08-31 ENCOUNTER — VIRTUAL VISIT (OUTPATIENT)
Dept: FAMILY MEDICINE CLINIC | Age: 76
End: 2021-08-31

## 2021-08-31 ENCOUNTER — TELEPHONE (OUTPATIENT)
Dept: FAMILY MEDICINE CLINIC | Age: 76
End: 2021-08-31

## 2021-08-31 DIAGNOSIS — N28.9 DECREASED RENAL FUNCTION: Primary | ICD-10-CM

## 2021-09-13 RX ORDER — ATORVASTATIN CALCIUM 40 MG/1
TABLET, FILM COATED ORAL
Qty: 90 TABLET | Refills: 1 | Status: SHIPPED | OUTPATIENT
Start: 2021-09-13 | End: 2022-02-17

## 2021-09-14 ENCOUNTER — NURSE ONLY (OUTPATIENT)
Dept: FAMILY MEDICINE CLINIC | Age: 76
End: 2021-09-14
Payer: MEDICARE

## 2021-09-14 DIAGNOSIS — N28.9 DECREASED RENAL FUNCTION: ICD-10-CM

## 2021-09-14 LAB
ANION GAP SERPL CALCULATED.3IONS-SCNC: 11 MMOL/L (ref 3–16)
BUN BLDV-MCNC: 22 MG/DL (ref 7–20)
CALCIUM SERPL-MCNC: 9.8 MG/DL (ref 8.3–10.6)
CHLORIDE BLD-SCNC: 102 MMOL/L (ref 99–110)
CO2: 26 MMOL/L (ref 21–32)
CREAT SERPL-MCNC: 1 MG/DL (ref 0.6–1.2)
GFR AFRICAN AMERICAN: >60
GFR NON-AFRICAN AMERICAN: 54
GLUCOSE BLD-MCNC: 124 MG/DL (ref 70–99)
POTASSIUM SERPL-SCNC: 4.5 MMOL/L (ref 3.5–5.1)
SODIUM BLD-SCNC: 139 MMOL/L (ref 136–145)

## 2021-09-14 PROCEDURE — 36415 COLL VENOUS BLD VENIPUNCTURE: CPT | Performed by: FAMILY MEDICINE

## 2021-09-20 RX ORDER — SITAGLIPTIN 50 MG/1
TABLET, FILM COATED ORAL
Qty: 90 TABLET | Refills: 1 | Status: SHIPPED | OUTPATIENT
Start: 2021-09-20 | End: 2022-03-25

## 2021-10-19 RX ORDER — LISINOPRIL AND HYDROCHLOROTHIAZIDE 20; 12.5 MG/1; MG/1
TABLET ORAL
Qty: 180 TABLET | Refills: 3 | Status: SHIPPED | OUTPATIENT
Start: 2021-10-19

## 2021-10-19 NOTE — TELEPHONE ENCOUNTER
Anai Marmolejo is requesting refill(s)   Last OV 4/16/21 (pertaining to medication)  LR 9/27/21 (per medication requested)  Next office visit scheduled or attempted No   If no, reason:

## 2021-11-10 RX ORDER — FUROSEMIDE 20 MG/1
TABLET ORAL
Qty: 180 TABLET | Refills: 1 | Status: SHIPPED | OUTPATIENT
Start: 2021-11-10 | End: 2022-05-13

## 2021-11-10 NOTE — TELEPHONE ENCOUNTER
Future appt scheduled 12/23/2021              Last appt 08/23/2021      Last Written 05/17/2021    furosemide (LASIX) 20 MG tablet  #180  1 RF         Refilled medication per verbal order from provider.

## 2021-12-29 ENCOUNTER — OFFICE VISIT (OUTPATIENT)
Dept: FAMILY MEDICINE CLINIC | Age: 76
End: 2021-12-29
Payer: MEDICARE

## 2021-12-29 VITALS
HEIGHT: 62 IN | RESPIRATION RATE: 16 BRPM | DIASTOLIC BLOOD PRESSURE: 66 MMHG | BODY MASS INDEX: 31.28 KG/M2 | SYSTOLIC BLOOD PRESSURE: 117 MMHG | WEIGHT: 170 LBS | HEART RATE: 54 BPM | OXYGEN SATURATION: 97 %

## 2021-12-29 DIAGNOSIS — R60.0 LOWER EXTREMITY EDEMA: ICD-10-CM

## 2021-12-29 DIAGNOSIS — E53.8 VITAMIN B12 DEFICIENCY: ICD-10-CM

## 2021-12-29 DIAGNOSIS — I10 ESSENTIAL HYPERTENSION: ICD-10-CM

## 2021-12-29 DIAGNOSIS — E11.42 TYPE 2 DIABETES MELLITUS WITH DIABETIC POLYNEUROPATHY, WITHOUT LONG-TERM CURRENT USE OF INSULIN (HCC): Primary | ICD-10-CM

## 2021-12-29 LAB
ALT SERPL-CCNC: 11 U/L (ref 10–40)
ANION GAP SERPL CALCULATED.3IONS-SCNC: 13 MMOL/L (ref 3–16)
BUN BLDV-MCNC: 18 MG/DL (ref 7–20)
CALCIUM SERPL-MCNC: 9.7 MG/DL (ref 8.3–10.6)
CHLORIDE BLD-SCNC: 102 MMOL/L (ref 99–110)
CHOLESTEROL, TOTAL: 146 MG/DL (ref 0–199)
CO2: 26 MMOL/L (ref 21–32)
CREAT SERPL-MCNC: 0.9 MG/DL (ref 0.6–1.2)
GFR AFRICAN AMERICAN: >60
GFR NON-AFRICAN AMERICAN: >60
GLUCOSE BLD-MCNC: 123 MG/DL (ref 70–99)
HDLC SERPL-MCNC: 32 MG/DL (ref 40–60)
LDL CHOLESTEROL CALCULATED: 77 MG/DL
POTASSIUM SERPL-SCNC: 4.2 MMOL/L (ref 3.5–5.1)
SODIUM BLD-SCNC: 141 MMOL/L (ref 136–145)
TRIGL SERPL-MCNC: 183 MG/DL (ref 0–150)
VITAMIN B-12: 1095 PG/ML (ref 211–911)
VLDLC SERPL CALC-MCNC: 37 MG/DL

## 2021-12-29 PROCEDURE — 1123F ACP DISCUSS/DSCN MKR DOCD: CPT | Performed by: FAMILY MEDICINE

## 2021-12-29 PROCEDURE — G8482 FLU IMMUNIZE ORDER/ADMIN: HCPCS | Performed by: FAMILY MEDICINE

## 2021-12-29 PROCEDURE — G8399 PT W/DXA RESULTS DOCUMENT: HCPCS | Performed by: FAMILY MEDICINE

## 2021-12-29 PROCEDURE — G0008 ADMIN INFLUENZA VIRUS VAC: HCPCS | Performed by: FAMILY MEDICINE

## 2021-12-29 PROCEDURE — G8427 DOCREV CUR MEDS BY ELIG CLIN: HCPCS | Performed by: FAMILY MEDICINE

## 2021-12-29 PROCEDURE — 3017F COLORECTAL CA SCREEN DOC REV: CPT | Performed by: FAMILY MEDICINE

## 2021-12-29 PROCEDURE — 99214 OFFICE O/P EST MOD 30 MIN: CPT | Performed by: FAMILY MEDICINE

## 2021-12-29 PROCEDURE — 1036F TOBACCO NON-USER: CPT | Performed by: FAMILY MEDICINE

## 2021-12-29 PROCEDURE — G8417 CALC BMI ABV UP PARAM F/U: HCPCS | Performed by: FAMILY MEDICINE

## 2021-12-29 PROCEDURE — 90674 CCIIV4 VAC NO PRSV 0.5 ML IM: CPT | Performed by: FAMILY MEDICINE

## 2021-12-29 PROCEDURE — 4040F PNEUMOC VAC/ADMIN/RCVD: CPT | Performed by: FAMILY MEDICINE

## 2021-12-29 PROCEDURE — 36415 COLL VENOUS BLD VENIPUNCTURE: CPT | Performed by: FAMILY MEDICINE

## 2021-12-29 PROCEDURE — 1090F PRES/ABSN URINE INCON ASSESS: CPT | Performed by: FAMILY MEDICINE

## 2021-12-29 PROCEDURE — 2022F DILAT RTA XM EVC RTNOPTHY: CPT | Performed by: FAMILY MEDICINE

## 2021-12-29 NOTE — PROGRESS NOTES
Vaccine Information Sheet, \"Influenza - Inactivated\"  given to Merna Herndon, or parent/legal guardian of  Merna Herndon and verbalized understanding. Patient responses:    Have you ever had a reaction to a flu vaccine? No  Do you have any current illness? NO  Have you ever had Guillian Lentner Syndrome? No  Do you have a serious allergy to any of the follow: Neomycin, Polymyxin, Thimerosal, eggs or egg products? No    Flu vaccine given per order. Please see immunization tab. Risks and benefits explained. Current VIS given.       Immunizations Administered     Name Date Dose Route    Influenza, MDCK Quadv, IM, PF (Flucelvax 2 yrs and older) 12/29/2021 0.5 mL Intramuscular    Site: Deltoid- Left    Lot: [id number]    [address]: 21812-648-53

## 2021-12-29 NOTE — PROGRESS NOTES
Subjective:  Shruthi Greenberg is here to discuss the following issues. She states her blood sugars at home have been excellent. She is compliant with all medications with no side effects. No polydipsia or polyuria. She has essential hypertension and blood pressures have been very well controlled on medication. She has history of lower extremity edema but Lasix is effective and well-tolerated. She has B12 deficiency continues on her supplement  Social History     Tobacco Use   Smoking Status Former Smoker    Packs/day: 1.00    Years: 20.00    Pack years: 20.00    Quit date: 3/15/2003    Years since quittin.8   Smokeless Tobacco Never Used   Allergies:     Actos [pioglitazone hydrochloride] and Levaquin [levofloxacin]    Objective:  /66 (Site: Left Upper Arm, Position: Sitting, Cuff Size: Medium Adult)   Pulse 54   Resp 16   Ht 5' 2\" (1.575 m)   Wt 170 lb (77.1 kg)   SpO2 97%   BMI 31.09 kg/m²    No acute distress, heart regular rate and rhythm without murmur, lungs clear to auscultation easy effort, abdomen soft nondistended, no clubbing or cyanosis no edema    Assessment:  1. Type 2 diabetes mellitus with diabetic polyneuropathy, without long-term current use of insulin ([address]    2. Essential hypertension    3. Lower extremity edema    4. Vitamin B12 deficiency            Plan:  Labs ordered  Flu shot  Encouraged to get a Covid booster  Continue increased fluid intake  Follow-up in 4 months fasting or. The diagnoses listed in the assessment above are stable unless otherwise indicated. Age-specific preventative health recommendations were reviewed and the [facility]" was provided. Avoid exposure to tobacco products. Follow CDC guidelines for covid-19 prevention. Read and consider all information provided by the pharmacy regarding prescribed medications before use    Call or return for any problems that arise before the next scheduled appointment.         Bradford Hooks MD    This note was transcribed using a voice recognition software system. Proper technique and careful oversight were used to increase transcription accuracy but inadvertent errors may be present.

## 2021-12-30 LAB
ESTIMATED AVERAGE GLUCOSE: 139.9 MG/DL
HBA1C MFR BLD: 6.5 %

## 2022-02-07 ENCOUNTER — VIRTUAL VISIT (OUTPATIENT)
Dept: FAMILY MEDICINE CLINIC | Age: 77
End: 2022-02-07
Payer: MEDICARE

## 2022-02-07 DIAGNOSIS — Z00.00 ROUTINE GENERAL MEDICAL EXAMINATION AT A HEALTH CARE FACILITY: Primary | ICD-10-CM

## 2022-02-07 PROCEDURE — 4040F PNEUMOC VAC/ADMIN/RCVD: CPT | Performed by: FAMILY MEDICINE

## 2022-02-07 PROCEDURE — G0439 PPPS, SUBSEQ VISIT: HCPCS | Performed by: FAMILY MEDICINE

## 2022-02-07 PROCEDURE — 3017F COLORECTAL CA SCREEN DOC REV: CPT | Performed by: FAMILY MEDICINE

## 2022-02-07 PROCEDURE — 1123F ACP DISCUSS/DSCN MKR DOCD: CPT | Performed by: FAMILY MEDICINE

## 2022-02-07 SDOH — ECONOMIC STABILITY: FOOD INSECURITY: WITHIN THE PAST 12 MONTHS, THE FOOD YOU BOUGHT JUST DIDN'T LAST AND YOU DIDN'T HAVE MONEY TO GET MORE.: NEVER TRUE

## 2022-02-07 SDOH — ECONOMIC STABILITY: FOOD INSECURITY: WITHIN THE PAST 12 MONTHS, YOU WORRIED THAT YOUR FOOD WOULD RUN OUT BEFORE YOU GOT MONEY TO BUY MORE.: NEVER TRUE

## 2022-02-07 ASSESSMENT — SOCIAL DETERMINANTS OF HEALTH (SDOH): HOW HARD IS IT FOR YOU TO PAY FOR THE VERY BASICS LIKE FOOD, HOUSING, MEDICAL CARE, AND HEATING?: NOT HARD AT ALL

## 2022-02-07 ASSESSMENT — LIFESTYLE VARIABLES: HOW OFTEN DO YOU HAVE A DRINK CONTAINING ALCOHOL: 0

## 2022-02-07 ASSESSMENT — PATIENT HEALTH QUESTIONNAIRE - PHQ9
2. FEELING DOWN, DEPRESSED OR HOPELESS: 0
SUM OF ALL RESPONSES TO PHQ9 QUESTIONS 1 & 2: 0
SUM OF ALL RESPONSES TO PHQ QUESTIONS 1-9: 0
1. LITTLE INTEREST OR PLEASURE IN DOING THINGS: 0

## 2022-02-07 NOTE — PROGRESS NOTES
Medicare Annual Wellness Visit  Name: Roberta Flowers Date: 2022   MRN: <[id number]> Sex: Female   Age: 77 y.o. Ethnicity: Non- / Non    : 1945 Race: White (non-)      Jeannine Ache is here for Medicare AWV    Screenings for behavioral, psychosocial and functional/safety risks, and cognitive dysfunction are all negative except as indicated below. These results, as well as other patient data from the [address] form, are documented in Flowsheets linked to this Encounter. Allergies   Allergen Reactions    Actos [Pioglitazone Hydrochloride]     Levaquin [Levofloxacin]        Prior to Visit Medications    Medication Sig Taking? Authorizing Provider   metFORMIN (GLUCOPHAGE) 1000 MG tablet TAKE 1 TABLET TWICE A DAY WITH MEALS  Ramin Maruice MD   furosemide (LASIX) 20 MG tablet TAKE 1 TO 2 TABLETS BY MOUTH EVERY DAY AS NEEDED FOR SWELLING  Ramin Maurice MD   lisinopril-hydroCHLOROthiazide (PRINZIDE;ZESTORETIC) 20-12.5 MG per tablet TAKE 1 TABLET TWICE A Darliss MD Talisha   JANUVIA 50 MG tablet TAKE 1 TABLET DAILY  Ramin Maurice MD   atorvastatin (LIPITOR) 40 MG tablet TAKE 1 TABLET DAILY  Ramin Maurice MD   glipiZIDE (GLUCOTROL) 10 MG tablet TAKE 2 TABLETS TWICE A DAY BEFORE MEALS  Ramin Maurice MD   atenolol (TENORMIN) 25 MG tablet TAKE 1 TABLET DAILY  Ramin Maurice MD   cyanocobalamin ([employer] VITAMIN B12) 1000 MCG tablet Take 1 tablet by mouth daily  Ramin Maurice MD   vitamin E 400 UNIT capsule Take 1 capsule by mouth 2 times daily  Patient taking differently: Take 400 Units by mouth daily   Ramin Maurice MD   Vitamin D (CHOLECALCIFEROL) 1000 UNITS CAPS capsule Take 2 capsules by mouth daily  Ramin Maurice MD   Ascorbic Acid (VITAMIN C CR) 500 MG TBCR Take  by mouth. Historical Provider, MD   aspirin 81 MG EC tablet Take 81 mg by mouth daily.   Historical Provider, MD       Past Medical History:   Diagnosis Date    CTS (carpal tunnel syndrome)     DM (diabetes mellitus) ([facility] [address]     foot 4/07, micral 4/07    Elevated LFT's     HTN     Hyperlipidemia     neg gxt 11/00    Lung mass     stable    Peripheral neuropathy     Protein in urine        Past Surgical History:   Procedure Laterality Date    LUMBAR DISC SURGERY      TRE AND BSO         No family history on file. CareTeam (Including outside providers/suppliers regularly involved in providing care):   Patient Care Team:  Al Flores MD as PCP - General (Family Medicine)  Al Flores MD as PCP - [facility] Empaneled Provider  Joaquin Poe MD (General Surgery)    Wt Readings from Last 3 Encounters:   12/29/21 170 lb (77.1 kg)   08/23/21 172 lb (78 kg)   06/15/21 171 lb (77.6 kg)     Patient reported vitals as follows:  /68  Pulse 61  Weight 160 lbs    Based upon direct observation of the patient, evaluation of cognition reveals recent and remote memory intact. Patient's complete Health Risk Assessment and screening values have been reviewed and are found in Flowsheets. The following problems were reviewed today and where indicated follow up appointments were made and/or referrals ordered. Positive Risk Factor Screenings with Interventions:          General Health and ACP:     Advance Directives     Power of  Living Will ACP-Advance Directive ACP-Power of     Not on File Not on File Not on File Not on File      General Health Risk Interventions:  · No Living Will: Patient declines ACP discussion/assistance    Health Habits/Nutrition:        Health Habits/Nutrition Interventions:  · Inadequate physical activity:  Will increase activity again when weather improves.        Personalized Preventive Plan   Current Health Maintenance Status  Immunization History   Administered Date(s) Administered    COVID-19Yesi, Primary or Immunocompromised, PF, 100mcg/0.5mL 02/20/2021, 03/20/2021    Influenza 12/08/2010, 09/19/2012, 09/30/2013    Influenza A (B9W6-80) Vaccine PF IM 11/12/2009    Influenza Virus Vaccine 09/19/2012, 09/30/2013, 12/29/2015    Influenza Whole 10/01/2008    Influenza, MDCK Quadv, IM, PF (Flucelvax 2 yrs and older) 12/29/2021    Influenza, Kayleigh Annamarie, IM, (6 mo and older Fluzone, Flulaval, Fluarix and 3 yrs and older Afluria) 10/26/2016, 10/31/2017, 09/28/2018, 12/20/2019    Influenza, Quadv, adjuvanted, 65 yrs +, IM, PF (Fluad) 10/22/2020    Pneumococcal Conjugate 13-valent (Rfcmtic09) 12/29/2015    Pneumococcal Polysaccharide (Iflproqoe83) 02/01/2005, 09/30/2013    Td, unspecified formulation 01/01/1999    Zoster Recombinant (Shingrix) 11/20/2020        Health Maintenance   Topic Date Due    Hepatitis C screen  Never done    DTaP/Tdap/Td vaccine (1 - Tdap) 01/02/1999    Annual Wellness Visit (AWV)  Never done    Shingles Vaccine (2 of 2) 01/15/2021    COVID-19 Vaccine (3 - Booster for Isidro Gallus series) 08/20/2021    Diabetic retinal exam  03/10/2022    Depression Screen  03/26/2022    Diabetic foot exam  08/23/2022    Colon Cancer Screen FIT/FOBT  08/27/2022    A1C test (Diabetic or Prediabetic)  12/29/2022    Lipid screen  12/29/2022    Potassium monitoring  12/29/2022    Creatinine monitoring  12/29/2022    DEXA (modify frequency per FRAX score)  Completed    Flu vaccine  Completed    Pneumococcal 65+ years Vaccine  Completed    Hepatitis A vaccine  Aged Out    Hib vaccine  Aged Out    Meningococcal (ACWY) vaccine  Aged Out     Recommendations for [employer] Due: see orders and patient instructions/AVS.  . Recommended screening schedule for the next 5-10 years is provided to the patient in written form: see Patient Instructions/AVS.    Ailyn Caputo LPN, 9/6/0909, performed the documented evaluation under the direct supervision of the attending physician. Tree Issa, was evaluated through a synchronous (real-time) telephone encounter.  The patient (or guardian if applicable) is aware that this is a billable service. Verbal consent to proceed has been obtained within the past 12 months. The visit was conducted pursuant to the emergency declaration under the [address] [place] [address] and the [employer] General Act. Patient identification was verified, and a caregiver was present when appropriate. The patient was located in a state where the provider was credentialed to provide care. --Pedro Pabol Claros LPN on 7/0/1279 at 2:68 PM    An electronic signature was used to authenticate this note.

## 2022-02-07 NOTE — PATIENT INSTRUCTIONS
Personalized Preventive Plan for Kristy Rossi - 2/7/2022  Medicare offers a range of preventive health benefits. Some of the tests and screenings are paid in full while other may be subject to a deductible, co-insurance, and/or copay. Some of these benefits include a comprehensive review of your medical history including lifestyle, illnesses that may run in your family, and various assessments and screenings as appropriate. After reviewing your medical record and screening and assessments performed today your provider may have ordered immunizations, labs, imaging, and/or referrals for you. A list of these orders (if applicable) as well as your Preventive Care list are included within your After Visit Summary for your review. Other Preventive Recommendations:    · A preventive eye exam performed by an eye specialist is recommended every 1-2 years to screen for glaucoma; cataracts, macular degeneration, and other eye disorders. · A preventive dental visit is recommended every 6 months. · Try to get at least 150 minutes of exercise per week or 10,000 steps per day on a pedometer . · Order or download the FREE \"Exercise & Physical Activity: Your Everyday Guide\" from [employer]. Call 4-663.944.9094 or search [employer] online. · You need 9286-5832 mg of calcium and 8800-9990 IU of vitamin D per day. It is possible to meet your calcium requirement with diet alone, but a vitamin D supplement is usually necessary to meet this goal.  · When exposed to the sun, use a sunscreen that protects against both UVA and UVB radiation with an SPF of 30 or greater. Reapply every 2 to 3 hours or after sweating, drying off with a towel, or swimming. · Always wear a seat belt when traveling in a car. Always wear a helmet when riding a bicycle or motorcycle.

## 2022-02-17 RX ORDER — ATORVASTATIN CALCIUM 40 MG/1
TABLET, FILM COATED ORAL
Qty: 90 TABLET | Refills: 3 | Status: SHIPPED | OUTPATIENT
Start: 2022-02-17

## 2022-03-25 RX ORDER — SITAGLIPTIN 50 MG/1
TABLET, FILM COATED ORAL
Qty: 90 TABLET | Refills: 1 | Status: SHIPPED | OUTPATIENT
Start: 2022-03-25 | End: 2022-09-22

## 2022-05-02 RX ORDER — ATENOLOL 25 MG/1
TABLET ORAL
Qty: 90 TABLET | Refills: 3 | Status: SHIPPED | OUTPATIENT
Start: 2022-05-02

## 2022-05-13 RX ORDER — FUROSEMIDE 20 MG/1
TABLET ORAL
Qty: 180 TABLET | Refills: 1 | Status: SHIPPED | OUTPATIENT
Start: 2022-05-13 | End: 2022-10-25 | Stop reason: SDUPTHER

## 2022-05-13 NOTE — TELEPHONE ENCOUNTER
Future appt scheduled 05/26/2022          Last appt 12/29/2021      Last Written 11/10/2021    furosemide (LASIX) 20 MG tablet  #180  1 RF

## 2022-06-10 ENCOUNTER — TELEPHONE (OUTPATIENT)
Dept: FAMILY MEDICINE CLINIC | Age: 77
End: 2022-06-10

## 2022-06-10 DIAGNOSIS — Z20.822 SUSPECTED COVID-19 VIRUS INFECTION: Primary | ICD-10-CM

## 2022-06-10 DIAGNOSIS — R05.9 COUGH: ICD-10-CM

## 2022-06-10 NOTE — TELEPHONE ENCOUNTER
called and patient has a cough,sore throat and headache and body aches x 3 days. Sent order for FLu and Covid to [address] [place] [address]

## 2022-06-10 NOTE — TELEPHONE ENCOUNTER
Called and got a verbal result that his Covid is positive. Advised patient. Drink plenty of fluids, get rest, take Mucinex DM, zinc and Vitamin C check oxygen level. If worsens or breathing difficulty go to the ER.

## 2022-06-15 ENCOUNTER — TELEPHONE (OUTPATIENT)
Dept: FAMILY MEDICINE CLINIC | Age: 77
End: 2022-06-15

## 2022-06-15 NOTE — TELEPHONE ENCOUNTER
Patient called and she tested positive for Covid on 6-10-22. She has a cough. She is requesting something for the cough. She is scheduled for an appt on 6-16-22  Can she still come on 6-16-22 ?

## 2022-06-15 NOTE — TELEPHONE ENCOUNTER
Patient was advised to use otc Robitussin. She has an appt tomorrow.  If I cancel it it will probably be August before I can get them  Rescheduled  She is past the 5 days of quarnatine but is still coughing

## 2022-06-20 ENCOUNTER — TELEPHONE (OUTPATIENT)
Dept: FAMILY MEDICINE CLINIC | Age: 77
End: 2022-06-20

## 2022-06-20 RX ORDER — BENZONATATE 100 MG/1
100 CAPSULE ORAL 3 TIMES DAILY PRN
Qty: 30 CAPSULE | Refills: 0 | Status: SHIPPED | OUTPATIENT
Start: 2022-06-20 | End: 2022-07-18

## 2022-06-20 NOTE — TELEPHONE ENCOUNTER
Okay to call in a prescription for Tessalon Perles 100 mg 3 times daily as needed cough. Quantity 20.

## 2022-06-20 NOTE — TELEPHONE ENCOUNTER
Patient tested positive for covid on 6/10. She still has headaches and is coughing a lot. She is using Robitusin and is taking vitamin c and zinc and mucinex. Wanted to see if she could get something else for the cough.

## 2022-06-24 ENCOUNTER — OFFICE VISIT (OUTPATIENT)
Dept: FAMILY MEDICINE CLINIC | Age: 77
End: 2022-06-24
Payer: MEDICARE

## 2022-06-24 VITALS
DIASTOLIC BLOOD PRESSURE: 54 MMHG | TEMPERATURE: 98.4 F | WEIGHT: 166 LBS | SYSTOLIC BLOOD PRESSURE: 154 MMHG | HEART RATE: 55 BPM | OXYGEN SATURATION: 94 % | BODY MASS INDEX: 30.36 KG/M2

## 2022-06-24 DIAGNOSIS — R60.0 LOWER EXTREMITY EDEMA: Primary | ICD-10-CM

## 2022-06-24 DIAGNOSIS — E53.8 VITAMIN B12 DEFICIENCY: ICD-10-CM

## 2022-06-24 DIAGNOSIS — E11.42 TYPE 2 DIABETES MELLITUS WITH DIABETIC POLYNEUROPATHY, WITHOUT LONG-TERM CURRENT USE OF INSULIN (HCC): ICD-10-CM

## 2022-06-24 DIAGNOSIS — I10 ESSENTIAL HYPERTENSION: ICD-10-CM

## 2022-06-24 LAB
ALT SERPL-CCNC: 8 U/L (ref 10–40)
ANION GAP SERPL CALCULATED.3IONS-SCNC: 10 MMOL/L (ref 3–16)
BUN BLDV-MCNC: 10 MG/DL (ref 7–20)
CALCIUM SERPL-MCNC: 9.9 MG/DL (ref 8.3–10.6)
CHLORIDE BLD-SCNC: 100 MMOL/L (ref 99–110)
CHOLESTEROL, TOTAL: 149 MG/DL (ref 0–199)
CO2: 26 MMOL/L (ref 21–32)
CREAT SERPL-MCNC: 0.8 MG/DL (ref 0.6–1.2)
GFR AFRICAN AMERICAN: >60
GFR NON-AFRICAN AMERICAN: >60
GLUCOSE BLD-MCNC: 132 MG/DL (ref 70–99)
HDLC SERPL-MCNC: 32 MG/DL (ref 40–60)
LDL CHOLESTEROL CALCULATED: 70 MG/DL
POTASSIUM SERPL-SCNC: 4.7 MMOL/L (ref 3.5–5.1)
SODIUM BLD-SCNC: 136 MMOL/L (ref 136–145)
TRIGL SERPL-MCNC: 233 MG/DL (ref 0–150)
VITAMIN B-12: 987 PG/ML (ref 211–911)
VLDLC SERPL CALC-MCNC: 47 MG/DL

## 2022-06-24 PROCEDURE — 1123F ACP DISCUSS/DSCN MKR DOCD: CPT | Performed by: FAMILY MEDICINE

## 2022-06-24 PROCEDURE — 1036F TOBACCO NON-USER: CPT | Performed by: FAMILY MEDICINE

## 2022-06-24 PROCEDURE — 2022F DILAT RTA XM EVC RTNOPTHY: CPT | Performed by: FAMILY MEDICINE

## 2022-06-24 PROCEDURE — 99214 OFFICE O/P EST MOD 30 MIN: CPT | Performed by: FAMILY MEDICINE

## 2022-06-24 PROCEDURE — 3017F COLORECTAL CA SCREEN DOC REV: CPT | Performed by: FAMILY MEDICINE

## 2022-06-24 PROCEDURE — G8399 PT W/DXA RESULTS DOCUMENT: HCPCS | Performed by: FAMILY MEDICINE

## 2022-06-24 PROCEDURE — 3046F HEMOGLOBIN A1C LEVEL >9.0%: CPT | Performed by: FAMILY MEDICINE

## 2022-06-24 PROCEDURE — 1090F PRES/ABSN URINE INCON ASSESS: CPT | Performed by: FAMILY MEDICINE

## 2022-06-24 PROCEDURE — G8427 DOCREV CUR MEDS BY ELIG CLIN: HCPCS | Performed by: FAMILY MEDICINE

## 2022-06-24 PROCEDURE — G8417 CALC BMI ABV UP PARAM F/U: HCPCS | Performed by: FAMILY MEDICINE

## 2022-06-24 PROCEDURE — 36415 COLL VENOUS BLD VENIPUNCTURE: CPT | Performed by: FAMILY MEDICINE

## 2022-06-24 NOTE — PATIENT INSTRUCTIONS
Please read the healthy family handout that you were given and share it with your family. Please compare this printed medication list with your medications at home to be sure they are the same. If you have any medications that are different please contact us immediately at [phone]. Also review your allergies that we have listed, these may also include medications that you have not been able to tolerate, make sure everything listed is correct. If you have any allergies that are different please contact us immediately at [phone].

## 2022-06-25 LAB
ESTIMATED AVERAGE GLUCOSE: 145.6 MG/DL
HBA1C MFR BLD: 6.7 %

## 2022-06-27 ENCOUNTER — TELEPHONE (OUTPATIENT)
Dept: FAMILY MEDICINE CLINIC | Age: 77
End: 2022-06-27

## 2022-06-27 RX ORDER — DOXYCYCLINE HYCLATE 100 MG
100 TABLET ORAL 2 TIMES DAILY
Qty: 20 TABLET | Refills: 0 | Status: SHIPPED | OUTPATIENT
Start: 2022-06-27 | End: 2022-07-07

## 2022-07-18 ENCOUNTER — OFFICE VISIT (OUTPATIENT)
Dept: FAMILY MEDICINE CLINIC | Age: 77
End: 2022-07-18
Payer: MEDICARE

## 2022-07-18 VITALS
DIASTOLIC BLOOD PRESSURE: 76 MMHG | SYSTOLIC BLOOD PRESSURE: 132 MMHG | BODY MASS INDEX: 30.07 KG/M2 | WEIGHT: 164.4 LBS | OXYGEN SATURATION: 95 % | HEART RATE: 59 BPM | TEMPERATURE: 98.2 F

## 2022-07-18 DIAGNOSIS — H66.003 NON-RECURRENT ACUTE SUPPURATIVE OTITIS MEDIA OF BOTH EARS WITHOUT SPONTANEOUS RUPTURE OF TYMPANIC MEMBRANES: ICD-10-CM

## 2022-07-18 DIAGNOSIS — R09.1 PLEURITIS: Primary | ICD-10-CM

## 2022-07-18 PROCEDURE — 1123F ACP DISCUSS/DSCN MKR DOCD: CPT | Performed by: NURSE PRACTITIONER

## 2022-07-18 PROCEDURE — G8427 DOCREV CUR MEDS BY ELIG CLIN: HCPCS | Performed by: NURSE PRACTITIONER

## 2022-07-18 PROCEDURE — 3017F COLORECTAL CA SCREEN DOC REV: CPT | Performed by: NURSE PRACTITIONER

## 2022-07-18 PROCEDURE — 1036F TOBACCO NON-USER: CPT | Performed by: NURSE PRACTITIONER

## 2022-07-18 PROCEDURE — 99213 OFFICE O/P EST LOW 20 MIN: CPT | Performed by: NURSE PRACTITIONER

## 2022-07-18 PROCEDURE — G8417 CALC BMI ABV UP PARAM F/U: HCPCS | Performed by: NURSE PRACTITIONER

## 2022-07-18 PROCEDURE — 1090F PRES/ABSN URINE INCON ASSESS: CPT | Performed by: NURSE PRACTITIONER

## 2022-07-18 PROCEDURE — G8399 PT W/DXA RESULTS DOCUMENT: HCPCS | Performed by: NURSE PRACTITIONER

## 2022-07-18 RX ORDER — PREDNISONE 20 MG/1
20 TABLET ORAL DAILY
Qty: 7 TABLET | Refills: 0 | Status: SHIPPED | OUTPATIENT
Start: 2022-07-18 | End: 2022-07-25

## 2022-07-18 RX ORDER — AMOXICILLIN 500 MG/1
500 CAPSULE ORAL 3 TIMES DAILY
Qty: 30 CAPSULE | Refills: 0 | Status: SHIPPED | OUTPATIENT
Start: 2022-07-18 | End: 2022-07-28

## 2022-07-18 ASSESSMENT — ENCOUNTER SYMPTOMS
EYES NEGATIVE: 1
COUGH: 1
CHEST TIGHTNESS: 1

## 2022-07-18 NOTE — PATIENT INSTRUCTIONS
Please read the healthy family handout that you were given and share it with your family. Please compare this printed medication list with your medications at home to be sure they are the same. If you have any medications that are different please contact us immediately at [phone]. Also review your allergies that we have listed, these may also include medications that you have not been able to tolerate, make sure everything listed is correct. If you have any allergies that are different please contact us immediately at [phone]. You may receive a survey in the mail or by email asking about your experience during your visit today. Please complete and return to us so we know how we are serving you.

## 2022-07-18 NOTE — PROGRESS NOTES
Subjective:      Patient ID: Juev Jimenez is a 77 y.o. female. Chief Complaint   Patient presents with    Other     Possible pleurisy        Other  Associated symptoms include congestion and coughing. Pertinent negatives include no chills or fever. Patient presents today with concerns of possible pleurisy. Patient reports she had COVID in June followed by sinusitis in which she was treated with an antibiotic. Patient reports she's had some chest congestion since she had COVID last month. Patient reports she does have an occasional cough which has increased today. Patient reports cough is non-productive. Patient reports the chest wall pain is worse with movement. Patient states she has taken mucinex and ibuprofen which did improve symptoms. Patient denies fevers or chills however with complaints of bilateral ear pain, left greater than right. Review of Systems   Constitutional:  Negative for appetite change, chills and fever. HENT:  Positive for congestion and ear pain. Eyes: Negative. Respiratory:  Positive for cough and chest tightness. Endocrine: Negative. Genitourinary: Negative.       Patient Active Problem List   Diagnosis    Protein in urine    CTS (carpal tunnel syndrome)    Lung mass    Vitamin D deficiency    Non-rheumatic mitral regurgitation    Elevated LFTs    Osteopenia    Type 2 diabetes mellitus with diabetic polyneuropathy, without long-term current use of insulin (HCC)    Essential hypertension    Hypercholesteremia    Cervical spine disease    Gastroesophageal reflux disease without esophagitis    Lower extremity edema    Arthritis    Vitamin B12 deficiency    Idiopathic progressive neuropathy       Outpatient Medications Marked as Taking for the 7/18/22 encounter (Office Visit) with NEYMAR Jackson CNP   Medication Sig Dispense Refill    furosemide (LASIX) 20 MG tablet TAKE 1 TO 2 TABLETS BY MOUTH EVERY DAY AS NEEDED FOR SWELLING 180 tablet 1    atenolol (TENORMIN) 25 MG tablet TAKE 1 TABLET DAILY 90 tablet 3    JANUVIA 50 MG tablet TAKE 1 TABLET DAILY 90 tablet 1    atorvastatin (LIPITOR) 40 MG tablet TAKE 1 TABLET DAILY 90 tablet 3    metFORMIN (GLUCOPHAGE) 1000 MG tablet TAKE 1 TABLET TWICE A DAY WITH MEALS 180 tablet 3    lisinopril-hydroCHLOROthiazide (PRINZIDE;ZESTORETIC) 20-12.5 MG per tablet TAKE 1 TABLET TWICE A  tablet 3    glipiZIDE (GLUCOTROL) 10 MG tablet TAKE 2 TABLETS TWICE A DAY BEFORE MEALS (Patient taking differently: 5 mg  in the morning and 5 mg in the evening. Take before meals. TAKE 2 TABLETS TWICE A DAY BEFORE MEALS.) 360 tablet 3    cyanocobalamin ([employer] VITAMIN B12) 1000 MCG tablet Take 1 tablet by mouth daily 30 tablet 3    vitamin E 400 UNIT capsule Take 1 capsule by mouth 2 times daily (Patient taking differently: Take 400 Units by mouth in the morning.) 30 capsule 3    Vitamin D (CHOLECALCIFEROL) 1000 UNITS CAPS capsule Take 2 capsules by mouth daily 1 capsule 0    Ascorbic Acid (VITAMIN C CR) 500 MG TBCR Take  by mouth. aspirin 81 MG EC tablet Take 81 mg by mouth daily. Allergies   Allergen Reactions    Actos [Pioglitazone Hydrochloride]     Levaquin [Levofloxacin]        Social History     Tobacco Use    Smoking status: Former     Packs/day: 1.00     Years: 20.00     Pack years: 20.00     Types: Cigarettes     Quit date: 3/15/2003     Years since quittin.3    Smokeless tobacco: Never   Substance Use Topics    Alcohol use: No       Objective:   /76   Pulse 59   Temp 98.2 °F (36.8 °C) (Oral)   Wt 164 lb 6.4 oz (74.6 kg)   SpO2 95%   BMI 30.07 kg/m²     Physical Exam  Vitals and nursing note reviewed. Constitutional:       General: She is not in acute distress. Appearance: Normal appearance. She is well-developed and well-groomed. HENT:      Head: Normocephalic and atraumatic. Right Ear: Tympanic membrane is erythematous. Left Ear: Tympanic membrane is erythematous and bulging.    Eyes: (AMOXIL) 500 MG capsule; Take 1 capsule by mouth in the morning and 1 capsule at noon and 1 capsule before bedtime. Do all this for 10 days. Dispense: 30 capsule;  Refill: 0

## 2022-09-22 RX ORDER — SITAGLIPTIN 50 MG/1
TABLET, FILM COATED ORAL
Qty: 90 TABLET | Refills: 1 | Status: SHIPPED | OUTPATIENT
Start: 2022-09-22

## 2022-10-25 ENCOUNTER — OFFICE VISIT (OUTPATIENT)
Dept: FAMILY MEDICINE CLINIC | Age: 77
End: 2022-10-25
Payer: MEDICARE

## 2022-10-25 VITALS
OXYGEN SATURATION: 92 % | SYSTOLIC BLOOD PRESSURE: 115 MMHG | WEIGHT: 163 LBS | BODY MASS INDEX: 29.81 KG/M2 | DIASTOLIC BLOOD PRESSURE: 61 MMHG | HEART RATE: 56 BPM

## 2022-10-25 DIAGNOSIS — I10 ESSENTIAL HYPERTENSION: Primary | ICD-10-CM

## 2022-10-25 DIAGNOSIS — Z12.31 SCREENING MAMMOGRAM, ENCOUNTER FOR: ICD-10-CM

## 2022-10-25 DIAGNOSIS — I34.0 NON-RHEUMATIC MITRAL REGURGITATION: ICD-10-CM

## 2022-10-25 DIAGNOSIS — E11.42 TYPE 2 DIABETES MELLITUS WITH DIABETIC POLYNEUROPATHY, WITHOUT LONG-TERM CURRENT USE OF INSULIN (HCC): ICD-10-CM

## 2022-10-25 DIAGNOSIS — E55.9 VITAMIN D DEFICIENCY: ICD-10-CM

## 2022-10-25 DIAGNOSIS — E53.8 VITAMIN B12 DEFICIENCY: ICD-10-CM

## 2022-10-25 DIAGNOSIS — G56.03 BILATERAL CARPAL TUNNEL SYNDROME: ICD-10-CM

## 2022-10-25 DIAGNOSIS — R60.0 LOWER EXTREMITY EDEMA: ICD-10-CM

## 2022-10-25 DIAGNOSIS — M48.9 CERVICAL SPINE DISEASE: ICD-10-CM

## 2022-10-25 LAB
ALT SERPL-CCNC: 10 U/L (ref 10–40)
ANION GAP SERPL CALCULATED.3IONS-SCNC: 12 MMOL/L (ref 3–16)
BUN BLDV-MCNC: 23 MG/DL (ref 7–20)
CALCIUM SERPL-MCNC: 9.8 MG/DL (ref 8.3–10.6)
CHLORIDE BLD-SCNC: 99 MMOL/L (ref 99–110)
CHOLESTEROL, TOTAL: 166 MG/DL (ref 0–199)
CO2: 27 MMOL/L (ref 21–32)
CREAT SERPL-MCNC: 1.1 MG/DL (ref 0.6–1.2)
GFR SERPL CREATININE-BSD FRML MDRD: 52 ML/MIN/{1.73_M2}
GLUCOSE BLD-MCNC: 115 MG/DL (ref 70–99)
HDLC SERPL-MCNC: 37 MG/DL (ref 40–60)
LDL CHOLESTEROL CALCULATED: 101 MG/DL
POTASSIUM SERPL-SCNC: 4.1 MMOL/L (ref 3.5–5.1)
SODIUM BLD-SCNC: 138 MMOL/L (ref 136–145)
TRIGL SERPL-MCNC: 141 MG/DL (ref 0–150)
TSH REFLEX: 3.34 UIU/ML (ref 0.27–4.2)
VITAMIN B-12: 974 PG/ML (ref 211–911)
VITAMIN D 25-HYDROXY: 67.4 NG/ML
VLDLC SERPL CALC-MCNC: 28 MG/DL

## 2022-10-25 PROCEDURE — 1036F TOBACCO NON-USER: CPT | Performed by: FAMILY MEDICINE

## 2022-10-25 PROCEDURE — G8417 CALC BMI ABV UP PARAM F/U: HCPCS | Performed by: FAMILY MEDICINE

## 2022-10-25 PROCEDURE — 1090F PRES/ABSN URINE INCON ASSESS: CPT | Performed by: FAMILY MEDICINE

## 2022-10-25 PROCEDURE — 90694 VACC AIIV4 NO PRSRV 0.5ML IM: CPT | Performed by: FAMILY MEDICINE

## 2022-10-25 PROCEDURE — G8399 PT W/DXA RESULTS DOCUMENT: HCPCS | Performed by: FAMILY MEDICINE

## 2022-10-25 PROCEDURE — 1123F ACP DISCUSS/DSCN MKR DOCD: CPT | Performed by: FAMILY MEDICINE

## 2022-10-25 PROCEDURE — 99214 OFFICE O/P EST MOD 30 MIN: CPT | Performed by: FAMILY MEDICINE

## 2022-10-25 PROCEDURE — G0008 ADMIN INFLUENZA VIRUS VAC: HCPCS | Performed by: FAMILY MEDICINE

## 2022-10-25 PROCEDURE — 3044F HG A1C LEVEL LT 7.0%: CPT | Performed by: FAMILY MEDICINE

## 2022-10-25 PROCEDURE — G8484 FLU IMMUNIZE NO ADMIN: HCPCS | Performed by: FAMILY MEDICINE

## 2022-10-25 PROCEDURE — 36415 COLL VENOUS BLD VENIPUNCTURE: CPT | Performed by: FAMILY MEDICINE

## 2022-10-25 PROCEDURE — G8427 DOCREV CUR MEDS BY ELIG CLIN: HCPCS | Performed by: FAMILY MEDICINE

## 2022-10-25 RX ORDER — FUROSEMIDE 20 MG/1
TABLET ORAL
Qty: 270 TABLET | Refills: 1 | Status: SHIPPED | OUTPATIENT
Start: 2022-10-25

## 2022-10-25 NOTE — PROGRESS NOTES
Subjective:  Monisha Dolan is here to discuss the following issues. She has hypertension and her blood pressures have been excellent on her medication. She has lower extremity edema and history of mitral regurgitation. Her swelling has been worsening slightly. No orthopnea no chest pain or pressure no lightheadedness no nausea or vomiting no syncope. She has diabetes and her blood sugars have been very good on medication with no polydipsia or polyuria. She has a history of carpal tunnel syndrome and cervical spine disease and has occasional nighttime numbness and tingling in her arms and hands. She is unsure of the specific locations. She has B12 and D deficiency and takes her supplements daily. She requests a mammogram.  Social History     Tobacco Use   Smoking Status Former    Packs/day: 1.00    Years: 20.00    Pack years: 20.00    Types: Cigarettes    Quit date: 3/15/2003    Years since quittin.6   Smokeless Tobacco Never   Allergies:     Actos [pioglitazone hydrochloride] and Levaquin [levofloxacin]    Objective:  /61   Pulse 56   Wt 163 lb (73.9 kg)   SpO2 92%   BMI 29.81 kg/m²    No acute distress, heart regular rate and rhythm with increased mitral valve murmur, lungs clear to auscultation easy effort, abdomen soft nondistended, no clubbing or cyanosis minimal lower extremity edema    Assessment:  1. Essential hypertension    2. Lower extremity edema    3. Non-rheumatic mitral regurgitation    4. Type 2 diabetes mellitus with diabetic polyneuropathy, without long-term current use of insulin ([address]    5. Bilateral carpal tunnel syndrome    6. Cervical spine disease    7. Vitamin B12 deficiency    8. Vitamin D deficiency    9.  Screening mammogram, encounter for            Plan:  Echocardiogram  Mammogram  She will advise me if her fifth digit is involved in her hand and forearm numbness and tingling at night  Offered cervical spine x-ray and she declines  She is taking Lasix 40 mg daily and may take an extra tablet as needed  Flu shot  Labs ordered  Continue current medicines  Follow-up in 4 months fasting or as needed  The diagnoses listed in the assessment above are stable unless otherwise indicated. Age-specific preventative health recommendations were reviewed and a \"Healthy Family Handout\" has been provided. Avoid exposure to tobacco products. Follow COVID-19 CDC guidelines. Read and consider all information provided by the pharmacy regarding prescribed medications before use. Call or return for any problems that arise before the next scheduled appointment. Maria Turner MD    This note was transcribed using a voice recognition software system. Proper technique and careful oversight were used to increase transcription accuracy but inadvertent errors may be present.

## 2022-10-25 NOTE — PATIENT INSTRUCTIONS
Please read the healthy family handout that you were given and share it with your family. Please compare this printed medication list with your medications at home to be sure they are the same. If you have any medications that are different please contact us immediately at [phone]. Also review your allergies that we have listed, these may also include medications that you have not been able to tolerate, make sure everything listed is correct. If you have any allergies that are different please contact us immediately at [phone]. You may receive a survey in the mail or by email asking about your experience during your visit today. Please complete and return to us so we know how we are serving you.

## 2022-10-26 LAB
ESTIMATED AVERAGE GLUCOSE: 134.1 MG/DL
HBA1C MFR BLD: 6.3 %

## 2022-11-10 ENCOUNTER — HOSPITAL ENCOUNTER (OUTPATIENT)
Dept: MAMMOGRAPHY | Age: 77
Discharge: HOME | End: 2022-11-10
Payer: MEDICARE

## 2022-11-10 ENCOUNTER — HOSPITAL ENCOUNTER (OUTPATIENT)
Dept: NON INVASIVE DIAGNOSTICS | Age: 77
Discharge: HOME | End: 2022-11-10
Payer: MEDICARE

## 2022-11-10 DIAGNOSIS — Z12.31 SCREENING MAMMOGRAM, ENCOUNTER FOR: ICD-10-CM

## 2022-11-10 DIAGNOSIS — R60.0 LOWER EXTREMITY EDEMA: ICD-10-CM

## 2022-11-10 DIAGNOSIS — I34.0 NON-RHEUMATIC MITRAL REGURGITATION: ICD-10-CM

## 2022-11-10 LAB
LV EF: 65 %
LVEF MODALITY: NORMAL

## 2022-11-10 PROCEDURE — 77067 SCR MAMMO BI INCL CAD: CPT

## 2022-11-10 PROCEDURE — 93306 TTE W/DOPPLER COMPLETE: CPT

## 2022-11-11 DIAGNOSIS — I34.0 NON-RHEUMATIC MITRAL REGURGITATION: Primary | ICD-10-CM

## 2022-11-11 DIAGNOSIS — R93.1 ABNORMAL ECHOCARDIOGRAM: ICD-10-CM

## 2022-11-11 PROBLEM — I51.7 LVH (LEFT VENTRICULAR HYPERTROPHY): Status: ACTIVE | Noted: 2022-11-11

## 2022-11-14 ENCOUNTER — TELEPHONE (OUTPATIENT)
Dept: MAMMOGRAPHY | Age: 77
End: 2022-11-14

## 2022-11-14 RX ORDER — FUROSEMIDE 20 MG/1
TABLET ORAL
Qty: 180 TABLET | Refills: 1 | OUTPATIENT
Start: 2022-11-14

## 2022-11-15 NOTE — TELEPHONE ENCOUNTER
Future appt scheduled 03/06/2023                 Last appt 10/25/2022      Last Written 11/22/2021    metFORMIN (GLUCOPHAGE) 1000 MG tablet  #180  3 RF

## 2022-12-03 ENCOUNTER — HOSPITAL ENCOUNTER (INPATIENT)
Age: 77
LOS: 2 days | Discharge: HOME | DRG: 308 | End: 2022-12-05
Attending: EMERGENCY MEDICINE | Admitting: INTERNAL MEDICINE
Payer: MEDICARE

## 2022-12-03 ENCOUNTER — APPOINTMENT (OUTPATIENT)
Dept: GENERAL RADIOLOGY | Age: 77
DRG: 308 | End: 2022-12-03
Payer: MEDICARE

## 2022-12-03 DIAGNOSIS — R07.89 CHEST DISCOMFORT: ICD-10-CM

## 2022-12-03 DIAGNOSIS — I48.91 ATRIAL FIBRILLATION WITH RVR (HCC): Primary | ICD-10-CM

## 2022-12-03 DIAGNOSIS — R06.02 SHORTNESS OF BREATH: ICD-10-CM

## 2022-12-03 DIAGNOSIS — R79.89 ELEVATED TSH: ICD-10-CM

## 2022-12-03 LAB
A/G RATIO: 2 (ref 1.1–2.2)
ALBUMIN SERPL-MCNC: 4.5 G/DL (ref 3.4–5)
ALP BLD-CCNC: 57 U/L (ref 40–129)
ALT SERPL-CCNC: 11 U/L (ref 10–40)
ANION GAP SERPL CALCULATED.3IONS-SCNC: 12 MMOL/L (ref 3–16)
AST SERPL-CCNC: 14 U/L (ref 15–37)
BASOPHILS ABSOLUTE: 0.1 K/UL (ref 0–0.2)
BASOPHILS RELATIVE PERCENT: 0.4 %
BILIRUB SERPL-MCNC: 0.4 MG/DL (ref 0–1)
BUN BLDV-MCNC: 20 MG/DL (ref 7–20)
CALCIUM SERPL-MCNC: 10.3 MG/DL (ref 8.3–10.6)
CHLORIDE BLD-SCNC: 98 MMOL/L (ref 99–110)
CO2: 25 MMOL/L (ref 21–32)
CREAT SERPL-MCNC: 1.1 MG/DL (ref 0.6–1.2)
EKG ATRIAL RATE: 136 BPM
EKG DIAGNOSIS: NORMAL
EKG Q-T INTERVAL: 328 MS
EKG QRS DURATION: 96 MS
EKG QTC CALCULATION (BAZETT): 473 MS
EKG R AXIS: -18 DEGREES
EKG T AXIS: 135 DEGREES
EKG VENTRICULAR RATE: 125 BPM
EOSINOPHILS ABSOLUTE: 0.1 K/UL (ref 0–0.6)
EOSINOPHILS RELATIVE PERCENT: 0.6 %
GFR SERPL CREATININE-BSD FRML MDRD: 52 ML/MIN/{1.73_M2}
GLUCOSE BLD-MCNC: 125 MG/DL (ref 70–99)
GLUCOSE BLD-MCNC: 205 MG/DL (ref 70–99)
GLUCOSE BLD-MCNC: 225 MG/DL (ref 70–99)
GLUCOSE BLD-MCNC: 229 MG/DL (ref 70–99)
HCT VFR BLD CALC: 35.8 % (ref 36–48)
HEMOGLOBIN: 12.2 G/DL (ref 12–16)
INFLUENZA A: NOT DETECTED
INFLUENZA B: NOT DETECTED
LYMPHOCYTES ABSOLUTE: 1.7 K/UL (ref 1–5.1)
LYMPHOCYTES RELATIVE PERCENT: 13.4 %
MCH RBC QN AUTO: 30.7 PG (ref 26–34)
MCHC RBC AUTO-ENTMCNC: 34 G/DL (ref 31–36)
MCV RBC AUTO: 90.4 FL (ref 80–100)
MONOCYTES ABSOLUTE: 0.7 K/UL (ref 0–1.3)
MONOCYTES RELATIVE PERCENT: 5.4 %
NEUTROPHILS ABSOLUTE: 10.3 K/UL (ref 1.7–7.7)
NEUTROPHILS RELATIVE PERCENT: 80.2 %
PDW BLD-RTO: 13.9 % (ref 12.4–15.4)
PERFORMED ON: ABNORMAL
PLATELET # BLD: 274 K/UL (ref 135–450)
PMV BLD AUTO: 9.1 FL (ref 5–10.5)
POTASSIUM REFLEX MAGNESIUM: 4.4 MMOL/L (ref 3.5–5.1)
PRO-BNP: 2923 PG/ML (ref 0–449)
RBC # BLD: 3.96 M/UL (ref 4–5.2)
SARS-COV-2 RNA, RT PCR: NOT DETECTED
SODIUM BLD-SCNC: 135 MMOL/L (ref 136–145)
T4 FREE: 1.1 NG/DL (ref 0.9–1.8)
TOTAL PROTEIN: 6.8 G/DL (ref 6.4–8.2)
TROPONIN: 0.23 NG/ML
TROPONIN: <0.01 NG/ML
TSH REFLEX: 10.05 UIU/ML (ref 0.27–4.2)
WBC # BLD: 12.8 K/UL (ref 4–11)

## 2022-12-03 PROCEDURE — 83880 ASSAY OF NATRIURETIC PEPTIDE: CPT

## 2022-12-03 PROCEDURE — 6360000002 HC RX W HCPCS: Performed by: INTERNAL MEDICINE

## 2022-12-03 PROCEDURE — 6370000000 HC RX 637 (ALT 250 FOR IP)

## 2022-12-03 PROCEDURE — 2580000003 HC RX 258

## 2022-12-03 PROCEDURE — 36415 COLL VENOUS BLD VENIPUNCTURE: CPT

## 2022-12-03 PROCEDURE — 84484 ASSAY OF TROPONIN QUANT: CPT

## 2022-12-03 PROCEDURE — 80053 COMPREHEN METABOLIC PANEL: CPT

## 2022-12-03 PROCEDURE — 2060000000 HC ICU INTERMEDIATE R&B

## 2022-12-03 PROCEDURE — 84443 ASSAY THYROID STIM HORMONE: CPT

## 2022-12-03 PROCEDURE — 93005 ELECTROCARDIOGRAM TRACING: CPT | Performed by: EMERGENCY MEDICINE

## 2022-12-03 PROCEDURE — 2580000003 HC RX 258: Performed by: EMERGENCY MEDICINE

## 2022-12-03 PROCEDURE — 84439 ASSAY OF FREE THYROXINE: CPT

## 2022-12-03 PROCEDURE — 87636 SARSCOV2 & INF A&B AMP PRB: CPT

## 2022-12-03 PROCEDURE — 99285 EMERGENCY DEPT VISIT HI MDM: CPT

## 2022-12-03 PROCEDURE — 93010 ELECTROCARDIOGRAM REPORT: CPT | Performed by: INTERNAL MEDICINE

## 2022-12-03 PROCEDURE — 85025 COMPLETE CBC W/AUTO DIFF WBC: CPT

## 2022-12-03 PROCEDURE — 2500000003 HC RX 250 WO HCPCS: Performed by: EMERGENCY MEDICINE

## 2022-12-03 PROCEDURE — 71045 X-RAY EXAM CHEST 1 VIEW: CPT

## 2022-12-03 PROCEDURE — 6360000002 HC RX W HCPCS: Performed by: EMERGENCY MEDICINE

## 2022-12-03 RX ORDER — DILTIAZEM HYDROCHLORIDE 5 MG/ML
10 INJECTION INTRAVENOUS ONCE
Status: COMPLETED | OUTPATIENT
Start: 2022-12-03 | End: 2022-12-03

## 2022-12-03 RX ORDER — ASPIRIN 81 MG/1
81 TABLET ORAL DAILY
Status: DISCONTINUED | OUTPATIENT
Start: 2022-12-03 | End: 2022-12-05 | Stop reason: HOSPADM

## 2022-12-03 RX ORDER — ATORVASTATIN CALCIUM 40 MG/1
40 TABLET, FILM COATED ORAL DAILY
Status: DISCONTINUED | OUTPATIENT
Start: 2022-12-03 | End: 2022-12-05 | Stop reason: HOSPADM

## 2022-12-03 RX ORDER — DEXTROSE MONOHYDRATE 100 MG/ML
INJECTION, SOLUTION INTRAVENOUS CONTINUOUS PRN
Status: DISCONTINUED | OUTPATIENT
Start: 2022-12-03 | End: 2022-12-05 | Stop reason: HOSPADM

## 2022-12-03 RX ORDER — DIGOXIN 0.25 MG/ML
250 INJECTION INTRAMUSCULAR; INTRAVENOUS ONCE
Status: COMPLETED | OUTPATIENT
Start: 2022-12-03 | End: 2022-12-03

## 2022-12-03 RX ORDER — SODIUM CHLORIDE 0.9 % (FLUSH) 0.9 %
5-40 SYRINGE (ML) INJECTION EVERY 12 HOURS SCHEDULED
Status: DISCONTINUED | OUTPATIENT
Start: 2022-12-03 | End: 2022-12-05 | Stop reason: HOSPADM

## 2022-12-03 RX ORDER — DIGOXIN 125 MCG
250 TABLET ORAL DAILY
Status: DISCONTINUED | OUTPATIENT
Start: 2022-12-04 | End: 2022-12-04

## 2022-12-03 RX ORDER — POLYETHYLENE GLYCOL 3350 17 G/17G
17 POWDER, FOR SOLUTION ORAL DAILY PRN
Status: DISCONTINUED | OUTPATIENT
Start: 2022-12-03 | End: 2022-12-05 | Stop reason: HOSPADM

## 2022-12-03 RX ORDER — SODIUM CHLORIDE 9 MG/ML
INJECTION, SOLUTION INTRAVENOUS PRN
Status: DISCONTINUED | OUTPATIENT
Start: 2022-12-03 | End: 2022-12-05 | Stop reason: HOSPADM

## 2022-12-03 RX ORDER — DIGOXIN 125 MCG
250 TABLET ORAL ONCE
Status: DISCONTINUED | OUTPATIENT
Start: 2022-12-03 | End: 2022-12-03

## 2022-12-03 RX ORDER — ONDANSETRON 2 MG/ML
4 INJECTION INTRAMUSCULAR; INTRAVENOUS EVERY 6 HOURS PRN
Status: DISCONTINUED | OUTPATIENT
Start: 2022-12-03 | End: 2022-12-05 | Stop reason: HOSPADM

## 2022-12-03 RX ORDER — ACETAMINOPHEN 325 MG/1
650 TABLET ORAL EVERY 6 HOURS PRN
Status: DISCONTINUED | OUTPATIENT
Start: 2022-12-03 | End: 2022-12-05 | Stop reason: HOSPADM

## 2022-12-03 RX ORDER — ENOXAPARIN SODIUM 100 MG/ML
1 INJECTION SUBCUTANEOUS ONCE
Status: COMPLETED | OUTPATIENT
Start: 2022-12-03 | End: 2022-12-03

## 2022-12-03 RX ORDER — INSULIN LISPRO 100 [IU]/ML
0-4 INJECTION, SOLUTION INTRAVENOUS; SUBCUTANEOUS NIGHTLY
Status: DISCONTINUED | OUTPATIENT
Start: 2022-12-03 | End: 2022-12-05 | Stop reason: HOSPADM

## 2022-12-03 RX ORDER — INSULIN LISPRO 100 [IU]/ML
0-4 INJECTION, SOLUTION INTRAVENOUS; SUBCUTANEOUS
Status: DISCONTINUED | OUTPATIENT
Start: 2022-12-03 | End: 2022-12-05 | Stop reason: HOSPADM

## 2022-12-03 RX ORDER — ACETAMINOPHEN 650 MG/1
650 SUPPOSITORY RECTAL EVERY 6 HOURS PRN
Status: DISCONTINUED | OUTPATIENT
Start: 2022-12-03 | End: 2022-12-05 | Stop reason: HOSPADM

## 2022-12-03 RX ORDER — ONDANSETRON 4 MG/1
4 TABLET, ORALLY DISINTEGRATING ORAL EVERY 8 HOURS PRN
Status: DISCONTINUED | OUTPATIENT
Start: 2022-12-03 | End: 2022-12-05 | Stop reason: HOSPADM

## 2022-12-03 RX ORDER — SODIUM CHLORIDE 0.9 % (FLUSH) 0.9 %
10 SYRINGE (ML) INJECTION PRN
Status: DISCONTINUED | OUTPATIENT
Start: 2022-12-03 | End: 2022-12-05 | Stop reason: HOSPADM

## 2022-12-03 RX ORDER — ENOXAPARIN SODIUM 100 MG/ML
1 INJECTION SUBCUTANEOUS 2 TIMES DAILY
Status: DISCONTINUED | OUTPATIENT
Start: 2022-12-03 | End: 2022-12-05 | Stop reason: HOSPADM

## 2022-12-03 RX ADMIN — DILTIAZEM HYDROCHLORIDE 5 MG/HR: 5 INJECTION, SOLUTION INTRAVENOUS at 10:33

## 2022-12-03 RX ADMIN — DIGOXIN 250 MCG: 0.25 INJECTION INTRAMUSCULAR; INTRAVENOUS at 23:07

## 2022-12-03 RX ADMIN — Medication 10 ML: at 20:08

## 2022-12-03 RX ADMIN — ENOXAPARIN SODIUM 70 MG: 100 INJECTION SUBCUTANEOUS at 20:08

## 2022-12-03 RX ADMIN — ASPIRIN 81 MG: 81 TABLET, COATED ORAL at 17:28

## 2022-12-03 RX ADMIN — DIGOXIN 250 MCG: 0.25 INJECTION INTRAMUSCULAR; INTRAVENOUS at 17:26

## 2022-12-03 RX ADMIN — DILTIAZEM HYDROCHLORIDE 10 MG: 5 INJECTION INTRAVENOUS at 10:26

## 2022-12-03 RX ADMIN — ENOXAPARIN SODIUM 70 MG: 100 INJECTION SUBCUTANEOUS at 10:36

## 2022-12-03 RX ADMIN — INSULIN LISPRO 1 UNITS: 100 INJECTION, SOLUTION INTRAVENOUS; SUBCUTANEOUS at 17:51

## 2022-12-03 RX ADMIN — ATORVASTATIN CALCIUM 40 MG: 40 TABLET, FILM COATED ORAL at 17:28

## 2022-12-03 ASSESSMENT — LIFESTYLE VARIABLES
HOW OFTEN DO YOU HAVE A DRINK CONTAINING ALCOHOL: NEVER
HOW MANY STANDARD DRINKS CONTAINING ALCOHOL DO YOU HAVE ON A TYPICAL DAY: PATIENT DOES NOT DRINK

## 2022-12-03 NOTE — ED NOTES
1630 call placed to Cardiology      Milagro Negron  12/03/22 1632    [address] consult completed with call back from Dr. Dewane Lanes speaking with [facility]     Milagro Negron  12/03/22 [address] [facility]

## 2022-12-03 NOTE — ED NOTES
This RN spoke with Dr. Julia Peterson from cardiology. He wants 0.25mg digoxin IV to be given once now and then 0.25mg digoxin IV given in 6 hours. He also wants pt to be started on 0.25mg orally tomorrow. Hospitalist, Adonis Argueta updated and informed on plan of care. MD Montague also does not want a repeat troponin tonight, but wants one tomorrow morning. Pt also to get 81 mg of aspirin.      Ivette Pereira RN  12/03/22 [id number]

## 2022-12-03 NOTE — ED NOTES
Pt up to bedside commode. Pt HR jumped to 140's. BP still remains soft and diltiazem gtt remains stopped.  Pending page back from MD. MIKE to re contact MD. Nova Torres RN  12/03/22 5693

## 2022-12-03 NOTE — ED NOTES
Pt noted to be hypotensive. Diltiazem gtt turned off at this time due to systolic BP less than 90. Pt denies any CP or dizziness at this time. Pt HR noted to be controlled, but remains irregular and still in A.Fib.       Nissa [id number]  12/03/22 5245

## 2022-12-03 NOTE — ED NOTES
This RN spoke with Janeen Brock PharmD about dosing of digoxin. She verified dosing and orders.       Abdulaziz Locke RN  12/03/22 2826

## 2022-12-03 NOTE — ED NOTES
[address] [facility] for consult  [address]- Dr. Junior Alcala returned call     Lane Degroot  12/03/22 Arline Mason  12/03/22 1257

## 2022-12-03 NOTE — PLAN OF CARE
PCU    New onset atrial fibrillation with RVR. Full dose lovenox. Recent ECHO below. Cardiology consult. Eder Olson PA-C  12/3/2022 9:44 AM          [id number] Score for Atrial Fibrillation Stroke Risk   Risk   Factors  Component Value   C CHF No 0   H HTN Yes 1   A2 Age >= 76 Yes,  (78 y.o.) 2   D DM Yes 1   S2 Prior Stroke/TIA No 0   V Vascular Disease No 0   A Age 74-69 No,  (78 y.o.) 0   Sc Sex female 1    [id number]  Score  5   Score last updated 12/3/22 0:80 AM EST    Click here for a link to the UpToDate guideline \"Atrial Fibrillation: Anticoagulation therapy to prevent embolization    Disclaimer: Risk Score calculation is dependent on accuracy of patient problem list and past encounter diagnosis. ECHO 11/10/22   Summary   Left ventricle size is normal with severe concentric left ventricular   hypertrophy. Left ventricular systolic function is hyperdynamic with ejection fraction   estimated at greater than 65%. No regional wall motion abnormalities are noted. Grade II diastolic dysfunction with elevated filling pressure. There is a late peaking LV outflow tract gradient of 135 mmHg consistent   with resting LVOT obstruction. Gradient increases to 160 mmHg with valsalva   maneuver. The right ventricle is normal in size and function. The left atrium is severely dilated. There is systolic anterior motion of the mitral valve with consequent severe   eccentric mitral regurgitation. Mild tricuspid regurgitation. Normal systolic pulmonary artery pressure (SPAP) estimated at 37 mmHg (RA   pressure 3 mmHg).

## 2022-12-03 NOTE — ED PROVIDER NOTES
[address] ED      CHIEF COMPLAINT  Shortness of Breath (Patient states she woke up this morning and felt hot, felt like her heart was beating irregularly and was really SOB. Had a recent echo that showed a \"something leaking, and a murmer\". ) and Tachycardia       HISTORY OF PRESENT ILLNESS  Daisy is a 69 y.o. female  who presents to the ED complaining of shortness of breath. She states that she woke up at 5 AM with the symptoms. She felt very hot felt like she had very prominent irregular heartbeat. She felt short of breath but no lightheadedness or syncope. She states that she has had episodes like this before but usually they are very short-lived and only last for few minutes. This will not has been persistent since the start. She was in her normal state health yesterday. No fevers, cough or congestion. No recent travel or surgeries. No leg pain or swelling. She did have a recent echocardiogram with evidence of LVH. Patient with diastolic dysfunction noted on echocardiogram as well with severe mitral regurgitation. She has a follow-up appointment with Dr. Alex Zambrano with cardiology is upcoming. She states that she has a dull aching just discomfort type of feeling in her chest that is in her mid chest.  She denies any nausea vomiting or diarrhea. No other complaints, modifying factors or associated symptoms. I have reviewed the following from the nursing documentation. Past Medical History:   Diagnosis Date    CTS (carpal tunnel syndrome)     DM (diabetes mellitus) ([address]     foot 4/07, micral 4/07    Elevated LFT's     HTN     Hyperlipidemia     neg gxt 11/00    Lung mass     stable    Peripheral neuropathy     Protein in urine      Past Surgical History:   Procedure Laterality Date    HYSTERECTOMY (CERVIX STATUS UNKNOWN)      LUMBAR DISC SURGERY      TRE AND BSO (CERVIX REMOVED)       History reviewed. No pertinent family history.   Social History     Socioeconomic History    Marital status:      Spouse name: Not on file    Number of children: Not on file    Years of education: Not on file    Highest education level: Not on file   Occupational History    Not on file   Tobacco Use    Smoking status: Former     Packs/day: 1.00     Years: 20.00     Pack years: 20.00     Types: Cigarettes     Quit date: 3/15/2003     Years since quittin.7    Smokeless tobacco: Never   Substance and Sexual Activity    Alcohol use: No    Drug use: No    Sexual activity: Not on file   Other Topics Concern    Not on file   Social History Narrative    Not on file     Social Determinants of Health     Financial Resource Strain: Low Risk     Difficulty of Paying Living Expenses: Not hard at all   Food Insecurity: No Food Insecurity    Worried About Running Out of Food in the Last Year: Never true    Ran Out of Food in the Last Year: Never true   Transportation Needs: Not on file   Physical Activity: Not on file   Stress: Not on file   Social Connections: Not on file   Intimate Partner Violence: Not on file   Housing Stability: Not on file     Current Facility-Administered Medications   Medication Dose Route Frequency Provider Last Rate Last Admin    dilTIAZem injection 10 mg  10 mg IntraVENous Once Nicole Beach MD        Followed by    dilTIAZem 125 mg in dextrose 5 % 125 mL infusion  2.5-15 mg/hr IntraVENous Continuous Nicole Beach MD        enoxaparin (LOVENOX) injection 70 mg  1 mg/kg SubCUTAneous Once Nicole Beach MD         Current Outpatient Medications   Medication Sig Dispense Refill    metFORMIN (GLUCOPHAGE) 1000 MG tablet TAKE 1 TABLET TWICE A DAY WITH MEALS 180 tablet 3    furosemide (LASIX) 20 MG tablet 2 or 3 qd for swelling 270 tablet 1    JANUVIA 50 MG tablet TAKE 1 TABLET DAILY 90 tablet 1    atenolol (TENORMIN) 25 MG tablet TAKE 1 TABLET DAILY 90 tablet 3    atorvastatin (LIPITOR) 40 MG tablet TAKE 1 TABLET DAILY 90 tablet 3    lisinopril-hydroCHLOROthiazide (PRINZIDE;ZESTORETIC) 20-12.5 MG per tablet TAKE 1 TABLET TWICE A  tablet 3    glipiZIDE (GLUCOTROL) 10 MG tablet TAKE 2 TABLETS TWICE A DAY BEFORE MEALS (Patient taking differently: 5 mg 2 times daily (before meals) TAKE 2 TABLETS TWICE A DAY BEFORE MEALS) 360 tablet 3    cyanocobalamin ([employer] VITAMIN B12) 1000 MCG tablet Take 1 tablet by mouth daily 30 tablet 3    vitamin E 400 UNIT capsule Take 1 capsule by mouth 2 times daily (Patient taking differently: Take 400 Units by mouth daily) 30 capsule 3    Vitamin D (CHOLECALCIFEROL) 1000 UNITS CAPS capsule Take 1,000 Units by mouth daily 1 capsule 0    Ascorbic Acid (VITAMIN C CR) 500 MG TBCR Take  by mouth. aspirin 81 MG EC tablet Take 81 mg by mouth daily. Allergies   Allergen Reactions    Actos [Pioglitazone Hydrochloride]     Levaquin [Levofloxacin]        REVIEW OF SYSTEMS  10 systems reviewed, pertinent positives per HPI otherwise noted to be negative. PHYSICAL EXAM  /73   Pulse (!) 130   Temp 97.5 °F (36.4 °C) (Oral)   Resp 24   Ht 5' 4\" (1.626 m)   Wt 159 lb (72.1 kg)   SpO2 96%   BMI 27.29 kg/m²    GENERAL APPEARANCE: Awake and alert. Cooperative. No acute distress. HENT: Normocephalic. Atraumatic. Mucous membranes are moist.  No drooling or stridor. NECK: Supple. EYES: PERRL. EOM's grossly intact. HEART/CHEST: Tachycardic and irregular. III/VI systolic murmur. 2+ radial DP pulses bilaterally. LUNGS: Respirations unlabored. CTAB. Good air exchange. Speaking comfortably in full sentences. ABDOMEN: No tenderness. Soft. Non-distended. No masses. No organomegaly. No guarding or rebound. MUSCULOSKELETAL: No extremity edema. Compartments soft. No deformity. No tenderness in the extremities. All extremities neurovascularly intact. SKIN: Warm and dry. No acute rashes. NEUROLOGICAL: Alert and oriented. CN's 2-12 intact. No gross facial drooping. No gross focal deficit  PSYCHIATRIC: Normal mood and affect.     LABS  I have reviewed all labs for this visit.    Results for orders placed or performed during the hospital encounter of 12/03/22   COVID-19 & Influenza Combo    Specimen: Nasopharyngeal Swab   Result Value Ref Range    SARS-CoV-2 RNA, RT PCR NOT DETECTED NOT DETECTED    INFLUENZA A NOT DETECTED NOT DETECTED    INFLUENZA B NOT DETECTED NOT DETECTED   CBC with Auto Differential   Result Value Ref Range    WBC 12.8 (H) 4.0 - 11.0 K/uL    RBC 3.96 (L) 4.00 - 5.20 M/uL    Hemoglobin 12.2 12.0 - 16.0 g/dL    Hematocrit 35.8 (L) 36.0 - 48.0 %    MCV 90.4 80.0 - 100.0 fL    MCH 30.7 26.0 - 34.0 pg    MCHC 34.0 31.0 - 36.0 g/dL    RDW 13.9 12.4 - 15.4 %    Platelets 340 401 - 261 K/uL    MPV 9.1 5.0 - 10.5 fL    Neutrophils % 80.2 %    Lymphocytes % 13.4 %    Monocytes % 5.4 %    Eosinophils % 0.6 %    Basophils % 0.4 %    Neutrophils Absolute 10.3 (H) 1.7 - 7.7 K/uL    Lymphocytes Absolute 1.7 1.0 - 5.1 K/uL    Monocytes Absolute 0.7 0.0 - 1.3 K/uL    Eosinophils Absolute 0.1 0.0 - 0.6 K/uL    Basophils Absolute 0.1 0.0 - 0.2 K/uL   CMP w/ Reflex to MG   Result Value Ref Range    Sodium 135 (L) 136 - 145 mmol/L    Potassium reflex Magnesium 4.4 3.5 - 5.1 mmol/L    Chloride 98 (L) 99 - 110 mmol/L    CO2 25 21 - 32 mmol/L    Anion Gap 12 3 - 16    Glucose 229 (H) 70 - 99 mg/dL    BUN 20 7 - 20 mg/dL    Creatinine 1.1 0.6 - 1.2 mg/dL    Est, Glom Filt Rate 52 (A) >60    Calcium 10.3 8.3 - 10.6 mg/dL    Total Protein 6.8 6.4 - 8.2 g/dL    Albumin 4.5 3.4 - 5.0 g/dL    Albumin/Globulin Ratio 2.0 1.1 - 2.2    Total Bilirubin 0.4 0.0 - 1.0 mg/dL    Alkaline Phosphatase 57 40 - 129 U/L    ALT 11 10 - 40 U/L    AST 14 (L) 15 - 37 U/L   Troponin   Result Value Ref Range    Troponin <0.01 <0.01 ng/mL   Brain Natriuretic Peptide   Result Value Ref Range    Pro-BNP 2,923 (H) 0 - 449 pg/mL   TSH with Reflex   Result Value Ref Range    TSH 10.05 (H) 0.27 - 4.20 uIU/mL   EKG 12 Lead   Result Value Ref Range    Ventricular Rate 125 BPM    Atrial Rate 136 BPM    QRS Duration 96 ms    Q-T Interval 328 ms    QTc Calculation (Bazett) 473 ms    R Axis -18 degrees    T Axis 135 degrees    Diagnosis       Atrial fibrillation with rapid ventricular responseVoltage criteria for left ventricular hypertrophyMarked ST abnormality, possible inferior subendocardial injuryAbnormal ECGNo previous ECGs available       ECG  The Ekg interpreted by me shows  atrial fibrillation with a rate of 125 and RVR  Axis is   Left axis deviation  QTc is  within an acceptable range  Intervals and Durations are unremarkable. ST Segments: nonspecific changes  A. fib with RVR has replaced previously noted sinus rhythm when compared with previous EKG dated 3/31/2011. RADIOLOGY  XR CHEST PORTABLE    Result Date: 12/3/2022  EXAMINATION: ONE XRAY VIEW OF THE CHEST 12/3/2022 8:21 am COMPARISON: 05/27/2016 radiograph HISTORY: ORDERING SYSTEM PROVIDED HISTORY: dyspnea TECHNOLOGIST PROVIDED HISTORY: Reason for exam:->dyspnea Reason for Exam: dyspnea FINDINGS: The heart is enlarged. Mediastinum is normal.  Increasing perihilar and a diffuse pattern of reticular airspace opacification is noted bilaterally. Relatively stable appearance of a mass in the left lower lung zone since 2016. no significant skeletal finding. Cardiomegaly with increasing interstitial change. Pattern may represent pulmonary edema or chronic interstitial lung disease. Relative stability of incompletely characterized mass in the left lower lung zone since 2016. Lack of significant change would imply a benign granuloma.          [id number] Score for Atrial Fibrillation Stroke Risk   Risk   Factors  Component Value   C CHF No 0   H HTN Yes 1   A2 Age >= 76 Yes,  (78 y.o.) 2   D DM Yes 1   S2 Prior Stroke/TIA No 0   V Vascular Disease No 0   A Age 74-69 No,  (78 y.o.) 0   Sc Sex female 1    [id number]  Score  5   Score last updated 12/3/22 8:82 AM EST    Click here for a link to the UpToDate guideline \"Atrial Fibrillation: Anticoagulation therapy to prevent embolization    Disclaimer: Risk Score calculation is dependent on accuracy of patient problem list and past encounter diagnosis. ED COURSE/MDM  Patient seen and evaluated. Old records reviewed. Labs and imaging reviewed and results discussed with patient. Patient presenting for evaluation of chest discomfort associate with palpitations, shortness of breath and noted to be tachycardic. I did note a murmur and she had a recent echocardiogram revealing severe mitral regurgitation. She was referred to follow-up with cardiology but has not yet done so. She is found to be in A. fib with RVR. Heart rates have been largely in the 120s to 140s. We will initiate diltiazem for rate control. [id number] score noted to be 5 therefore we will initiate anticoagulation with therapeutic Lovenox. She does have a leukocytosis but has not had any fevers or other signs or symptoms of infection therefore I will hold off on any antibiotics as I do not have a source or evidence of ongoing infection. Her COVID and flu are negative. She does have associated chest discomfort. No acute ischemic changes. Troponin negative. At this time, we will consult the hospitalist team for admission and further management. The total Critical Care time is 35 minutes which excludes separately billable procedures. I, Dr. Leandro Germain MD, am the primary clinician of record. Is this patient to be included in the SEP-1 Core Measure? No   Exclusion criteria - the patient is NOT to be included for SEP-1 Core Measure due to:   Infection is not suspected     During the patient's ED course, the patient was given:  Medications   dilTIAZem injection 10 mg (has no administration in time range)     Followed by   dilTIAZem 125 mg in dextrose 5 % 125 mL infusion (has no administration in time range)   enoxaparin (LOVENOX) injection 70 mg (has no administration in time range) CLINICAL IMPRESSION  1. Atrial fibrillation with RVR (HCC)    2. Chest discomfort    3. Shortness of breath    4. Elevated TSH        Blood pressure 100/73, pulse (!) 130, temperature 97.5 °F (36.4 °C), temperature source Oral, resp. rate 24, height 5' 4\" (1.626 m), weight 159 lb (72.1 kg), SpO2 96 %. Tati Fitch was admitted in stable condition. DISCLAIMER: This chart was created using Dragon dictation software. Efforts were made by me to ensure accuracy, however some errors may be present due to limitations of this technology and occasionally words are not transcribed correctly.         Emiliano Dahl MD  12/03/22 [id number]

## 2022-12-04 PROBLEM — R77.8 ELEVATED TROPONIN: Status: ACTIVE | Noted: 2022-12-04

## 2022-12-04 PROBLEM — I42.2 HYPERTROPHIC CARDIOMYOPATHY (HCC): Status: ACTIVE | Noted: 2022-12-04

## 2022-12-04 PROBLEM — R79.89 ELEVATED TROPONIN: Status: ACTIVE | Noted: 2022-12-04

## 2022-12-04 PROBLEM — I50.9 ACUTE CONGESTIVE HEART FAILURE (HCC): Status: ACTIVE | Noted: 2022-12-04

## 2022-12-04 LAB
ANION GAP SERPL CALCULATED.3IONS-SCNC: 8 MMOL/L (ref 3–16)
BUN BLDV-MCNC: 25 MG/DL (ref 7–20)
CALCIUM SERPL-MCNC: 10 MG/DL (ref 8.3–10.6)
CHLORIDE BLD-SCNC: 105 MMOL/L (ref 99–110)
CO2: 26 MMOL/L (ref 21–32)
CREAT SERPL-MCNC: 1.1 MG/DL (ref 0.6–1.2)
GFR SERPL CREATININE-BSD FRML MDRD: 52 ML/MIN/{1.73_M2}
GLUCOSE BLD-MCNC: 169 MG/DL (ref 70–99)
GLUCOSE BLD-MCNC: 170 MG/DL (ref 70–99)
GLUCOSE BLD-MCNC: 193 MG/DL (ref 70–99)
GLUCOSE BLD-MCNC: 245 MG/DL (ref 70–99)
GLUCOSE BLD-MCNC: 249 MG/DL (ref 70–99)
INR BLD: 1.06 (ref 0.87–1.14)
PERFORMED ON: ABNORMAL
POTASSIUM REFLEX MAGNESIUM: 4 MMOL/L (ref 3.5–5.1)
PROTHROMBIN TIME: 13.7 SEC (ref 11.7–14.5)
SODIUM BLD-SCNC: 139 MMOL/L (ref 136–145)
TROPONIN: 0.45 NG/ML

## 2022-12-04 PROCEDURE — 2060000000 HC ICU INTERMEDIATE R&B

## 2022-12-04 PROCEDURE — 6370000000 HC RX 637 (ALT 250 FOR IP)

## 2022-12-04 PROCEDURE — 6360000002 HC RX W HCPCS: Performed by: INTERNAL MEDICINE

## 2022-12-04 PROCEDURE — 99291 CRITICAL CARE FIRST HOUR: CPT | Performed by: INTERNAL MEDICINE

## 2022-12-04 PROCEDURE — 85610 PROTHROMBIN TIME: CPT

## 2022-12-04 PROCEDURE — 36415 COLL VENOUS BLD VENIPUNCTURE: CPT

## 2022-12-04 PROCEDURE — 80048 BASIC METABOLIC PNL TOTAL CA: CPT

## 2022-12-04 PROCEDURE — 6370000000 HC RX 637 (ALT 250 FOR IP): Performed by: INTERNAL MEDICINE

## 2022-12-04 PROCEDURE — 84484 ASSAY OF TROPONIN QUANT: CPT

## 2022-12-04 PROCEDURE — 2580000003 HC RX 258

## 2022-12-04 RX ADMIN — Medication 10 ML: at 09:24

## 2022-12-04 RX ADMIN — ASPIRIN 81 MG: 81 TABLET, COATED ORAL at 09:20

## 2022-12-04 RX ADMIN — METOPROLOL TARTRATE 25 MG: 25 TABLET, FILM COATED ORAL at 20:50

## 2022-12-04 RX ADMIN — METOPROLOL TARTRATE 25 MG: 25 TABLET, FILM COATED ORAL at 09:20

## 2022-12-04 RX ADMIN — ENOXAPARIN SODIUM 70 MG: 100 INJECTION SUBCUTANEOUS at 09:20

## 2022-12-04 RX ADMIN — Medication 10 ML: at 20:52

## 2022-12-04 RX ADMIN — ATORVASTATIN CALCIUM 40 MG: 40 TABLET, FILM COATED ORAL at 09:20

## 2022-12-04 RX ADMIN — INSULIN LISPRO 1 UNITS: 100 INJECTION, SOLUTION INTRAVENOUS; SUBCUTANEOUS at 11:40

## 2022-12-04 RX ADMIN — ENOXAPARIN SODIUM 70 MG: 100 INJECTION SUBCUTANEOUS at 20:51

## 2022-12-04 NOTE — PROGRESS NOTES
Perfect served hospitalist to inform him of patient's 2 episodes of heart rated dropping with a 3 second pause.

## 2022-12-04 NOTE — FLOWSHEET NOTE
12/04/22 0919   Vital Signs   Temp 97.2 °F (36.2 °C)   Temp Source Oral   Heart Rate 90   Heart Rate Source Monitor   Resp 18   /68   MAP (Calculated) 82   Level of Consciousness 0   MEWS Score 1   Oxygen Therapy   SpO2 95 %   O2 Device None (Room air)     VS as shown. No signs of distress noted. Pt. Assessment completed- see flow sheet. Pt. denies further needs at this time. Will continue to monitor. Call light is within reach.   Michael Diop RN

## 2022-12-04 NOTE — CARE COORDINATION
Chart reviewed. Pt will need TXF to [facility] cath lab and CT sx per Cardiology.

## 2022-12-04 NOTE — FLOWSHEET NOTE
12/04/22 1614   Vital Signs   Temp 98.4 °F (36.9 °C)   Temp Source Oral   Heart Rate 82   Heart Rate Source Monitor   Resp 16   BP (!) 107/53   MAP (Calculated) 71   MAP (mmHg) (!) 64   BP Location Left upper arm   Patient Position Supine   Level of Consciousness 0   MEWS Score 1   Oxygen Therapy   SpO2 94 %   O2 Device None (Room air)   O2 Flow Rate (L/min) 0 L/min     Pt. Resting in bed.  at bedside. VS as shown. Pt. denies further needs at this time. Will continue to monitor. Call light is within reach.   Michael Diop RN

## 2022-12-04 NOTE — PROGRESS NOTES
Patient admitted to room 321 from ED. Patient oriented to room, call light, bed rails, phone, lights and bathroom. Patient instructed about the schedule of the day including: vital sign frequency, lab draws, possible tests, frequency of MD and staff rounds, daily weights, I &O's and prescribed diet. 38 Telemetry box in place, patient aware of placement and reason. Bed locked, in lowest position, side rails up 2/4, call light within reach. Recliner Assessment  Patient is able to demonstrate the ability to move from a reclining position to an upright position within the recliner. 4 Eyes Skin Assessment     The patient is being assess for   Admission    I agree that 2 RN's have performed a thorough Head to Toe Skin Assessment on the patient. ALL assessment sites listed below have been assessed. Areas assessed for pressure by both nurses:   [x]   Head, Face, and Ears   [x]   Shoulders, Back, and Chest, Abdomen  [x]   Arms, Elbows, and Hands   [x]   Coccyx, Sacrum, and Ischium  [x]   Legs, Feet, and Heels  Scar from back sx on sacrum        Skin Assessed Under all Medical Devices by both nurses:  N/A                            **SHARE this note so that the co-signing nurse is able to place an eSignature**    Co-signer eSignature: Electronically signed by Marvel Belle RN on 12/4/22 at 2:44 AM EST    Does the Patient have Skin Breakdown related to pressure?   No              Delfin Prevention initiated:  NA   Wound Care Orders initiated:  NA      [facility] nurse consulted for Pressure Injury (Stage 3,4, Unstageable, DTI, NWPT, Complex wounds)and New or Established Ostomies:  NA      Primary Nurse eSignature: Electronically signed by Otoniel Barnett RN on 12/3/22 at 10:05 PM EST

## 2022-12-04 NOTE — PROGRESS NOTES
Patient heart rate dropped into 20's and then immediately back up into the 60's with a 3 second pause per CMU. Will continue to monitor.

## 2022-12-04 NOTE — PLAN OF CARE
Problem: Discharge Planning  Goal: Discharge to home or other facility with appropriate resources  Outcome: Progressing  Flowsheets (Taken 12/3/2022 2137)  Discharge to home or other facility with appropriate resources:   Identify barriers to discharge with patient and caregiver   Identify discharge learning needs (meds, wound care, etc)   Refer to discharge planning if patient needs post-hospital services based on physician order or complex needs related to functional status, cognitive ability or social support system   Arrange for needed discharge resources and transportation as appropriate     Problem: Safety - Adult  Goal: Free from fall injury  Outcome: Progressing     Problem: ABCDS Injury Assessment  Goal: Absence of physical injury  Outcome: Progressing

## 2022-12-04 NOTE — CONSULTS
[address] [place] [address]  [phone]        Reason for Consultation/Chief Complaint: \"I have been having palpitations and SOB. \"  Consulted for new onset afib    History of Present Illness:    Anand Bowden is a 69 y.o. patient who presented to [facility] 12/3/22 with c/o palpitations and SOB. She has PMH DM, HTN, HLD, and carpal tunnel syndrome. Reports hx murmur but unaware of any cardiac diagnosis. ECHO 11/10/22 EF>65%; grade II DD elevated filling pressure; YEIMY with severe MR; LVOT obstruction (rest 135 increased 160 with valsalva); mild TR; SPAP 37. Due to ECHO findings PCP set up cards appt for 1/23 with Dr. Vannessa Knight. Now presents with c/o palps and SOB. She is vague historian but symptoms present for at least last few months. Reports ROBLEDO with stairs and carrying things which is new and she felt related to age. Also c/o palps felt at nighttime laying in bed. Intermittent palps randomly for months. Day of admit awakened with much worse racing HR assoc with SOB and chest pressure causing ER visit. EKG afib ; LVH; ST change lateral leads consider ischemia (NSR in 3/11). CXR cardiomegaly with increasing IS change; pulm edema vs chronic ILD. Labs: KPL=[id number]; Wero < 0.01, 0.23, 0.45; BUN/Cr=25/1.1; K+ 4.0; H/H=12.2/36.8; WBC=12.8; INR 1.06; negative flu/Covid. Dilt gtt started but HR decreased and had some pauses and d/c'd. Patient with no complaints of chest pain, SOB, palpitations, dizziness, edema, or orthopnea/PND. I have been asked to provide consultation regarding further management and testing. Past Medical History:   has a past medical history of CTS (carpal tunnel syndrome), DM (diabetes mellitus) ([address] Elevated LFT's, HTN, Hyperlipidemia, Lung mass, Peripheral neuropathy, and Protein in urine. Surgical History:   has a past surgical history that includes Total abdominal hysterectomy w/ bilateral salpingoophorectomy; Lumbar disc surgery; and Hysterectomy.      Social History: reports that she quit smoking about 19 years ago. Her smoking use included cigarettes. She has a 20.00 pack-year smoking history. She has never used smokeless tobacco. She reports that she does not drink alcohol and does not use drugs. Family History:  family history includes Cancer in her father. Home Medications:  Were reviewed and are listed in nursing record. and/or listed below  Prior to Admission medications    Medication Sig Start Date End Date Taking? Authorizing Provider   metFORMIN (GLUCOPHAGE) 1000 MG tablet TAKE 1 TABLET TWICE A DAY WITH MEALS 11/15/22   Rupinder Forebs MD   furosemide (LASIX) 20 MG tablet 2 or 3 qd for swelling 10/25/22   Rupinder Forbes MD   JANUVIA 50 MG tablet TAKE 1 TABLET DAILY 9/22/22   Rupinder Forbes MD   atenolol (TENORMIN) 25 MG tablet TAKE 1 TABLET DAILY 5/2/22   Rupinder Forbes MD   atorvastatin (LIPITOR) 40 MG tablet TAKE 1 TABLET DAILY 2/17/22   Rupinder Forbes MD   lisinopril-hydroCHLOROthiazide (PRINZIDE;ZESTORETIC) 20-12.5 MG per tablet TAKE 1 TABLET TWICE A DAY  Patient taking differently: 1 tablet daily 10/19/21   Rupinder Forbes MD   glipiZIDE (GLUCOTROL) 10 MG tablet TAKE 2 TABLETS TWICE A DAY BEFORE MEALS  Patient taking differently: 5 mg 2 times daily (before meals) TAKE 2 TABLETS TWICE A DAY BEFORE MEALS 6/8/21   Rupinder Forbes MD   cyanocobalamin ([employer] VITAMIN B12) 1000 MCG tablet Take 1 tablet by mouth daily 3/11/19   Rupinder Forbes MD   vitamin E 400 UNIT capsule Take 1 capsule by mouth 2 times daily  Patient taking differently: Take 400 Units by mouth daily 3/11/19   Rupinder Forbes MD   Vitamin D (CHOLECALCIFEROL) 1000 UNITS CAPS capsule Take 1,000 Units by mouth daily 4/28/15   Rupinder Forbes MD   Ascorbic Acid (VITAMIN C CR) 500 MG TBCR Take  by mouth. Historical Provider, MD   aspirin 81 MG EC tablet Take 81 mg by mouth daily.     Historical Provider, MD        Allergies:  Actos [pioglitazone hydrochloride] and Levaquin [levofloxacin]     Review of Systems:   12 point ROS negative in all areas as listed below except as in [place]  Constitutional, EENT, Cardiovascular, pulmonary, GI, , Musculoskeletal, skin, neurological, hematological, endocrine, Psychiatric    Physical Examination:    Vitals:    12/04/22 0225   BP: 102/60   Pulse: 95   Resp: 18   Temp: 98.7 °F (37.1 °C)   SpO2: 93%    Weight: 164 lb 8 oz (74.6 kg)         General Appearance:  Alert, cooperative, no distress, appears stated age   Head:  Normocephalic, without obvious abnormality, atraumatic   Eyes:  PERRL, conjunctiva/corneas clear       Nose: Nares normal, no drainage or sinus tenderness   Throat: Lips, mucosa, and tongue normal   Neck: Supple, symmetrical, trachea midline, no adenopathy, thyroid: not enlarged, symmetric, no tenderness/mass/nodules, no carotid bruit or JVD       Lungs:   Clear to auscultation bilaterally, respirations unlabored   Chest Wall:  No tenderness or deformity   Heart:  +irregularly irregular with holosystolic II-III/VI JERAMY; S1, S2 normal, no rub or gallop   Abdomen:   Soft, non-tender, bowel sounds active all four quadrants,  no masses, no organomegaly           Extremities: Extremities normal, atraumatic, no cyanosis or edema   Pulses: 2+ and symmetric   Skin: Skin color, texture, turgor normal, no rashes or lesions   Pysch: Normal mood and affect   Neurologic: Normal gross motor and sensory exam.         Labs  CBC:   Lab Results   Component Value Date/Time    WBC 12.8 12/03/2022 08:16 AM    RBC 3.96 12/03/2022 08:16 AM    HGB 12.2 12/03/2022 08:16 AM    HCT 35.8 12/03/2022 08:16 AM    MCV 90.4 12/03/2022 08:16 AM    RDW 13.9 12/03/2022 08:16 AM     12/03/2022 08:16 AM     CMP:    Lab Results   Component Value Date/Time     12/04/2022 04:50 AM    K 4.0 12/04/2022 04:50 AM     12/04/2022 04:50 AM    CO2 26 12/04/2022 04:50 AM    BUN 25 12/04/2022 04:50 AM    CREATININE 1.1 12/04/2022 04:50 AM    GFRAA >60 06/24/2022 11:12 AM    GFRAA >60 01/10/2013 10:59 AM    AGRATIO 2.0 12/03/2022 08:16 AM    LABGLOM 52 12/04/2022 04:50 AM    GLUCOSE 170 12/04/2022 04:50 AM    PROT 6.8 12/03/2022 08:16 AM    PROT 7.5 03/09/2012 02:06 PM    CALCIUM 10.0 12/04/2022 04:50 AM    BILITOT 0.4 12/03/2022 08:16 AM    ALKPHOS 57 12/03/2022 08:16 AM    AST 14 12/03/2022 08:16 AM    ALT 11 12/03/2022 08:16 AM     PT/INR:  No results found for: PTINR  Lab Results   Component Value Date    TROPONINI 0.45 (H) 12/04/2022       EKG:  I have reviewed EKG with the following interpretation:  Impression:  See HPI    [id number] Score for Atrial Fibrillation Stroke Risk   Risk   Factors  Component Value   C CHF No 0   H HTN Yes 1   A2 Age >= 76 Yes,  (78 y.o.) 2   D DM Yes 1   S2 Prior Stroke/TIA No 0   V Vascular Disease No 0   A Age 74-69 No,  (78 y.o.) 0   Sc Sex female 1    [id number]  Score  5   Score last updated 12/4/22 8:00 AM EST      ECHO 11/10/22 Summary   Left ventricle size is normal with severe concentric left ventricular hypertrophy. Left ventricular systolic function is hyperdynamic with ejection fraction   estimated at greater than 65%. No regional wall motion abnormalities are noted. Grade II diastolic dysfunction with elevated filling pressure. There is a late peaking LV outflow tract gradient of 135 mmHg consistent   with resting LVOT obstruction. Gradient increases to 160 mmHg with valsalva maneuver. The right ventricle is normal in size and function. The left atrium is severely dilated. There is systolic anterior motion of the mitral valve with consequent severe   eccentric mitral regurgitation. Mild tricuspid regurgitation. Normal systolic pulmonary artery pressure (SPAP) estimated at 37 mmHg (RA   pressure 3 mmHg).       Compared with the prior study performed 4-8-2011 (EF65%, LVH, DD1, YEIMY, mod   MR, LVOT obstruction mean PG 47 mmHg), the LVEF remains hyperdynamic,   resting LVOT obstruction has worsened, with the degree of mitral   regurgitation now noted severe. Assessment:  Keeley Elliott is a 69 y.o. patient who presented to [facility] 12/3/22 with c/o palpitations and SOB. She has PMH DM, HTN, HLD, and carpal tunnel syndrome. Reports hx murmur but unaware of any cardiac diagnosis. ECHO 11/10/22 EF>65%; grade II DD elevated filling pressure; YEIMY with severe MR; LVOT obstruction (rest 135 increased 160 with valsalva); mild TR; SPAP 37. Due to ECHO findings PCP set up cards appt for 1/23 with Dr. Zoila Hunt. Now presents with c/o palps and SOB. She is vague historian but symptoms present for at least last few months. Reports ROBLEDO with stairs and carrying things which is new and she felt related to age. Also c/o palps felt at nighttime laying in bed. Intermittent palps randomly for months. Day of admit awakened with much worse racing HR assoc with SOB and chest pressure causing ER visit. EKG afib ; LVH; ST change lateral leads consider ischemia (NSR in 3/11). CXR cardiomegaly with increasing IS change; pulm edema vs chronic ILD. Labs: WKB=5220; Wero < 0.01, 0.23, 0.45; BUN/Cr=25/1.1; K+ 4.0; H/H=12.2/36.8; WBC=12.8; INR 1.06; negative flu/Covid. Dilt gtt started but HR decreased and had some pauses and d/c'd. P   Diagnosis of afib RVR, elevated Wero, CHF, and HCM in elderly female. Recs;  1. ECHO 11/22 with HCM with pronounced LVOT gradients and YEIMY with severe MR. Findings worse compared to 2011 study. She has not followed with cardiology and only aware of murmur. 2. Elevated Wero may be related to demand ischemia from tachycardia but lateral ST changes on EKG and CAD needs to be considered. 3. Continue lovenox full dose for AC given afib and elevated Wero. 4. Start metoprolol 25mg BID for HR control and HCM. 5. Received 2 doses IV digoxin overnight. Hold on further dose if able. 6. Continue baby asa and lipitor 40mg qd.  7. Likely needs transfer to [facility] for evaluation with LHC/RHC, LORNA, and CT surgery evaluation.  Will d/w interventional partners. I spent 40 minutes reviewing chart, exam/history, reviewing ECHO, d/w nurse on complicated patient. Patient Active Problem List   Diagnosis    Protein in urine    CTS (carpal tunnel syndrome)    Lung mass    Vitamin D deficiency    Non-rheumatic mitral regurgitation    Elevated LFTs    Osteopenia    Type 2 diabetes mellitus with diabetic polyneuropathy, without long-term current use of insulin (HCC)    Essential hypertension    Hypercholesteremia    Cervical spine disease    Gastroesophageal reflux disease without esophagitis    Lower extremity edema    Arthritis    Vitamin B12 deficiency    Idiopathic progressive neuropathy    LVH (left ventricular hypertrophy)    Atrial fibrillation with RVR ([address]     Thank you for allowing to us to participate in the [facility] or [facility]. Further evaluation will be based upon the patient's clinical course and testing results.

## 2022-12-04 NOTE — PROGRESS NOTES
Patient heart rate dropped into the 30's and quickly went back into the 60's per [facility]. Will continue to monitor.

## 2022-12-04 NOTE — H&P
History and Physical        Chief complaints: Palpitations and shortness of breath. HISTORY OF PRESENT ILLNESS:   Very pleasant 52-year-old female with history of diabetes mellitus, hypertension and hyperlipidemia presenting with sudden onset of chest palpitations associated with significant chest pressure and shortness of breath. Patient states that she woke up in the middle of the night  with her chest pounding; stated with severe chest pressure and shortness of breath. Palpitations were persistent, on presentation to the ER patient was in rapid A. Fib. Started on Cardizem drip. Patient does have a history of heart murmur, and recently had an echocardiogram as above milligrams louder. Echocardiogram showed severe mitral regurgitation and hypertrophic cardiomyopathy, normal EF.  patient was scheduled to see cardiologist next month. Heart rate improved on Cardizem drip, subsequently Cardizem drip was held and stopped in the ED due to decrease in heart rate; patient given 2 doses of IV digoxin. Continued anticoagulation with Lovenox. Troponin trending up. Seen in consultation by cardiology. Currently patient remains in A. fib heart rate is more controlled, she feels much better now. Denies any chest pain or shortness of breath. Denies any palpitations. Patient does recall that she might have had intermittent palpitations off and on for the last few months, but nothing as severe or persistent as she had previous night before coming to the ED  No history of any dizziness, no fevers or chills.      Past Medical History:   Diagnosis Date    CTS (carpal tunnel syndrome)     DM (diabetes mellitus) ([facility] [address]     foot 4/07, micral 4/07    Elevated LFT's     HTN     Hyperlipidemia     neg gxt 11/00    Lung mass     stable    Peripheral neuropathy     Protein in urine        Past Surgical History:   Procedure Laterality Date    HYSTERECTOMY (CERVIX STATUS UNKNOWN)      LUMBAR DISC SURGERY      TRE AND BSO (CERVIX REMOVED)         Patient is allergic to actos [pioglitazone hydrochloride] and levaquin [levofloxacin]. Prior to Admission medications    Medication Sig Start Date End Date Taking? Authorizing Provider   metFORMIN (GLUCOPHAGE) 1000 MG tablet TAKE 1 TABLET TWICE A DAY WITH MEALS 11/15/22   Desiree Bundy MD   furosemide (LASIX) 20 MG tablet 2 or 3 qd for swelling 10/25/22   Desiree Bundy MD   JANUVIA 50 MG tablet TAKE 1 TABLET DAILY 9/22/22   Desiree Bundy MD   atenolol (TENORMIN) 25 MG tablet TAKE 1 TABLET DAILY 5/2/22   Desiree Bundy MD   atorvastatin (LIPITOR) 40 MG tablet TAKE 1 TABLET DAILY 2/17/22   Desiree Bundy MD   lisinopril-hydroCHLOROthiazide (PRINZIDE;ZESTORETIC) 20-12.5 MG per tablet TAKE 1 TABLET TWICE A DAY  Patient taking differently: 1 tablet daily 10/19/21   Desiree Bundy MD   glipiZIDE (GLUCOTROL) 10 MG tablet TAKE 2 TABLETS TWICE A DAY BEFORE MEALS  Patient taking differently: 5 mg 2 times daily (before meals) TAKE 2 TABLETS TWICE A DAY BEFORE MEALS 6/8/21   Desiree Bundy MD   cyanocobalamin ([employer] VITAMIN B12) 1000 MCG tablet Take 1 tablet by mouth daily 3/11/19   Desiree Bundy MD   vitamin E 400 UNIT capsule Take 1 capsule by mouth 2 times daily  Patient taking differently: Take 400 Units by mouth daily 3/11/19   Desiree Bundy MD   Vitamin D (CHOLECALCIFEROL) 1000 UNITS CAPS capsule Take 1,000 Units by mouth daily 4/28/15   Desiree Bundy MD   Ascorbic Acid (VITAMIN C CR) 500 MG TBCR Take  by mouth. Historical Provider, MD   aspirin 81 MG EC tablet Take 81 mg by mouth daily.     Historical Provider, MD       Social History     Socioeconomic History    Marital status:      Spouse name: None    Number of children: None    Years of education: None    Highest education level: None   Tobacco Use    Smoking status: Former     Packs/day: 1.00     Years: 20.00     Pack years: 20.00     Types: Cigarettes     Quit date: 3/15/2003     Years since quitting: 19.7    Smokeless tobacco: Never   Substance and Sexual Activity    Alcohol use: No    Drug use: No     Social Determinants of Health     Financial Resource Strain: Low Risk     Difficulty of Paying Living Expenses: Not hard at all   Food Insecurity: No Food Insecurity    Worried About Running Out of Food in the Last Year: Never true    Ran Out of Food in the Last Year: Never true       Family History   Problem Relation Age of Onset    Cancer Father        REVIEW OF SYSTEMS:   Constitutional: Negative for fever   HEENT: Negative for sore throat   Eyes: Negative for redness   Respiratory: Negative for  cough   Cardiovascular: Negative for chest pain , shortness of breath or palpitations  Gastrointestinal: Negative for vomiting, diarrhea   Genitourinary: Negative for hematuria   Musculoskeletal: Negative for arthralgias   Skin: Negative for rash   Neurological: Negative for syncope   Hematological: Negative for adenopathy   Psychiatric/Behavorial: Negative for anxiety    On Physical Examination    Vitals:    12/04/22 0919   BP: 109/68   Pulse: 90   Resp: 18   Temp: 97.2 °F (36.2 °C)   SpO2: 95%       Gen: No distress. Alert. Awake and well-oriented  Eyes: PERRL. No sclera icterus. No conjunctival injection. ENT: No discharge. Pharynx clear. Neck: Trachea midline. Normal thyroid. Resp: No accessory muscle use. No crackles. No wheezes. No rhonchi. No dullness on percussion. CV: Irregularly irregular, loud systolic murmur present. GI: Non-tender. Non-distended. No masses. No organomegaly. Normal bowel sounds. No hernia. Skin: Warm and dry. No nodule on exposed extremities. No rash on exposed extremities. Lymph: No cervical LAD. No supraclavicular LAD. M/S: No cyanosis. No joint deformity. No clubbing. Intact peripheral pulses. Brisk cap refill, < 2 secs  Neuro: Awake. Reflexes 2+ symmetric bilaterally   Psych: Oriented x 3. No anxiety or agitation.      CBC:   Recent Labs     12/03/22  0816   WBC 12.8* HGB 12.2   HCT 35.8*   MCV 90.4        BMP:   Recent Labs     12/03/22  0816 12/04/22  0450   * 139   K 4.4 4.0   CL 98* 105   CO2 25 26   BUN 20 25*   CREATININE 1.1 1.1     LIVER PROFILE:   Recent Labs     12/03/22  0816   AST 14*   ALT 11   BILITOT 0.4   ALKPHOS 57     PT/INR:   Recent Labs     12/04/22  0450   PROTIME 13.7   INR 1.06     APTT: No results for input(s): APTT in the last 72 hours. UA:No results for input(s): NITRITE, COLORU, PHUR, LABCAST, WBCUA, RBCUA, MUCUS, TRICHOMONAS, YEAST, BACTERIA, CLARITYU, SPECGRAV, LEUKOCYTESUR, UROBILINOGEN, BILIRUBINUR, BLOODU, GLUCOSEU, AMORPHOUS in the last 72 hours. Invalid input(s): KETONESU      EKG  -A. fib with rapid ventricular response      Cultures  COVID and influenza not detected      Radiology  XR CHEST PORTABLE   Final Result   Cardiomegaly with increasing interstitial change. Pattern may represent   pulmonary edema or chronic interstitial lung disease. Relative stability of   incompletely characterized mass in the left lower lung zone since 2016. Lack   of significant change would imply a benign granuloma. Echocardiogram November 2022   Summary   Left ventricle size is normal with severe concentric left ventricular   hypertrophy. Left ventricular systolic function is hyperdynamic with ejection fraction   estimated at greater than 65%. No regional wall motion abnormalities are noted. Grade II diastolic dysfunction with elevated filling pressure. There is a late peaking LV outflow tract gradient of 135 mmHg consistent   with resting LVOT obstruction. Gradient increases to 160 mmHg with valsalva   maneuver. The right ventricle is normal in size and function. The left atrium is severely dilated. There is systolic anterior motion of the mitral valve with consequent severe   eccentric mitral regurgitation. Mild tricuspid regurgitation.    Normal systolic pulmonary artery pressure (SPAP) estimated at 37 mmHg (RA   pressure 3 mmHg). Compared with the prior study performed 4-8-2011 (EF65%, LVH, DD1, YEIMY, mod   MR, LVOT obstruction mean PG 47 mmHg), the LVEF remains hyperdynamic,   resting LVOT obstruction has worsened, with the degree of mitral   regurgitation now noted severe. ASSESSMENT:    Principal Problem:    Atrial fibrillation with RVR ([facility])  Active Problems:    Hypertrophic cardiomyopathy (HCC)    Acute congestive heart failure (HCC)    Elevated troponin    Severe mitral regurgitation  Resolved Problems:    * No resolved hospital problems. *      PLAN:  #New onset A. fib with rapid ventricular response  #Valvular heart disease with severe mitral regurgitation, hypertrophic cardiomyopathy  -Patient with history of heart murmur which is significantly worse recently; recent echo showing increased degree of mitral regurgitation now severe; also has left ventricular outflow tract obstruction, hypertrophic cardiomyopathy. Ejection fraction normal.  Grade 2 diastolic dysfunction noted. -Patient now presenting with new onset rapid A. Fib. Likely precipitated due to valvular heart disease.  -Seen in consultation by cardiology  -Heart rate improved on Cardizem drip; she is off Cardizem now, she was given 2 dose of digoxin. Currently continued on beta-blockers metoprolol. Continue to monitor on telemetry  -[id number] score is 5. Continue full dose anticoagulation with Lovenox.   -Plan cardiac cath in a.m. #Elevated troponin  -Less than 0.01 > 0.23 > 0.45.  -Likely related to tachyarrhythmia  -Plan is for cardiac cath in a.m.  -Continue full dose anticoagulation  -Continue aspirin and statins    # TSH    Elevated at 10. Will hold off on starting synthroid now     #Diabetes mellitus  -on sliding scale insulin    #Hypertension  Blood pressure stable    #Hyperlipidemia  -On statins       Diet NPO Exceptions are: Ice Chips, Sips of Water with Meds  ADULT DIET; Regular; 4 carb choices (60 gm/meal);  Low Sodium (2 gm)  Full Code        Isidro Eckert MD 12/4/2022 1:08 PM

## 2022-12-05 ENCOUNTER — HOSPITAL ENCOUNTER (INPATIENT)
Dept: CARDIAC CATH/INVASIVE PROCEDURES | Age: 77
LOS: 2 days | Discharge: HOME | DRG: 282 | End: 2022-12-07
Attending: INTERNAL MEDICINE | Admitting: INTERNAL MEDICINE
Payer: MEDICARE

## 2022-12-05 VITALS
SYSTOLIC BLOOD PRESSURE: 142 MMHG | TEMPERATURE: 97.3 F | HEIGHT: 64 IN | WEIGHT: 163.38 LBS | RESPIRATION RATE: 16 BRPM | BODY MASS INDEX: 27.89 KG/M2 | HEART RATE: 73 BPM | OXYGEN SATURATION: 97 % | DIASTOLIC BLOOD PRESSURE: 71 MMHG

## 2022-12-05 DIAGNOSIS — I42.2 HYPERTROPHIC CARDIOMYOPATHY (HCC): ICD-10-CM

## 2022-12-05 DIAGNOSIS — I42.1 HOCM (HYPERTROPHIC OBSTRUCTIVE CARDIOMYOPATHY) (HCC): Primary | ICD-10-CM

## 2022-12-05 PROBLEM — I34.0 NONRHEUMATIC MITRAL VALVE REGURGITATION: Status: ACTIVE | Noted: 2022-12-05

## 2022-12-05 PROBLEM — R06.02 SHORTNESS OF BREATH: Status: ACTIVE | Noted: 2022-12-05

## 2022-12-05 PROBLEM — I21.4 NSTEMI (NON-ST ELEVATED MYOCARDIAL INFARCTION) (HCC): Status: ACTIVE | Noted: 2022-12-05

## 2022-12-05 LAB
GLUCOSE BLD-MCNC: 205 MG/DL (ref 70–99)
GLUCOSE BLD-MCNC: 207 MG/DL (ref 70–99)
GLUCOSE BLD-MCNC: 225 MG/DL (ref 70–99)
PERFORMED ON: ABNORMAL
POC ACT LR: 264 SEC
TROPONIN: 0.32 NG/ML

## 2022-12-05 PROCEDURE — 36415 COLL VENOUS BLD VENIPUNCTURE: CPT

## 2022-12-05 PROCEDURE — C1894 INTRO/SHEATH, NON-LASER: HCPCS

## 2022-12-05 PROCEDURE — 99233 SBSQ HOSP IP/OBS HIGH 50: CPT | Performed by: INTERNAL MEDICINE

## 2022-12-05 PROCEDURE — 6360000004 HC RX CONTRAST MEDICATION

## 2022-12-05 PROCEDURE — 6370000000 HC RX 637 (ALT 250 FOR IP)

## 2022-12-05 PROCEDURE — 2580000003 HC RX 258: Performed by: INTERNAL MEDICINE

## 2022-12-05 PROCEDURE — 2060000000 HC ICU INTERMEDIATE R&B

## 2022-12-05 PROCEDURE — 93460 R&L HRT ART/VENTRICLE ANGIO: CPT

## 2022-12-05 PROCEDURE — 84484 ASSAY OF TROPONIN QUANT: CPT

## 2022-12-05 PROCEDURE — 4A023N8 MEASUREMENT OF CARDIAC SAMPLING AND PRESSURE, BILATERAL, PERCUTANEOUS APPROACH: ICD-10-PCS | Performed by: INTERNAL MEDICINE

## 2022-12-05 PROCEDURE — 6360000002 HC RX W HCPCS

## 2022-12-05 PROCEDURE — 2500000003 HC RX 250 WO HCPCS

## 2022-12-05 PROCEDURE — 99238 HOSP IP/OBS DSCHRG MGMT 30/<: CPT

## 2022-12-05 PROCEDURE — 6370000000 HC RX 637 (ALT 250 FOR IP): Performed by: INTERNAL MEDICINE

## 2022-12-05 PROCEDURE — B2111ZZ FLUOROSCOPY OF MULTIPLE CORONARY ARTERIES USING LOW OSMOLAR CONTRAST: ICD-10-PCS | Performed by: INTERNAL MEDICINE

## 2022-12-05 PROCEDURE — 2580000003 HC RX 258

## 2022-12-05 PROCEDURE — 85347 COAGULATION TIME ACTIVATED: CPT

## 2022-12-05 PROCEDURE — C1769 GUIDE WIRE: HCPCS

## 2022-12-05 PROCEDURE — C1887 CATHETER, GUIDING: HCPCS

## 2022-12-05 PROCEDURE — 93460 R&L HRT ART/VENTRICLE ANGIO: CPT | Performed by: INTERNAL MEDICINE

## 2022-12-05 PROCEDURE — 99152 MOD SED SAME PHYS/QHP 5/>YRS: CPT | Performed by: INTERNAL MEDICINE

## 2022-12-05 PROCEDURE — 2709999900 HC NON-CHARGEABLE SUPPLY

## 2022-12-05 PROCEDURE — 6360000002 HC RX W HCPCS: Performed by: INTERNAL MEDICINE

## 2022-12-05 RX ORDER — ENOXAPARIN SODIUM 100 MG/ML
40 INJECTION SUBCUTANEOUS DAILY
Status: DISCONTINUED | OUTPATIENT
Start: 2022-12-05 | End: 2022-12-05

## 2022-12-05 RX ORDER — ACETAMINOPHEN 650 MG/1
650 SUPPOSITORY RECTAL EVERY 6 HOURS PRN
Status: DISCONTINUED | OUTPATIENT
Start: 2022-12-05 | End: 2022-12-07 | Stop reason: HOSPADM

## 2022-12-05 RX ORDER — ATORVASTATIN CALCIUM 40 MG/1
40 TABLET, FILM COATED ORAL DAILY
Status: DISCONTINUED | OUTPATIENT
Start: 2022-12-06 | End: 2022-12-07 | Stop reason: HOSPADM

## 2022-12-05 RX ORDER — ACETAMINOPHEN 325 MG/1
650 TABLET ORAL EVERY 6 HOURS PRN
Status: DISCONTINUED | OUTPATIENT
Start: 2022-12-05 | End: 2022-12-07 | Stop reason: HOSPADM

## 2022-12-05 RX ORDER — SODIUM CHLORIDE 0.9 % (FLUSH) 0.9 %
5-40 SYRINGE (ML) INJECTION PRN
Status: DISCONTINUED | OUTPATIENT
Start: 2022-12-05 | End: 2022-12-07 | Stop reason: HOSPADM

## 2022-12-05 RX ORDER — ASPIRIN 81 MG/1
81 TABLET ORAL DAILY
Status: DISCONTINUED | OUTPATIENT
Start: 2022-12-06 | End: 2022-12-07 | Stop reason: HOSPADM

## 2022-12-05 RX ORDER — FUROSEMIDE 40 MG/1
40 TABLET ORAL DAILY
Status: DISCONTINUED | OUTPATIENT
Start: 2022-12-05 | End: 2022-12-07 | Stop reason: HOSPADM

## 2022-12-05 RX ORDER — ATENOLOL 25 MG/1
25 TABLET ORAL DAILY
Status: DISCONTINUED | OUTPATIENT
Start: 2022-12-05 | End: 2022-12-07 | Stop reason: HOSPADM

## 2022-12-05 RX ORDER — INSULIN LISPRO 100 [IU]/ML
0-4 INJECTION, SOLUTION INTRAVENOUS; SUBCUTANEOUS
Status: DISCONTINUED | OUTPATIENT
Start: 2022-12-05 | End: 2022-12-07 | Stop reason: HOSPADM

## 2022-12-05 RX ORDER — ENOXAPARIN SODIUM 100 MG/ML
1 INJECTION SUBCUTANEOUS 2 TIMES DAILY
Status: DISCONTINUED | OUTPATIENT
Start: 2022-12-05 | End: 2022-12-07

## 2022-12-05 RX ORDER — MIDAZOLAM HYDROCHLORIDE 1 MG/ML
INJECTION INTRAMUSCULAR; INTRAVENOUS
Status: COMPLETED | OUTPATIENT
Start: 2022-12-05 | End: 2022-12-05

## 2022-12-05 RX ORDER — POLYETHYLENE GLYCOL 3350 17 G/17G
17 POWDER, FOR SOLUTION ORAL DAILY PRN
Status: DISCONTINUED | OUTPATIENT
Start: 2022-12-05 | End: 2022-12-07 | Stop reason: HOSPADM

## 2022-12-05 RX ORDER — SODIUM CHLORIDE 0.9 % (FLUSH) 0.9 %
5-40 SYRINGE (ML) INJECTION EVERY 12 HOURS SCHEDULED
Status: DISCONTINUED | OUTPATIENT
Start: 2022-12-05 | End: 2022-12-07 | Stop reason: HOSPADM

## 2022-12-05 RX ORDER — DEXTROSE MONOHYDRATE 100 MG/ML
INJECTION, SOLUTION INTRAVENOUS CONTINUOUS PRN
Status: DISCONTINUED | OUTPATIENT
Start: 2022-12-05 | End: 2022-12-07 | Stop reason: HOSPADM

## 2022-12-05 RX ORDER — DIMETHICONE, OXYBENZONE, AND PADIMATE O 2; 2.5; 6.6 G/100G; G/100G; G/100G
STICK TOPICAL PRN
Status: DISCONTINUED | OUTPATIENT
Start: 2022-12-05 | End: 2022-12-05 | Stop reason: HOSPADM

## 2022-12-05 RX ORDER — ACETAMINOPHEN 325 MG/1
650 TABLET ORAL EVERY 4 HOURS PRN
Status: DISCONTINUED | OUTPATIENT
Start: 2022-12-05 | End: 2022-12-07

## 2022-12-05 RX ORDER — SODIUM CHLORIDE 9 MG/ML
INJECTION, SOLUTION INTRAVENOUS PRN
Status: DISCONTINUED | OUTPATIENT
Start: 2022-12-05 | End: 2022-12-07 | Stop reason: HOSPADM

## 2022-12-05 RX ORDER — INSULIN LISPRO 100 [IU]/ML
0-4 INJECTION, SOLUTION INTRAVENOUS; SUBCUTANEOUS NIGHTLY
Status: DISCONTINUED | OUTPATIENT
Start: 2022-12-05 | End: 2022-12-07 | Stop reason: HOSPADM

## 2022-12-05 RX ORDER — HEPARIN SODIUM 1000 [USP'U]/ML
INJECTION, SOLUTION INTRAVENOUS; SUBCUTANEOUS
Status: COMPLETED | OUTPATIENT
Start: 2022-12-05 | End: 2022-12-05

## 2022-12-05 RX ORDER — ONDANSETRON 4 MG/1
4 TABLET, ORALLY DISINTEGRATING ORAL EVERY 8 HOURS PRN
Status: DISCONTINUED | OUTPATIENT
Start: 2022-12-05 | End: 2022-12-07 | Stop reason: HOSPADM

## 2022-12-05 RX ORDER — ONDANSETRON 2 MG/ML
4 INJECTION INTRAMUSCULAR; INTRAVENOUS EVERY 6 HOURS PRN
Status: DISCONTINUED | OUTPATIENT
Start: 2022-12-05 | End: 2022-12-07 | Stop reason: HOSPADM

## 2022-12-05 RX ORDER — FENTANYL CITRATE 50 UG/ML
INJECTION, SOLUTION INTRAMUSCULAR; INTRAVENOUS
Status: COMPLETED | OUTPATIENT
Start: 2022-12-05 | End: 2022-12-05

## 2022-12-05 RX ADMIN — MIDAZOLAM HYDROCHLORIDE 1 MG: 1 INJECTION INTRAMUSCULAR; INTRAVENOUS at 12:18

## 2022-12-05 RX ADMIN — FENTANYL CITRATE 25 MCG: 50 INJECTION, SOLUTION INTRAMUSCULAR; INTRAVENOUS at 12:18

## 2022-12-05 RX ADMIN — ENOXAPARIN SODIUM 70 MG: 100 INJECTION SUBCUTANEOUS at 22:33

## 2022-12-05 RX ADMIN — Medication 10 ML: at 22:34

## 2022-12-05 RX ADMIN — ASPIRIN 81 MG: 81 TABLET, COATED ORAL at 09:26

## 2022-12-05 RX ADMIN — HEPARIN SODIUM 5000 UNITS: 1000 INJECTION, SOLUTION INTRAVENOUS; SUBCUTANEOUS at 12:29

## 2022-12-05 RX ADMIN — Medication: at 09:30

## 2022-12-05 RX ADMIN — MIDAZOLAM HYDROCHLORIDE 1 MG: 1 INJECTION INTRAMUSCULAR; INTRAVENOUS at 12:29

## 2022-12-05 RX ADMIN — ATORVASTATIN CALCIUM 40 MG: 40 TABLET, FILM COATED ORAL at 09:26

## 2022-12-05 RX ADMIN — Medication 10 ML: at 08:26

## 2022-12-05 NOTE — PROGRESS NOTES
Received page from cath lab regarding request for admission. Forwarded to Dr. Deborah Craig, admitting provider.

## 2022-12-05 NOTE — PROCEDURES
CARDIAC CATHETERIZATION REPORT    Date of Procedure: 12/5/2022  : Andrea Bedolla DO  Primary Indication: NSTEMI, HOCM, severe eccentric mitral regurgitation    Procedures Performed:  1. Coronary angiography  2. Left heart catheterization  3. Right heart catheterization  4. Ultrasound-guided right radial artery access  5. Moderate conscious sedation    Procedural Details:  Access: Local anesthetic was given and access was obtained in the right radial artery using a micropuncture technique and a 6F Terumo Slender Sheath was placed without difficulty. A 5F Terumo Sheath was placed in the right brachial vein via wire exchange of an existing IV that was placed under sterile conditions. Diagnostic: A 5F Alveria Gravely catheter was used to perform the right heart catheterization. A 5F TIG catheter was used to perform selective right and left coronary angiography. The 5F TIG catheter was also used to perform the left heart catheterization. Hemostasis: At the end of the procedure, the radial sheath was removed and a hemoband was placed over the arteriotomy site and filled with air to maintain hemostasis. The venous sheath was removed and manual pressure applied to maintain hemostasis. Findings:  Hemodynamics:  A. Right heart catheterization                   1. RA: 6 mmHg                   2. RV: 46/7 mmHg                   3. PA: 17 mmHg                   4. PCWP:17 mmHg (v-waves as high as 26 mmHg)                   5. Saturations: AO 90%, RA 54%, PA 57%                   6. Ana CO: 4.0 L/min                   7. Ana CI: 2.23 L/min*m2                   8. Ana SVR: 1,400 dyne*sec/cm5                   9. Ana PVR: 240 dyne*sec/cm5     B. Opening arterial pressure: 109/62 (76) mmHg   C. LV pressure: 168/15 at baseline and LV systolic pressure increased to 229 mmHg post-PVC   D. Peak-to-peak gradient of 59 mmHg on catheter pull-back across aortic valve    2. Coronary anatomy:  A.  Left main artery: The left main artery bifurcates into the left anterior descending artery and left circumflex artery. The left main artery is very short and has no angiographically significant disease. B. Left anterior descending artery: Transapical vessel which gives rise to 4 diagonal arteries. The LAD has a 30% proximal stenosis. The remainder of the vessel has minor luminal irregularities. The first diagonal artery is a medium-large caliber vessel with a high orin and has a 20% ostial stenosis. The second, third, and fourth diagonal arteries are very small. C. Left circumflex artery: Non-dominant vessel that gives rise to 4 obtuse marginal arteries. The Lcx has 20% proximal and 30% mid-vessel stenoses. The OM1 is a small vessel with a high origin and has a 30% proximal stenosis. OM2 is very small and has minor luminal irregularities. The OM3 is a medium caliber vessel with minor luminal irregularities. The OM4 is a small to medium caliber vessel with minor luminal irregularities. D. Right coronary artery: Dominant vessel that gives rise to the posterior descending artery and 2 posterolateral branches. The proximal RCA has a 20% stenosis. The remainder of the vessel has minor luminal irregularities. The posterior descending artery has minor luminal irregularities. The right posterolateral branches have minor luminal irregularities. Technical Factors:  Complications:  None. Estimated blood loss: Minimal.  Radiation:  Air kerma 579 mGy and 2.7 minutes of fluoroscopy  Sedation: Moderate conscious sedation was administered by qualified nursing personnel under continuous hemodynamic monitoring, starting at 12:14 PM and ending at 12:36 PM.  Medications: 2.5 mg IA verapamil, 2 mg IV Versed, 25 mcg IV Fentanyl, 5,000 units IV heparin  Contrast: 30 cc of Isovue     Impression:  1. Probable type 2 NSTEMI secondary to demand ischemia from atrial fibrillation with RVR. 2. Mild non-obstructive coronary artery disease.   3. Mildly elevated left-sided cardiac filling pressures. 4. Mildly elevated right-sided cardiac filling pressures. 5. Mild pulmonary hypertension. 6. Low normal Ana cardiac output and index. 7. TTE findings are suggestive of HOCM. There was a peak-to-peak gradient of 59 mmHg on catheter pull-back across the aortic valve. 8. Severe eccentric mitral regurgitation on TTE. 9. Atrial fibrillation. Plan:  1. Can resume SC lovenox 1 mg/kg this evening (atrial fibrillation). 2. Continue aspirin 81 mg daily, atorvastatin 40 mg daily, atenolol 25 mg daily, and lasix 40 mg daily (need to be cautious not to drop preload too drastically in setting of HOCM). 3. Plan for [facility] tomorrow to further evaluate severity and etiology of mitral regurgitation.   4. Pending results of above, can consider CT surgery and/or structural heart team evaluation for more definitive treatment of HOCM and MRDee      Waters Jw, [address]

## 2022-12-05 NOTE — PROGRESS NOTES
Patient has had several pauses with heart rate dropping into the 30's, heart rate quickly recovers back into the 60's. Longest pause was 2.28 seconds per [facility]. Will continue to monitor.

## 2022-12-05 NOTE — DISCHARGE SUMMARY
Name:  Bebeto Marie  Room:  /9327-86  MRN:    [id number]    Discharge Summary      This discharge summary is in conjunction with a complete physical exam done on the day of discharge. Discharging Provider: Raeann Marcano PA-C  Discharging Attending Physician: Dr. Alcantara Fallin/3/2022  Discharge:  2022    HPI taken from admission H&P:    Very pleasant 78-year-old female with history of diabetes mellitus, hypertension and hyperlipidemia presenting with sudden onset of chest palpitations associated with significant chest pressure and shortness of breath. Patient states that she woke up in the middle of the night  with her chest pounding; stated with severe chest pressure and shortness of breath. Palpitations were persistent, on presentation to the ER patient was in rapid A. Fib. Started on Cardizem drip. Patient does have a history of heart murmur, and recently had an echocardiogram as above milligrams louder. Echocardiogram showed severe mitral regurgitation and hypertrophic cardiomyopathy, normal EF.  patient was scheduled to see cardiologist next month. Heart rate improved on Cardizem drip, subsequently Cardizem drip was held and stopped in the ED due to decrease in heart rate; patient given 2 doses of IV digoxin. Continued anticoagulation with Lovenox. Troponin trending up. Seen in consultation by cardiology. Currently patient remains in A. fib heart rate is more controlled, she feels much better now. Denies any chest pain or shortness of breath. Denies any palpitations. Patient does recall that she might have had intermittent palpitations off and on for the last few months, but nothing as severe or persistent as she had previous night before coming to the ED  No history of any dizziness, no fevers or chills.          Diagnoses this Admission and Hospital Course   #New onset A. fib with rapid ventricular response  #Valvular heart disease with severe mitral regurgitation, hypertrophic cardiomyopathy  -Patient with history of heart murmur which is significantly worse recently; recent echo showing increased degree of mitral regurgitation now severe; also has left ventricular outflow tract obstruction, hypertrophic cardiomyopathy. Ejection fraction normal.  Grade 2 diastolic dysfunction noted. -Patient now presenting with new onset rapid A. Fib. Likely precipitated due to valvular heart disease.  -Seen in consultation by cardiology  -Heart rate improved on Cardizem drip; she is off Cardizem now, she was given 2 dose of digoxin. Currently continued on beta-blockers metoprolol. Continue to monitor on telemetry  -HR has improved today   -[id number] score is 5. Continue full dose anticoagulation with Lovenox.   -Plan cardiac cath in a.m.--> transferring today to [facility] for now      #Elevated troponin  -Less than 0.01 > 0.23 > 0.45.--> 0.32  -Likely related to tachyarrhythmia  -Plan is for cardiac cath today   -Continue full dose anticoagulation  -Continue aspirin and statins     # TSH    Elevated at 10. Will hold off on starting synthroid now      #Diabetes mellitus  -on sliding scale insulin     #Hypertension  Blood pressure stable     #Hyperlipidemia  -On statins    Procedures (Please Review Full Report for Details)  N/A    Consults    Cardiology       Physical Exam at Discharge:    BP (!) 142/71   Pulse 73   Temp 97.3 °F (36.3 °C) (Oral)   Resp 16   Ht 5' 4\" (1.626 m)   Wt 163 lb 6 oz (74.1 kg)   SpO2 97%   BMI 28.04 kg/m²     Gen: No distress. Alert. Pleasant elderly female   Eyes: PERRL. No sclera icterus. No conjunctival injection. Neck: No JVD. Trachea midline. Resp: No accessory muscle use. No crackles. No wheezes. No rhonchi. CV: No rub. No edema. ++Irregularly irregular rate and rhythm, loud systolic murmur   Peripheral Pulses: +2 palpable, equal bilaterally   GI: Non-tender. Non-distended. Normal bowel sounds. Skin: Warm and dry. No nodule on exposed extremities. No rash on exposed extremities. M/S: No cyanosis. No joint deformity. No clubbing. Neuro: Awake. Grossly nonfocal    Psych: Oriented x 3. No anxiety or agitation. Lab Results   Component Value Date    WBC 12.8 (H) 12/03/2022    HGB 12.2 12/03/2022    HCT 35.8 (L) 12/03/2022    MCV 90.4 12/03/2022     12/03/2022     Lab Results   Component Value Date     12/04/2022    K 4.0 12/04/2022     12/04/2022    CO2 26 12/04/2022    BUN 25 (H) 12/04/2022    CREATININE 1.1 12/04/2022    GLUCOSE 170 (H) 12/04/2022    CALCIUM 10.0 12/04/2022    PROT 6.8 12/03/2022    LABALBU 4.5 12/03/2022    BILITOT 0.4 12/03/2022    ALKPHOS 57 12/03/2022    AST 14 (L) 12/03/2022    ALT 11 12/03/2022    LABGLOM 52 (A) 12/04/2022    GFRAA >60 06/24/2022    AGRATIO 2.0 12/03/2022    GLOB 3.2 05/27/2016       CULTURES  COVID and Flu not detected     RADIOLOGY  XR CHEST PORTABLE   Final Result   Cardiomegaly with increasing interstitial change. Pattern may represent   pulmonary edema or chronic interstitial lung disease. Relative stability of   incompletely characterized mass in the left lower lung zone since 2016. Lack   of significant change would imply a benign granuloma.                Discharge Medications     Medication List        ASK your doctor about these medications      aspirin 81 MG EC tablet     atenolol 25 MG tablet  Commonly known as: TENORMIN  TAKE 1 TABLET DAILY     atorvastatin 40 MG tablet  Commonly known as: LIPITOR  TAKE 1 TABLET DAILY     cyanocobalamin 1000 MCG tablet  Commonly known as: CVS VITAMIN B12  Take 1 tablet by mouth daily     furosemide 20 MG tablet  Commonly known as: LASIX  2 or 3 qd for swelling     glipiZIDE 10 MG tablet  Commonly known as: GLUCOTROL  TAKE 2 TABLETS TWICE A DAY BEFORE MEALS     Januvia 50 MG tablet  Generic drug: SITagliptin  TAKE 1 TABLET DAILY     lisinopril-hydroCHLOROthiazide 20-12.5 MG per tablet  Commonly known as: PRINZIDE;ZESTORETIC  TAKE 1 TABLET TWICE A DAY     metFORMIN 1000 MG tablet  Commonly known as: GLUCOPHAGE  TAKE 1 TABLET TWICE A DAY WITH MEALS     Vitamin C  MG Tbcr     Vitamin D 1000 units Caps capsule  Commonly known as: CHOLECALCIFEROL     vitamin E 400 UNIT capsule  Take 1 capsule by mouth 2 times daily                Discharged in stable condition to [facility]     Follow Up:   Follow up with physician at [facility], [place]  12/05/22  8:56 AM

## 2022-12-05 NOTE — PROGRESS NOTES
Bedside report given to [facility]. Pt. denies further needs at this time. Call light is within reach.   Obdulia Garcia RN

## 2022-12-05 NOTE — PRE SEDATION
Brief Pre-Op Note/Sedation Assessment      Juve Doctor  1945  [id number]  1:41 PM    Planned Procedure: Cardiac Catheterization Procedure  Post Procedure Plan: Return to same level of care  Consent: I have discussed with the patient and/or the patient representative the indication, alternatives, and the possible risks and/or complications of the planned procedure and the anesthesia methods. The patient and/or patient representative appear to understand and agree to proceed. Chief Complaint:   NSTEMI, HOCM, severe mitral regurgitation    Indications for Cath Procedure:  Presentation:  ACS > 24 hrs and Suspected CAD  2. Anginal Classification within 2 weeks:  No symptoms CCS III - Symptoms with everyday living activities, i.e., moderate limitation  3. Angina Symptoms Assessment:  Atypical Chest Pain  4. Heart Failure Class within last 2 weeks:  Yes:  Heart Failure Type: Diastolic Severity:  Class III - Symptoms of HF on less-than-ordinary exertion  5. Cardiovascular Instability:  No    Prior Ischemic Workup/Eval:  Pre-Procedural Medications: Yes: Aspirin, Beta Blockers, and STATIN  2. Stress Test Completed? No    Does Patient need surgery? Cath Valve Surgery:  Yes:  Mitral Regurgitation; Regurgitation Severity: Severe (4+) Intermediate < 4 METS    Pre-Procedure Medical History:  Vital Signs: There were no vitals taken for this visit. Allergies:     Allergies   Allergen Reactions    Actos [Pioglitazone Hydrochloride]     Levaquin [Levofloxacin]      Medications:    Current Facility-Administered Medications   Medication Dose Route Frequency Provider Last Rate Last Admin    sodium chloride flush 0.9 % injection 5-40 mL  5-40 mL IntraVENous 2 times per day Camden Haddox, DO        sodium chloride flush 0.9 % injection 5-40 mL  5-40 mL IntraVENous PRN Camden Haddox, DO        0.9 % sodium chloride infusion   IntraVENous PRN Camden Haddox, DO        acetaminophen (TYLENOL) tablet 650 mg  650 mg Oral Q4H PRN Camden Haddox, DO        aspirin EC tablet 81 mg  81 mg Oral Daily Joie Locke, MD        atenolol (TENORMIN) tablet 25 mg  25 mg Oral Daily Joie Medicine, MD        atorvastatin (LIPITOR) tablet 40 mg  40 mg Oral Daily Joie Medicine, MD        furosemide (LASIX) tablet 40 mg  40 mg Oral Daily Joie Medicine, MD        sodium chloride flush 0.9 % injection 5-40 mL  5-40 mL IntraVENous 2 times per day Joie Locke MD        sodium chloride flush 0.9 % injection 5-40 mL  5-40 mL IntraVENous PRN Joie Locke, MD        0.9 % sodium chloride infusion   IntraVENous PRN Joie Locke MD        enoxaparin (LOVENOX) injection 40 mg  40 mg SubCUTAneous Daily Joie Locke MD        ondansetron (ZOFRAN-ODT) disintegrating tablet 4 mg  4 mg Oral Q8H PRN Joie Locke MD        Or    ondansetron [facility]) injection 4 mg  4 mg IntraVENous Q6H PRN Joie Locke MD        polyethylene glycol (GLYCOLAX) packet 17 g  17 g Oral Daily PRN Joie Locke MD        acetaminophen (TYLENOL) tablet 650 mg  650 mg Oral Q6H PRN Joie Locke MD        Or    acetaminophen (TYLENOL) suppository 650 mg  650 mg Rectal Q6H PRN Joie Locke MD        glucose chewable tablet 16 g  4 tablet Oral PRN Joie Locke MD        dextrose bolus 10% 125 mL  125 mL IntraVENous PRN Joie Locke MD        Or    dextrose bolus 10% 250 mL  250 mL IntraVENous PRN Joie Locke MD        glucagon (rDNA) injection 1 mg  1 mg SubCUTAneous PRN Joie Locke MD        dextrose 10 % infusion   IntraVENous Continuous PRN Joie Locke MD        insulin lispro (HUMALOG) injection vial 0-4 Units  0-4 Units SubCUTAneous TID WC Joie Locke MD        insulin lispro (HUMALOG) injection vial 0-4 Units  0-4 Units SubCUTAneous Nightly Joie Locke MD           Past Medical History:    Past Medical History:   Diagnosis Date    CTS (carpal tunnel syndrome)     [facility]     foot 4/07, micral 4/07    Elevated LFT's HTN     Hyperlipidemia     neg gxt 11/00    Lung mass     stable    Peripheral neuropathy     Protein in urine        Surgical History:    Past Surgical History:   Procedure Laterality Date    HYSTERECTOMY (CERVIX STATUS UNKNOWN)      LUMBAR DISC SURGERY      TRE AND BSO (CERVIX REMOVED)               Pre-Sedation:  Pre-Sedation Documentation and Exam:  I have assessed the patient and reviewed the H&P on the chart. Prior History of Anesthesia Complications:   none    Modified Mallampati:  II (soft palate, uvula, fauces visible)    ASA Classification:  Class 3 - A patient with severe systemic disease that limits activity but is not incapacitating    Sarah Scale: Activity:  2 - Able to move 4 extremities voluntarily on command  Respiration:  2 - Able to breathe deeply and cough freely  Circulation:  2 - BP+/- 20mmHg of normal  Consciousness:  2 - Fully awake  Oxygen Saturation (color):  2 - Able to maintain oxygen saturation >92% on room air    Sedation/Anesthesia Plan:  Guard the patient's safety and welfare. Minimize physical discomfort and pain. Minimize negative psychological responses to treatment by providing sedation and analgesia and maximize the potential amnesia. Patient to meet pre-procedure discharge plan.     Medication Planned:  midazolam intravenously and fentanyl intravenously    Patient is an appropriate candidate for plan of sedation:   yes      Electronically signed by Alonso Bedolla DO on 12/5/2022 at 1:41 PM

## 2022-12-05 NOTE — FLOWSHEET NOTE
Shift assessment completed. Patient awake in bed visiting with family. A/Ox4. VSS. PM medications given. Patient denies any pain or any further needs at this time. Call light and bedside table within reach.

## 2022-12-05 NOTE — FLOWSHEET NOTE
12/05/22 0820   Vital Signs   Temp 97.3 °F (36.3 °C)   Temp Source Oral   Heart Rate 73   Heart Rate Source Monitor   Resp 16   BP (!) 142/71   MAP (Calculated) 95   BP Location Left upper arm   BP Method Automatic   Patient Position Semi fowlers   Level of Consciousness 0   MEWS Score 1   Pain Assessment   Pain Assessment None - Denies Pain   Oxygen Therapy   SpO2 97 %   O2 Device None (Room air)     AM assessment complete. Pt a&o x4. Denies any pain or discomfort. Denies any sob or cough. No edema noted. NPO since midnight. Set to got to cath lab this am. Call light and bedside table within reach. Will continue to monitor.

## 2022-12-05 NOTE — PROGRESS NOTES
Pt taken [facility] to [facility] in [facility] condition. Report given to transport team as well as Inés Dumas at [facility] cath lab. All belonging sent with pt.

## 2022-12-05 NOTE — PROGRESS NOTES
Physician Progress Note      PATIENT:               Ashu Taveras  CSN #:                  [id number]  :                       1945  ADMIT DATE:       12/3/2022 7:54 AM  DISCH DATE:  RESPONDING  PROVIDER #:        Roderick Roblero MD          QUERY TEXT:    Pt admitted with SOB/chest pains and has CHF documented. If possible, please   document in progress notes and discharge summary further specificity regarding   the type of CHF:    The medical record reflects the following:  Risk Factors: AFib, hypertrophic CM, severe MR  Clinical Indicators: H&P-\" Acute congestive heart failure\" , ProBNP 2923  Echo 11/10/22- EF 65% hyperdynamic, gr 2 DD, severe MR  CXR 12/3-Cardiomegaly with increasing interstitial change. Pattern may   represent pulmonary edema or chronic interstitial lung disease. R  Treatment: Cardiology consult, Digoxin, Cardizem. Lopressor, EKG/labs and   transfer for heart cath for further evaluation of lateral ST changes on EKG   for CAD possibly    Thank you, Wilfred Yeung RN, BSN  [email or web address]  Options provided:  -- Acute Diastolic CHF/HFpEF  -- Other - I will add my own diagnosis  -- Disagree - Not applicable / Not valid  -- Disagree - Clinically unable to determine / Unknown  -- Refer to Clinical Documentation Reviewer    PROVIDER RESPONSE TEXT:    This patient is in acute diastolic CHF/HFpEF.     Query created by: Kendall Alaniz on 2022 4:38 PM      Electronically signed by:  Roderick Roblero MD 2022 10:09 PM

## 2022-12-05 NOTE — PROGRESS NOTES
[address]   Progress Note  Cardiology      HPI: Seeing Ms. Salas today for f/u afib RVR, HCM, CHF, and MR. She feels better today and denies complaints. Overnight had issues with HR dropping into 30's with quick recovery. Tele reviewed afib 74bpm currently. Physical Examination:    Vitals:    12/05/22 0304   BP: (!) 132/56   Pulse: 68   Resp: 16   Temp: 97.8 °F (36.6 °C)   SpO2: 96%          Constitutional and General Appearance: NAD   Respiratory:  Normal excursion and expansion without use of accessory muscles  Resp Auscultation: Normal breath sounds without dullness  Cardiovascular: The apical impulses not displaced  Heart tones are crisp and normal; +irregular  Cervical veins are not engorged  The carotid upstroke is normal in amplitude and contour without delay or bruit  Normal S1S2, No S3, +II/VI holosystolic murmur  Peripheral pulses are symmetrical and full  There is no clubbing, cyanosis of the extremities.   No edema  Pedal Pulses: 2+ and equal   Abdomen:  No masses or tenderness  Liver/Spleen: No Abnormalities Noted  Neurological/Psychiatric:  Alert and oriented in all spheres  Moves all extremities well  No abnormalities of mood, affect, memory, mentation, or behavior are noted  Skin: warm and dry        Lab Results   Component Value Date    WBC 12.8 (H) 12/03/2022    HGB 12.2 12/03/2022    HCT 35.8 (L) 12/03/2022    MCV 90.4 12/03/2022     12/03/2022     Lab Results   Component Value Date    CREATININE 1.1 12/04/2022    BUN 25 (H) 12/04/2022     12/04/2022    K 4.0 12/04/2022     12/04/2022    CO2 26 12/04/2022     Lab Results   Component Value Date    INR 1.06 12/04/2022    PROTIME 13.7 12/04/2022          [id number] Score for Atrial Fibrillation Stroke Risk    Risk   Factors   Component Value   C CHF No 0   H HTN Yes 1   A2 Age >= 76 Yes,  (78 y.o.) 2   D DM Yes 1   S2 Prior Stroke/TIA No 0   V Vascular Disease No 0   A Age 74-69 No,  (78 y.o.) 0   Sc Sex female 1     [id number]  Score   5   Score last updated 12/4/22 8:00 AM EST        ECHO 11/10/22 Summary   Left ventricle size is normal with severe concentric left ventricular hypertrophy. Left ventricular systolic function is hyperdynamic with ejection fraction   estimated at greater than 65%. No regional wall motion abnormalities are noted. Grade II diastolic dysfunction with elevated filling pressure. There is a late peaking LV outflow tract gradient of 135 mmHg consistent   with resting LVOT obstruction. Gradient increases to 160 mmHg with valsalva maneuver. The right ventricle is normal in size and function. The left atrium is severely dilated. There is systolic anterior motion of the mitral valve with consequent severe   eccentric mitral regurgitation. Mild tricuspid regurgitation. Normal systolic pulmonary artery pressure (SPAP) estimated at 37 mmHg (RA   pressure 3 mmHg). Compared with the prior study performed 4-8-2011 (EF65%, LVH, DD1, YEIMY, mod   MR, LVOT obstruction mean PG 47 mmHg), the LVEF remains hyperdynamic,   resting LVOT obstruction has worsened, with the degree of mitral   regurgitation now noted severe. Assessment:  Michelle Olson is a 69 y.o. patient who presented to [facility] 12/3/22 with c/o palpitations and SOB. She has PMH DM, HTN, HLD, and carpal tunnel syndrome. Reports hx murmur but unaware of any cardiac diagnosis. ECHO 11/10/22 EF>65%; grade II DD elevated filling pressure; YEIMY with severe MR; LVOT obstruction (rest 135 increased 160 with valsalva); mild TR; SPAP 37. Due to ECHO findings PCP set up cards appt for 1/23 with Dr. Bryce Dickerson. Now presents with c/o palps and SOB. She is vague historian but symptoms present for at least last few months. Reports ROBLEDO with stairs and carrying things which is new and she felt related to age. Also c/o palps felt at nighttime laying in bed. Intermittent palps randomly for months.  Day of admit awakened with much worse racing HR assoc with SOB and chest pressure causing ER visit. EKG afib ; LVH; ST change lateral leads consider ischemia (NSR in 3/11). CXR cardiomegaly with increasing IS change; pulm edema vs chronic ILD. Labs: HUT=6033; Wero < 0.01, 0.23, 0.45; BUN/Cr=25/1.1; K+ 4.0; H/H=12.2/36.8; WBC=12.8; INR 1.06; negative flu/Covid. Dilt gtt started but HR decreased and had some pauses and d/c'd. P              Diagnosis of afib RVR, elevated Wero, CHF, and HCM in elderly female. Recs;  1. ECHO 11/22 with HCM with pronounced LVOT gradients and YEIMY with severe MR. Findings worse compared to 2011 study. She has not followed with cardiology and only aware of murmur. 2. Elevated Wero may be related to demand ischemia from tachycardia but lateral ST changes on EKG and CAD needs to be considered. 3. Continue lovenox full dose for AC given afib and elevated Wero. Holding AM dose pending cath. 4. Continue metoprolol for HR control and HCM. Will decrease 12.5mg BID given lower HR's overnight and follow tele and adjust accordingly. 5. Received 2 doses IV digoxin overnight. Hold on further dose if able. 6. Continue baby asa and lipitor 40mg qd.  7. Case d/w interventional partner and will send for RHC/LHC today. Once results known further testing (ie. Possible LORNA and/or cardiac MRI) will be considered and future mgt plans made. Not straightforward medical issues and will need discussion.      Patient Active Problem List   Diagnosis    Protein in urine    CTS (carpal tunnel syndrome)    Lung mass    Vitamin D deficiency    Severe mitral regurgitation    Elevated LFTs    Osteopenia    Type 2 diabetes mellitus with diabetic polyneuropathy, without long-term current use of insulin (HCC)    Essential hypertension    Hypercholesteremia    Cervical spine disease    Gastroesophageal reflux disease without esophagitis    Lower extremity edema    Arthritis    Vitamin B12 deficiency    Idiopathic progressive neuropathy    LVH (left ventricular hypertrophy)    Atrial fibrillation with RVR (HCC)    Hypertrophic cardiomyopathy (HCC)    Acute congestive heart failure (HCC)    Elevated troponin

## 2022-12-06 ENCOUNTER — ANESTHESIA (OUTPATIENT)
Dept: CARDIAC CATH/INVASIVE PROCEDURES | Age: 77
DRG: 282 | End: 2022-12-06
Payer: MEDICARE

## 2022-12-06 ENCOUNTER — APPOINTMENT (OUTPATIENT)
Dept: CARDIAC CATH/INVASIVE PROCEDURES | Age: 77
DRG: 282 | End: 2022-12-06
Attending: INTERNAL MEDICINE
Payer: MEDICARE

## 2022-12-06 ENCOUNTER — ANESTHESIA EVENT (OUTPATIENT)
Dept: CARDIAC CATH/INVASIVE PROCEDURES | Age: 77
DRG: 282 | End: 2022-12-06
Payer: MEDICARE

## 2022-12-06 LAB
ALBUMIN SERPL-MCNC: 4 G/DL (ref 3.4–5)
ANION GAP SERPL CALCULATED.3IONS-SCNC: 8 MMOL/L (ref 3–16)
BUN BLDV-MCNC: 18 MG/DL (ref 7–20)
CALCIUM SERPL-MCNC: 9.2 MG/DL (ref 8.3–10.6)
CHLORIDE BLD-SCNC: 110 MMOL/L (ref 99–110)
CO2: 23 MMOL/L (ref 21–32)
CREAT SERPL-MCNC: 0.9 MG/DL (ref 0.6–1.2)
GFR SERPL CREATININE-BSD FRML MDRD: >60 ML/MIN/{1.73_M2}
GLUCOSE BLD-MCNC: 172 MG/DL (ref 70–99)
GLUCOSE BLD-MCNC: 195 MG/DL (ref 70–99)
GLUCOSE BLD-MCNC: 216 MG/DL (ref 70–99)
GLUCOSE BLD-MCNC: 221 MG/DL (ref 70–99)
GLUCOSE BLD-MCNC: 273 MG/DL (ref 70–99)
HCT VFR BLD CALC: 32.1 % (ref 36–48)
HEMOGLOBIN: 10.8 G/DL (ref 12–16)
MCH RBC QN AUTO: 31.4 PG (ref 26–34)
MCHC RBC AUTO-ENTMCNC: 33.8 G/DL (ref 31–36)
MCV RBC AUTO: 93 FL (ref 80–100)
PDW BLD-RTO: 14 % (ref 12.4–15.4)
PERFORMED ON: ABNORMAL
PHOSPHORUS: 3.2 MG/DL (ref 2.5–4.9)
PLATELET # BLD: 206 K/UL (ref 135–450)
PMV BLD AUTO: 9 FL (ref 5–10.5)
POTASSIUM SERPL-SCNC: 4.1 MMOL/L (ref 3.5–5.1)
RBC # BLD: 3.45 M/UL (ref 4–5.2)
SODIUM BLD-SCNC: 141 MMOL/L (ref 136–145)
WBC # BLD: 6.3 K/UL (ref 4–11)

## 2022-12-06 PROCEDURE — 93312 ECHO TRANSESOPHAGEAL: CPT | Performed by: INTERNAL MEDICINE

## 2022-12-06 PROCEDURE — 93315 ECHO TRANSESOPHAGEAL: CPT

## 2022-12-06 PROCEDURE — 2500000003 HC RX 250 WO HCPCS

## 2022-12-06 PROCEDURE — B24BZZ4 ULTRASONOGRAPHY OF HEART WITH AORTA, TRANSESOPHAGEAL: ICD-10-PCS | Performed by: INTERNAL MEDICINE

## 2022-12-06 PROCEDURE — 6360000002 HC RX W HCPCS: Performed by: INTERNAL MEDICINE

## 2022-12-06 PROCEDURE — 2580000003 HC RX 258: Performed by: INTERNAL MEDICINE

## 2022-12-06 PROCEDURE — 93325 DOPPLER ECHO COLOR FLOW MAPG: CPT

## 2022-12-06 PROCEDURE — 2060000000 HC ICU INTERMEDIATE R&B

## 2022-12-06 PROCEDURE — 85027 COMPLETE CBC AUTOMATED: CPT

## 2022-12-06 PROCEDURE — 6370000000 HC RX 637 (ALT 250 FOR IP): Performed by: INTERNAL MEDICINE

## 2022-12-06 PROCEDURE — 2580000003 HC RX 258

## 2022-12-06 PROCEDURE — 3700000001 HC ADD 15 MINUTES (ANESTHESIA)

## 2022-12-06 PROCEDURE — 80069 RENAL FUNCTION PANEL: CPT

## 2022-12-06 PROCEDURE — 36415 COLL VENOUS BLD VENIPUNCTURE: CPT

## 2022-12-06 PROCEDURE — 6360000002 HC RX W HCPCS

## 2022-12-06 PROCEDURE — 3700000000 HC ANESTHESIA ATTENDED CARE

## 2022-12-06 PROCEDURE — 93320 DOPPLER ECHO COMPLETE: CPT

## 2022-12-06 RX ORDER — LIDOCAINE HYDROCHLORIDE 20 MG/ML
INJECTION, SOLUTION EPIDURAL; INFILTRATION; INTRACAUDAL; PERINEURAL PRN
Status: DISCONTINUED | OUTPATIENT
Start: 2022-12-06 | End: 2022-12-06 | Stop reason: SDUPTHER

## 2022-12-06 RX ORDER — PROPOFOL 10 MG/ML
INJECTION, EMULSION INTRAVENOUS PRN
Status: DISCONTINUED | OUTPATIENT
Start: 2022-12-06 | End: 2022-12-06 | Stop reason: SDUPTHER

## 2022-12-06 RX ORDER — PHENYLEPHRINE HCL IN 0.9% NACL 1 MG/10 ML
SYRINGE (ML) INTRAVENOUS PRN
Status: DISCONTINUED | OUTPATIENT
Start: 2022-12-06 | End: 2022-12-06 | Stop reason: SDUPTHER

## 2022-12-06 RX ORDER — SODIUM CHLORIDE, SODIUM LACTATE, POTASSIUM CHLORIDE, CALCIUM CHLORIDE 600; 310; 30; 20 MG/100ML; MG/100ML; MG/100ML; MG/100ML
INJECTION, SOLUTION INTRAVENOUS CONTINUOUS PRN
Status: DISCONTINUED | OUTPATIENT
Start: 2022-12-06 | End: 2022-12-06 | Stop reason: SDUPTHER

## 2022-12-06 RX ADMIN — INSULIN LISPRO 2 UNITS: 100 INJECTION, SOLUTION INTRAVENOUS; SUBCUTANEOUS at 16:56

## 2022-12-06 RX ADMIN — Medication 10 ML: at 21:33

## 2022-12-06 RX ADMIN — PROPOFOL 10 MG: 10 INJECTION, EMULSION INTRAVENOUS at 11:03

## 2022-12-06 RX ADMIN — ENOXAPARIN SODIUM 70 MG: 100 INJECTION SUBCUTANEOUS at 21:33

## 2022-12-06 RX ADMIN — ASPIRIN 81 MG: 81 TABLET, COATED ORAL at 09:22

## 2022-12-06 RX ADMIN — PROPOFOL 10 MG: 10 INJECTION, EMULSION INTRAVENOUS at 10:55

## 2022-12-06 RX ADMIN — FUROSEMIDE 40 MG: 40 TABLET ORAL at 12:08

## 2022-12-06 RX ADMIN — PROPOFOL 10 MG: 10 INJECTION, EMULSION INTRAVENOUS at 11:00

## 2022-12-06 RX ADMIN — Medication 80 MCG: at 11:08

## 2022-12-06 RX ADMIN — PROPOFOL 10 MG: 10 INJECTION, EMULSION INTRAVENOUS at 11:08

## 2022-12-06 RX ADMIN — LIDOCAINE HYDROCHLORIDE 100 MG: 20 INJECTION, SOLUTION EPIDURAL; INFILTRATION; INTRACAUDAL; PERINEURAL at 10:46

## 2022-12-06 RX ADMIN — PROPOFOL 50 MG: 10 INJECTION, EMULSION INTRAVENOUS at 10:46

## 2022-12-06 RX ADMIN — Medication 10 ML: at 09:25

## 2022-12-06 RX ADMIN — PROPOFOL 30 MG: 10 INJECTION, EMULSION INTRAVENOUS at 10:49

## 2022-12-06 RX ADMIN — Medication 60 MCG: at 11:03

## 2022-12-06 RX ADMIN — PROPOFOL 10 MG: 10 INJECTION, EMULSION INTRAVENOUS at 11:05

## 2022-12-06 RX ADMIN — ATORVASTATIN CALCIUM 40 MG: 40 TABLET, FILM COATED ORAL at 09:23

## 2022-12-06 RX ADMIN — ENOXAPARIN SODIUM 70 MG: 100 INJECTION SUBCUTANEOUS at 09:23

## 2022-12-06 RX ADMIN — SODIUM CHLORIDE, SODIUM LACTATE, POTASSIUM CHLORIDE, AND CALCIUM CHLORIDE: .6; .31; .03; .02 INJECTION, SOLUTION INTRAVENOUS at 10:30

## 2022-12-06 RX ADMIN — ATENOLOL 25 MG: 25 TABLET ORAL at 09:23

## 2022-12-06 RX ADMIN — Medication 80 MCG: at 10:56

## 2022-12-06 RX ADMIN — PROPOFOL 20 MG: 10 INJECTION, EMULSION INTRAVENOUS at 10:52

## 2022-12-06 ASSESSMENT — ENCOUNTER SYMPTOMS: SHORTNESS OF BREATH: 1

## 2022-12-06 NOTE — PROCEDURES
LORNA Findings:  Conclusions   Ejection fraction is visually estimated to be 55-60%. There is severe concentric LVH. Turbulent color flow noted in LV outflow tract. Normal RV size and systolic function. The left atrium is dilated. There is no evidence of mass or thrombus in the left atrium or appendage. Mild thickening of leaflets of mitral valve. There appears to be mild prolapse of the anterior mitral valve leaflet. No systolic anterior motion is appreciated. There is at least moderate eccentric and posteriorly directed mitral   regurgitation. The severity of regurgitation is difficult to fully quantify   due to the eccentricity of the regurgitant jet. Recommend obtaining cardiac   MRI for further evaluation. Mild tricuspid and pulmonic regurgitation. A bubble study was performed and shows no evidence of a right-to-left atrial   level shunt. Recommendations:  1. Recommend obtaining outpatient cardiac MRI to further characterize myocardium, evaluate degree of LVOT obstruction, assess for septal scarring, and help further define severity of mitral regurgitation. 2. Continue aspirin 81 mg daily, atorvastatin 40 mg daily, atenolol 25 mg daily, and lasix 40 mg daily. Need to continue to be cautious with diuretic and avoid dropping preload too drastically. 3. Can transition from [address] Lovenox to Eliquis 5 mg BID prior to discharge. 4. Given episodes of transient a-fib with slow ventricular response, we are limited in ability to further uptitrate beta-blocker or add calcium channel blocker. 5. Consider future trial of disopyramide, but will plan to assess degree of septal scarring present on cardiac MRI prior to initiating given potential for increased risk of ventricular arrhythmias. 6. Refer to structural heart team at [facility] and pending results of above work-up and clinical course, may be future candidate for septal ablation versus myomectomy.       Lida Buerger, [facility]

## 2022-12-06 NOTE — DISCHARGE INSTRUCTIONS
CARDIAC MRI AT [address] [place] [address] have a cardiac MRI scheduled at the [address] on January 18th 2023 you need to arrive at 3:45pm for a 4:15pm test.  The test takes up to 2 hours and 15 minutes to complete. If you have questions please call [facility] [phone]. Do not apply ointment, lotion or sprays the morning of the test.  No Caffeine 24 hour prior that include drinks, food, candy and medications. Do not eat or drink 4 hours prior to exam expect a sip of water to take your medications as usual.  If you are claustrophobic or have metal in your body please notify the . If you have any implants please bring the cards with you. Also bring you insurance card and photo ID. You will need to lay still for the procedure. Bring a list of your medications. Do not wear any metal, loose fitting dentures or hair pins. Please call please call [facility] Schedule department on [phone] once discharged from [facility] as they have some further questions to ask you personally regarding the cardiac MRI you are scheduled for. Thank You       Structural Heart Team Appointment At The [address] [place] [address] have Appointment an with Structural heart team Dr Tarun Felder on January 25th 2023 at 11:00am scheduled at the [address]. If you have questions please call [facility] [phone]. FOLLOW-UP APPOINTMENTS    [place] OFFICE - Follow-up appointment on January 30th at 2:45pm with Dr Afua Bland. Please arrive 15 minutes early to complete necessary paperwork. [facility] Office [address]:  [phone]. If you are unable to make this appointment, please call to reschedule [phone]. To get to the office go to the side of the hospital at [facility], enter at the [facility], entrance that faces [address].    Take the elevator to the 3rd floor turn right off elevator then take hallway to the right. Go down the tillman and office will be on your right [address].

## 2022-12-06 NOTE — CARE COORDINATION
CASE MANAGEMENT INITIAL ASSESSMENT      Reviewed chart and completed assessment with patient:Pt  Family present: None  Explained Case Management role/services. Yes    Primary contact information:Pt's  [facility] Decision Maker :   Primary Decision Maker: Stephani Zendejas - [phone]          Admit date/status:12/5/22  Diagnosis:NSTEMI, Mitral regurgitation    Is this a Readmission?:  No      Insurance:Medicare    Precert required for SNF: No       3 night stay required: Yes    Living arrangements, Adls, care needs, prior to admission:Pt from home with , independent of ADL'S     Durable Medical Equipment at home:  Walker__Cane__RTS__ BSC__Shower Chair__  02__ HHN__ CPAP__  BiPap__  Hospital Bed__ W/C___ Other_No DME Use ____    Services in the home and/or outpatient, prior to admission:None    Current PCP:   Benny West MD            Medications: Prescription coverage? Yes Will pt require financial assistance with medications No     Transportation needs: Family will likely transport      Dialysis Facility (if applicable) NA  Name:  Address:  Dialysis Schedule:  Phone:  Fax:    PT/OT recs:Not yet ordered     Hospital Exemption Notification (HEN):Needed for SNF     Barriers to discharge:None    Plan/comments 12/6/22 Pt from home with , Oscar Arthur. Pt going for LORNA today pending the results possible CT surgery consult/ structural heart team eval. Will follow.       ECOC on chart for MD signature

## 2022-12-06 NOTE — ANESTHESIA PRE PROCEDURE
Department of Anesthesiology  Preprocedure Note       Name:  Georgina Irene   Age:  69 y.o.  :  1945                                          MRN:  [id number]         Date:  2022      Surgeon: * Surgery not found *    Procedure:     Medications prior to admission:   Prior to Admission medications    Medication Sig Start Date End Date Taking? Authorizing Provider   metFORMIN (GLUCOPHAGE) 1000 MG tablet TAKE 1 TABLET TWICE A DAY WITH MEALS 11/15/22   Michalene Cheadle, MD   furosemide (LASIX) 20 MG tablet 2 or 3 qd for swelling 10/25/22   Michalene Cheadle, MD   JANUVIA 50 MG tablet TAKE 1 TABLET DAILY 22   Michalene Cheadle, MD   atenolol (TENORMIN) 25 MG tablet TAKE 1 TABLET DAILY 22   Michalene Cheadle, MD   atorvastatin (LIPITOR) 40 MG tablet TAKE 1 TABLET DAILY 22   Michalene Cheadle, MD   lisinopril-hydroCHLOROthiazide (PRINZIDE;ZESTORETIC) 20-12.5 MG per tablet TAKE 1 TABLET TWICE A DAY  Patient taking differently: 1 tablet daily 10/19/21   Michalene Cheadle, MD   glipiZIDE (GLUCOTROL) 10 MG tablet TAKE 2 TABLETS TWICE A DAY BEFORE MEALS  Patient taking differently: 5 mg 2 times daily (before meals) TAKE 2 TABLETS TWICE A DAY BEFORE MEALS 21   Michalene Cheadle, MD   cyanocobalamin ([employer] VITAMIN B12) 1000 MCG tablet Take 1 tablet by mouth daily 3/11/19   Michalene Cheadle, MD   vitamin E 400 UNIT capsule Take 1 capsule by mouth 2 times daily  Patient taking differently: Take 400 Units by mouth daily 3/11/19   Michalene Cheadle, MD   Vitamin D (CHOLECALCIFEROL) 1000 UNITS CAPS capsule Take 1,000 Units by mouth daily 4/28/15   Michalene Cheadle, MD   Ascorbic Acid (VITAMIN C CR) 500 MG TBCR Take  by mouth. Historical Provider, MD   aspirin 81 MG EC tablet Take 81 mg by mouth daily.     Historical Provider, MD       Current medications:    Current Facility-Administered Medications   Medication Dose Route Frequency Provider Last Rate Last Admin    sodium chloride flush 0.9 % injection 5-40 mL  5-40 mL IntraVENous 2 times per day Camden Haddox, DO   10 mL at 12/06/22 0925    sodium chloride flush 0.9 % injection 5-40 mL  5-40 mL IntraVENous PRN Camden Haddox, DO        0.9 % sodium chloride infusion   IntraVENous PRN Camden Haddox, DO        acetaminophen (TYLENOL) tablet 650 mg  650 mg Oral Q4H PRN Camden Lunadox, DO        aspirin EC tablet 81 mg  81 mg Oral Daily Eddy Martin MD   81 mg at 12/06/22 8962    atenolol (TENORMIN) tablet 25 mg  25 mg Oral Daily Eddy Martin MD   25 mg at 12/06/22 9908    atorvastatin (LIPITOR) tablet 40 mg  40 mg Oral Daily Eddy Martin MD   40 mg at 12/06/22 3895    furosemide (LASIX) tablet 40 mg  40 mg Oral Daily Eddy Martin MD        sodium chloride flush 0.9 % injection 5-40 mL  5-40 mL IntraVENous 2 times per day Eddy Martin MD   10 mL at 12/06/22 0925    sodium chloride flush 0.9 % injection 5-40 mL  5-40 mL IntraVENous PRN Eddy Martin MD        0.9 % sodium chloride infusion   IntraVENous PRN Eddy Martin MD        ondansetron (ZOFRAN-ODT) disintegrating tablet 4 mg  4 mg Oral Q8H PRN Eddy Martin MD        Or    ondansetron [facility]) injection 4 mg  4 mg IntraVENous Q6H PRN Eddy Martin MD        polyethylene glycol [facility]) packet 17 g  17 g Oral Daily PRN Eddy Martin MD        acetaminophen (TYLENOL) tablet 650 mg  650 mg Oral Q6H PRN Eddy Martin MD        Or   [facility] acetaminophen (TYLENOL) suppository 650 mg  650 mg Rectal Q6H PRN Eddy Martin MD        glucose chewable tablet 16 g  4 tablet Oral PRN Eddy Martin MD        dextrose bolus 10% 125 mL  125 mL IntraVENous PRN Eddy Martin MD        Or    dextrose bolus 10% 250 mL  250 mL IntraVENous PRN Eddy Martin MD        glucagon (rDNA) injection 1 mg  1 mg SubCUTAneous PRN Eddy Martin MD        dextrose 10 % infusion   IntraVENous Continuous PRN Eddy Martin MD        insulin lispro (HUMALOG) injection vial 0-4 Units  0-4 Units SubCUTAneous TID WC Geronimo Hays MD        insulin lispro (HUMALOG) injection vial 0-4 Units  0-4 Units SubCUTAneous Nightly Geronimo Hays MD        enoxaparin (LOVENOX) injection 70 mg  1 mg/kg SubCUTAneous BID Camden DO Chioma   70 mg at 12/06/22 4030       Allergies:     Allergies   Allergen Reactions    Actos [Pioglitazone Hydrochloride]     Levaquin [Levofloxacin]        Problem List:    Patient Active Problem List   Diagnosis Code    DM (diabetes mellitus) E11.9    Protein in urine R80.9    CTS (carpal tunnel syndrome) G56.00    Lung mass R91.8    Vitamin D deficiency E55.9    Severe mitral regurgitation I34.0    Elevated TSH R79.89    Osteopenia M85.80    Type 2 diabetes mellitus with diabetic polyneuropathy, without long-term current use of insulin ([facility]) E11.42    Essential hypertension I10    Hypercholesteremia E78.00    Cervical spine disease M48.9    Gastroesophageal reflux disease without esophagitis K21.9    Lower extremity edema R60.0    Arthritis M19.90    Vitamin B12 deficiency E53.8    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    LVH (left ventricular hypertrophy) I51.7    Atrial fibrillation with RVR ([facility]) I48.91    Hypertrophic cardiomyopathy ([facility]) I42.2    Acute congestive heart failure ([facility]) I50.9    Elevated troponin R77.8    Shortness of breath R06.02    NSTEMI (non-ST elevated myocardial infarction) ([facility]) I21.4    Nonrheumatic mitral valve regurgitation I34.0    HOCM (hypertrophic obstructive cardiomyopathy) ([facility]) I42.1       Past Medical History:        Diagnosis Date    CTS (carpal tunnel syndrome)     DM (diabetes mellitus) ([facility] [address]     foot 4/07, micral 4/07    Elevated LFT's     HTN     Hyperlipidemia     neg gxt 11/00    Lung mass     stable    Peripheral neuropathy     Protein in urine        Past Surgical History:        Procedure Laterality Date    HYSTERECTOMY (CERVIX STATUS UNKNOWN)      LUMBAR DISC SURGERY      TRE AND BSO (CERVIX REMOVED)         Social History:    Social History     Tobacco Use    Smoking status: Former     Packs/day: 1.00     Years: 20.00     Pack years: 20.00     Types: Cigarettes     Quit date: 3/15/2003     Years since quittin.7    Smokeless tobacco: Never   Substance Use Topics    Alcohol use: No                                Counseling given: Not Answered      Vital Signs (Current):   Vitals:    22 2015 22 2231 22 0322 22 0915   BP:  122/71 126/80 114/75   Pulse: 96 94 (!) 101    Resp:  16 16    Temp:  98.1 °F (36.7 °C) 97.9 °F (36.6 °C) 98.5 °F (36.9 °C)   TempSrc:  Oral Oral    SpO2:  96% 95%    Weight:       Height:                                                  BP Readings from Last 3 Encounters:   22 114/75   22 (!) 142/71   10/25/22 115/61       NPO Status:                                                                                 BMI:   Wt Readings from Last 3 Encounters:   22 163 lb 5.8 oz (74.1 kg)   22 163 lb 6 oz (74.1 kg)   10/25/22 163 lb (73.9 kg)     Body mass index is 28.04 kg/m².     CBC:   Lab Results   Component Value Date/Time    WBC 6.3 2022 04:35 AM    RBC 3.45 2022 04:35 AM    HGB 10.8 2022 04:35 AM    HCT 32.1 2022 04:35 AM    MCV 93.0 2022 04:35 AM    RDW 14.0 2022 04:35 AM     2022 04:35 AM       CMP:   Lab Results   Component Value Date/Time     2022 04:35 AM    K 4.1 2022 04:35 AM    K 4.0 2022 04:50 AM     2022 04:35 AM    CO2 23 2022 04:35 AM    BUN 18 2022 04:35 AM    CREATININE 0.9 2022 04:35 AM    GFRAA >60 2022 11:12 AM    GFRAA >60 01/10/2013 10:59 AM    AGRATIO 2.0 2022 08:16 AM    LABGLOM >60 2022 04:35 AM    GLUCOSE 195 2022 04:35 AM    PROT 6.8 2022 08:16 AM    PROT 7.5 2012 02:06 PM    CALCIUM 9.2 2022 04:35 AM    BILITOT 0.4 2022 08:16 AM    ALKPHOS 57 2022 08:16 AM    AST 14 12/03/2022 08:16 AM    ALT 11 12/03/2022 08:16 AM       POC Tests:   Recent Labs     12/06/22  0731   POCGLU 172*       Coags:   Lab Results   Component Value Date/Time    PROTIME 13.7 12/04/2022 04:50 AM    INR 1.06 12/04/2022 04:50 AM       HCG (If Applicable): No results found for: PREGTESTUR, PREGSERUM, HCG, HCGQUANT     ABGs: No results found for: PHART, PO2ART, WEL0ASK, MJZ5QRY, BEART, Y7DHARFW     Type & Screen (If Applicable):  No results found for: LABABO, LABRH    Drug/Infectious Status (If Applicable):  No results found for: HIV, HEPCAB    COVID-19 Screening (If Applicable):   Lab Results   Component Value Date/Time    COVID19 NOT DETECTED 12/03/2022 08:18 AM           Anesthesia Evaluation  Patient summary reviewed no history of anesthetic complications:   Airway: Mallampati: II  TM distance: >3 FB   Neck ROM: full  Mouth opening: > = 3 FB   Dental:    (+) upper dentures      Pulmonary:normal exam  breath sounds clear to auscultation  (+) shortness of breath:      (-) COPD, asthma and sleep apnea                           Cardiovascular:  Exercise tolerance: good (>4 METS),   (+) hypertension:, past MI:, dysrhythmias: atrial fibrillation, CHF:,     (-) CAD,  angina and  ROBLEDO      Rhythm: regular  Rate: normal                 ROS comment: HOCM     Neuro/Psych:   (+) neuromuscular disease:,    (-) seizures and TIA           GI/Hepatic/Renal:   (+) GERD: well controlled,      (-) liver disease and no renal disease       Endo/Other:    (+) Diabetes, . Abdominal:             Vascular: negative vascular ROS. Other Findings:           Anesthesia Plan      MAC     ASA 4     (I discussed with the patient the risks and benefits of PIV, anesthesia, IV Narcotics, PACU. All questions were answered the patient agrees with the plan and wishes to proceed)  Induction: intravenous.                             Merari Kaufman MD   12/6/2022

## 2022-12-06 NOTE — PROGRESS NOTES
Physician Progress Note      PATIENT:               Charley Combs  CSN #:                  [id number]  :                       1945  ADMIT DATE:       12/3/2022 7:54 AM  [address] DATE:        2022 9:40 AM  RESPONDING  PROVIDER #:        Yeison Claire MD          QUERY TEXT:    Patient admitted with chest pain and SOB. Diagnosed with new onset afib. Elevated troponin-#Elevated troponin -Less than 0.01 > 0.23 > 0.45.--> 0.32   -Likely related to tachyarrhythmia-If possible, please document in the   progress notes and discharge summary if you are evaluating and/or treating any   of the following: The medical record reflects the following:  Risk Factors: advanced age, new onset afib, dm, htn, hld, CHF, HCM  Clinical Indicators: cardio: Elevated Wero may be related to demand ischemia   from tachycardia but lateral ST changes on EKG and CAD needs to be   considered. , Elevated troponin -Less than 0.01 > 0.23 > 0.45.--> 0.32  -Likely related to tachyarrhythmia  Treatment: cardiology consult, plan for HC today, asa, statins, full dose   anticoagulation, tele, EKG    Thank you for your assistance,  Rosio French RN,BSN,CCDS,CRCR  Options provided:  -- Type 2 MI related too tachyarrhythmia  -- Demand Ischemia with MI  -- Demand Ischemia only, no MI  -- Other - I will add my own diagnosis  -- Disagree - Not applicable / Not valid  -- Disagree - Clinically unable to determine / Unknown  -- Refer to Clinical Documentation Reviewer    PROVIDER RESPONSE TEXT:    This patient has demand ischemia only, no MI.     Query created by: Shawna Deluna on 2022 2:40 PM      Electronically signed by:  Yeison Claire MD 2022 7:11 PM

## 2022-12-06 NOTE — ANESTHESIA POSTPROCEDURE EVALUATION
04:35 AM        GLUCOSE                  195 (H)             12/06/2022 04:35 AM   DARIO  Lab Results       Component                Value               Date/Time                  PROTIME                  13.7                12/04/2022 04:50 AM        INR                      1.06                12/04/2022 04:50 AM     Intake & Output:  @68BMSM@    Nausea & Vomiting:  No    Level of Consciousness:  Awake    Pain Assessment:  Adequate analgesia    Anesthesia Complications:  No apparent anesthetic complications    SUMMARY      Vital signs stable  OK to discharge from Stage I post anesthesia care.   Care transferred from Anesthesiology department on discharge from perioperative area

## 2022-12-06 NOTE — H&P
Hospital Medicine History & Physical      PCP: Court Boston MD    Date of Admission: 12/5/2022    Date of Service: Pt seen/examined on 6 Dec and Admitted to Inpatient with expected LOS greater than two midnights due to medical therapy. Chief Complaint:  Palpitations and SOB      History Of Present Illness:      69 y.o. female w/ hx HTN/Lipids and DM who presented to [facility] in transfer from OSH with a 1 day hx of acute onset palpitations and associated SOB that woke her from sleep. She was found to be in Afib w/ RVR that responded to Diltiazem/Dig and was transferred to [facility] s/p [facility] 5 Dec w/out need for intervention. The patient denies any fever/chills or other signs/sxs of systemic illness or identifiable aggravating/alleviating factors. Past Medical History:          Diagnosis Date    CTS (carpal tunnel syndrome)     DM (diabetes mellitus) ([facility] [address]     foot 4/07, micral 4/07    Elevated LFT's     HTN     Hyperlipidemia     neg gxt 11/00    Lung mass     stable    Peripheral neuropathy     Protein in urine        Past Surgical History:          Procedure Laterality Date    HYSTERECTOMY (CERVIX STATUS UNKNOWN)      LUMBAR DISC SURGERY      TRE AND BSO (CERVIX REMOVED)         Medications Prior to Admission:      Prior to Admission medications    Medication Sig Start Date End Date Taking?  Authorizing Provider   metFORMIN (GLUCOPHAGE) 1000 MG tablet TAKE 1 TABLET TWICE A DAY WITH MEALS 11/15/22   Court Boston MD   furosemide (LASIX) 20 MG tablet 2 or 3 qd for swelling 10/25/22   Court Boston MD   JANUVIA 50 MG tablet TAKE 1 TABLET DAILY 9/22/22   Court Boston MD   atenolol (TENORMIN) 25 MG tablet TAKE 1 TABLET DAILY 5/2/22   Court Boston MD   atorvastatin (LIPITOR) 40 MG tablet TAKE 1 TABLET DAILY 2/17/22   Court Boston MD   lisinopril-hydroCHLOROthiazide (PRINZIDE;ZESTORETIC) 20-12.5 MG per tablet TAKE 1 TABLET TWICE A DAY  Patient taking differently: 1 tablet daily 10/19/21   Venkatesh Montana MD   glipiZIDE (GLUCOTROL) 10 MG tablet TAKE 2 TABLETS TWICE A DAY BEFORE MEALS  Patient taking differently: 5 mg 2 times daily (before meals) TAKE 2 TABLETS TWICE A DAY BEFORE MEALS 6/8/21   Venkatesh Montana MD   cyanocobalamin ([employer] VITAMIN B12) 1000 MCG tablet Take 1 tablet by mouth daily 3/11/19   Venkatesh Montana MD   vitamin E 400 UNIT capsule Take 1 capsule by mouth 2 times daily  Patient taking differently: Take 400 Units by mouth daily 3/11/19   Venkatesh Montana MD   Vitamin D (CHOLECALCIFEROL) 1000 UNITS CAPS capsule Take 1,000 Units by mouth daily 4/28/15   Venkatesh Montana MD   Ascorbic Acid (VITAMIN C CR) 500 MG TBCR Take  by mouth. Historical Provider, MD   aspirin 81 MG EC tablet Take 81 mg by mouth daily. Historical Provider, MD       Allergies:  Actos [pioglitazone hydrochloride] and Levaquin [levofloxacin]    Social History:      The patient currently lives independently w/  present at bedside    TOBACCO:   reports that she quit smoking about 19 years ago. Her smoking use included cigarettes. She has a 20.00 pack-year smoking history. She has never used smokeless tobacco.  ETOH:   reports no history of alcohol use. Family History:      Reviewed in detail and negative for DM, CAD, CVA. Positive as follows:        Problem Relation Age of Onset    Cancer Father        REVIEW OF SYSTEMS:   Pertinent positives as noted in the HPI. All other systems reviewed and negative. PHYSICAL EXAM:    /80   Pulse (!) 101   Temp 97.9 °F (36.6 °C) (Oral)   Resp 16   Ht 5' 4\" (1.626 m)   Wt 163 lb 5.8 oz (74.1 kg)   SpO2 95%   BMI 28.04 kg/m²     General appearance:  No apparent distress, appears stated age and cooperative. HEENT:  Normal cephalic, atraumatic without obvious deformity. Pupils equal, round, and reactive to light. Extra ocular muscles intact. Conjunctivae/corneas clear. Neck: Supple, with full range of motion.  No jugular venous distention. Trachea midline. Respiratory:  Normal respiratory effort. Clear to auscultation, bilaterally without Rales/Wheezes/Rhonchi. Cardiovascular:  Regular rate and rhythm with normal S1/S2 without murmurs, rubs or gallops. Abdomen: Soft, non-tender, non-distended with normal bowel sounds. Musculoskeletal:  No clubbing, cyanosis or edema bilaterally. Full range of motion without deformity. Skin: Skin color, texture, turgor normal.  No rashes or lesions. Neurologic:  Neurovascularly intact without any focal sensory/motor deficits. Cranial nerves: II-XII intact, grossly non-focal.  Psychiatric:  Alert and oriented, thought content appropriate, normal insight  Capillary Refill: Brisk,< 3 seconds   Peripheral Pulses: +2 palpable, equal bilaterally       CXR:  I have reviewed the CXR with the following interpretation: No acute ASD  EKG:  I have reviewed the EKG with the following interpretation: No acute ischemic changes     Labs:     Recent Labs     12/06/22  0435   WBC 6.3   HGB 10.8*   HCT 32.1*        Recent Labs     12/04/22  0450 12/06/22  0435    141   K 4.0 4.1    110   CO2 26 23   BUN 25* 18   CREATININE 1.1 0.9   CALCIUM 10.0 9.2   PHOS  --  3.2     No results for input(s): AST, ALT, BILIDIR, BILITOT, ALKPHOS in the last 72 hours.   Recent Labs     12/04/22  0450   INR 1.06     Recent Labs     12/03/22  1358 12/04/22  0450 12/05/22  0739   TROPONINI 0.23* 0.45* 0.32*       Urinalysis:      Lab Results   Component Value Date/Time    BLOODU neg 07/07/2020 02:02 PM    SPECGRAV 1.010 07/07/2020 02:02 PM    GLUCOSEU neg 07/07/2020 02:02 PM         Consults:    None      ASSESSMENT:    Active Hospital Problems    Diagnosis Date Noted    DM (diabetes mellitus) [E11.9]      Priority: High    NSTEMI (non-ST elevated myocardial infarction) ([facility] [address] [I21.4] 12/05/2022     Priority: Medium    Nonrheumatic mitral valve regurgitation [I34.0] 12/05/2022     Priority: Medium    HOCM (hypertrophic obstructive cardiomyopathy) ([facility] [I42.1] 12/05/2022     Priority: Medium    [facility] [I48.91] 12/03/2022     Priority: Medium    Hypercholesteremia [E78.00] 12/29/2015    Essential hypertension [I10] 08/26/2015       PLAN:      NSTEMI - Cardiology consulted from ED s/p [facility] 5 Dec w/out occlusive CAD requiring no intervention. Continue medical mgt. HTN - w/out known CAD and no evidence of active signs/sxs of ischemia/failure. Currently controlled on home meds w/ vitals reviewed and documented as above. HyperLipidemia - controlled on home Statin. Continue, w/ f/u and med adjustment w/ PCP    Afib - chronic paroxysmal of unspecified and clinically unable to determine etiology w/ RVR POArrival.  Normally rate controlled on BBlocker - continued. HOCM - Cardiology plan for LORNA 6 Dec.     DM2 - controlled on home oral antiGlycemics - held. Follow FSBS/SSI low regimen. Last HbA1c 6.3% dated October 2022. Anticipate resuming/continuing home regimen at discharge. DVT Prophylaxis: Tx dose LMWH  Diet: Diet NPO Exceptions are: Sips of Water with Meds  Code Status: Full Code      PT/OT Eval Status: not yet ordered. Dispo - Patient is likely to remain in-house at least until Tues/Wed 6/7 Dec pending clinical course and subspecialty Kieran Montalvo MD    Thank you Chelsea Skaggs MD for the opportunity to be involved in this patient's care. If you have any questions or concerns please feel free to contact me at [phone].

## 2022-12-07 VITALS
TEMPERATURE: 98.1 F | WEIGHT: 163.36 LBS | DIASTOLIC BLOOD PRESSURE: 66 MMHG | HEART RATE: 80 BPM | RESPIRATION RATE: 16 BRPM | SYSTOLIC BLOOD PRESSURE: 110 MMHG | OXYGEN SATURATION: 95 % | BODY MASS INDEX: 27.89 KG/M2 | HEIGHT: 64 IN

## 2022-12-07 LAB
ALBUMIN SERPL-MCNC: 3.9 G/DL (ref 3.4–5)
ANION GAP SERPL CALCULATED.3IONS-SCNC: 9 MMOL/L (ref 3–16)
BUN BLDV-MCNC: 20 MG/DL (ref 7–20)
CALCIUM SERPL-MCNC: 9.4 MG/DL (ref 8.3–10.6)
CHLORIDE BLD-SCNC: 104 MMOL/L (ref 99–110)
CO2: 25 MMOL/L (ref 21–32)
CREAT SERPL-MCNC: 1 MG/DL (ref 0.6–1.2)
GFR SERPL CREATININE-BSD FRML MDRD: 58 ML/MIN/{1.73_M2}
GLUCOSE BLD-MCNC: 204 MG/DL (ref 70–99)
GLUCOSE BLD-MCNC: 290 MG/DL (ref 70–99)
GLUCOSE BLD-MCNC: 339 MG/DL (ref 70–99)
HCT VFR BLD CALC: 32.8 % (ref 36–48)
HEMOGLOBIN: 11.2 G/DL (ref 12–16)
MCH RBC QN AUTO: 31.3 PG (ref 26–34)
MCHC RBC AUTO-ENTMCNC: 34.1 G/DL (ref 31–36)
MCV RBC AUTO: 92 FL (ref 80–100)
PDW BLD-RTO: 14.1 % (ref 12.4–15.4)
PERFORMED ON: ABNORMAL
PERFORMED ON: ABNORMAL
PHOSPHORUS: 3.1 MG/DL (ref 2.5–4.9)
PLATELET # BLD: 228 K/UL (ref 135–450)
PMV BLD AUTO: 9.2 FL (ref 5–10.5)
POTASSIUM SERPL-SCNC: 4.1 MMOL/L (ref 3.5–5.1)
RBC # BLD: 3.56 M/UL (ref 4–5.2)
SODIUM BLD-SCNC: 138 MMOL/L (ref 136–145)
WBC # BLD: 6.6 K/UL (ref 4–11)

## 2022-12-07 PROCEDURE — 6360000002 HC RX W HCPCS: Performed by: INTERNAL MEDICINE

## 2022-12-07 PROCEDURE — 99232 SBSQ HOSP IP/OBS MODERATE 35: CPT | Performed by: NURSE PRACTITIONER

## 2022-12-07 PROCEDURE — 80069 RENAL FUNCTION PANEL: CPT

## 2022-12-07 PROCEDURE — 36415 COLL VENOUS BLD VENIPUNCTURE: CPT

## 2022-12-07 PROCEDURE — 2580000003 HC RX 258: Performed by: INTERNAL MEDICINE

## 2022-12-07 PROCEDURE — 6370000000 HC RX 637 (ALT 250 FOR IP): Performed by: INTERNAL MEDICINE

## 2022-12-07 PROCEDURE — 85027 COMPLETE CBC AUTOMATED: CPT

## 2022-12-07 RX ORDER — FUROSEMIDE 40 MG/1
40 TABLET ORAL DAILY
Qty: 60 TABLET | Refills: 3 | Status: SHIPPED | OUTPATIENT
Start: 2022-12-08 | End: 2022-12-29 | Stop reason: SDUPTHER

## 2022-12-07 RX ADMIN — ATENOLOL 25 MG: 25 TABLET ORAL at 09:03

## 2022-12-07 RX ADMIN — ASPIRIN 81 MG: 81 TABLET, COATED ORAL at 09:03

## 2022-12-07 RX ADMIN — ENOXAPARIN SODIUM 70 MG: 100 INJECTION SUBCUTANEOUS at 09:03

## 2022-12-07 RX ADMIN — Medication 10 ML: at 09:04

## 2022-12-07 RX ADMIN — INSULIN LISPRO 2 UNITS: 100 INJECTION, SOLUTION INTRAVENOUS; SUBCUTANEOUS at 09:08

## 2022-12-07 RX ADMIN — ATORVASTATIN CALCIUM 40 MG: 40 TABLET, FILM COATED ORAL at 09:03

## 2022-12-07 RX ADMIN — INSULIN LISPRO 3 UNITS: 100 INJECTION, SOLUTION INTRAVENOUS; SUBCUTANEOUS at 13:45

## 2022-12-07 RX ADMIN — FUROSEMIDE 40 MG: 40 TABLET ORAL at 09:03

## 2022-12-07 NOTE — PROGRESS NOTES
Hospitalist Progress Note      PCP: Fareed Barnes MD    Date of Admission: 12/5/2022    Chief Complaint: Palpitations and SOB    Subjective: no new c/o. Medications:  Reviewed    Infusion Medications    sodium chloride      sodium chloride      dextrose       Scheduled Medications    sodium chloride flush  5-40 mL IntraVENous 2 times per day    aspirin  81 mg Oral Daily    atenolol  25 mg Oral Daily    atorvastatin  40 mg Oral Daily    furosemide  40 mg Oral Daily    sodium chloride flush  5-40 mL IntraVENous 2 times per day    insulin lispro  0-4 Units SubCUTAneous TID WC    insulin lispro  0-4 Units SubCUTAneous Nightly    enoxaparin  1 mg/kg SubCUTAneous BID     PRN Meds: sodium chloride flush, sodium chloride, sodium chloride flush, sodium chloride, ondansetron **OR** ondansetron, polyethylene glycol, acetaminophen **OR** acetaminophen, glucose, dextrose bolus **OR** dextrose bolus, glucagon (rDNA), dextrose      Intake/Output Summary (Last 24 hours) at 12/7/2022 1000  Last data filed at 12/6/2022 1200  Gross per 24 hour   Intake 340 ml   Output --   Net 340 ml       Physical Exam Performed:    BP (!) 149/77   Pulse 62   Temp 98.4 °F (36.9 °C) (Oral)   Resp 18   Ht 5' 4\" (1.626 m)   Wt 163 lb 5.8 oz (74.1 kg)   SpO2 95%   BMI 28.04 kg/m²     General appearance: No apparent distress, appears stated age and cooperative. HEENT: Pupils equal, round, and reactive to light. Conjunctivae/corneas clear. Neck: Supple, with full range of motion. No jugular venous distention. Trachea midline. Respiratory:  Normal respiratory effort. Clear to auscultation, bilaterally without Rales/Wheezes/Rhonchi. Cardiovascular: Regular rate and rhythm with normal S1/S2 without murmurs, rubs or gallops. Abdomen: Soft, non-tender, non-distended with normal bowel sounds. Musculoskeletal: No clubbing, cyanosis or edema bilaterally. Full range of motion without deformity.   Skin: Skin color, texture, turgor normal.  No rashes or lesions. Neurologic:  Neurovascularly intact without any focal sensory/motor deficits. Cranial nerves: II-XII intact, grossly non-focal.  Psychiatric: Alert and oriented, thought content appropriate, normal insight  Capillary Refill: Brisk,< 3 seconds   Peripheral Pulses: +2 palpable, equal bilaterally       Labs:   Recent Labs     12/06/22 0435 12/07/22 0453   WBC 6.3 6.6   HGB 10.8* 11.2*   HCT 32.1* 32.8*    228     Recent Labs     12/06/22 0435 12/07/22 0453    138   K 4.1 4.1    104   CO2 23 25   BUN 18 20   CREATININE 0.9 1.0   CALCIUM 9.2 9.4   PHOS 3.2 3.1     No results for input(s): AST, ALT, BILIDIR, BILITOT, ALKPHOS in the last 72 hours. No results for input(s): INR in the last 72 hours. Recent Labs     12/05/22  0739   TROPONINI 0.32*       Urinalysis:      Lab Results   Component Value Date/Time    BLOODU neg 07/07/2020 02:02 PM    SPECGRAV 1.010 07/07/2020 02:02 PM    GLUCOSEU neg 07/07/2020 02:02 PM       Consults:    None      Assessment/Plan:    Active Hospital Problems    Diagnosis     DM (diabetes mellitus) [E11.9]      Priority: High    [facility]non[facility] [I21.4]      Priority: Medium    Nonrheumatic mitral valve regurgitation [I34.0]      Priority: Medium    HOCM (hypertrophic obstructive cardiomyopathy) ([facility] [I42.1]      Priority: Medium    Atrial fibrillation with RVR ([facility] [I48.91]      Priority: Medium    Hypercholesteremia [E78.00]     Essential hypertension [I10]          NSTEMI - Cardiology consulted from ED s/p [facility] 5 Dec w/out occlusive CAD requiring no intervention. Continue medical mgt. HTN - w/out known CAD and no evidence of active signs/sxs of ischemia/failure. Currently controlled on home meds w/ vitals reviewed and documented as above. HyperLipidemia - controlled on home Statin.  Continue, w/ f/u and med adjustment w/ PCP     Afib - chronic paroxysmal of unspecified and clinically unable to determine etiology w/ RVR POArrival.  Normally rate controlled on BBlocker - continued. HOCM - Cardiology consulted s/p [facility]NA 6 Dec w/ severe concentric LVH. Plan for further outpt w/up. DM2 - controlled on home oral antiGlycemics - held. Follow FSBS/SSI low regimen. Last HbA1c 6.3% dated October 2022. Anticipate resuming/continuing home regimen at discharge. DVT Prophylaxis: Tx dose LMW    Recent Labs     12/06/22  0435 12/07/22  0453    228     Diet: ADULT DIET; Regular  Code Status: Full Code      PT/OT Eval Status: not yet ordered. Dispo - Patient is likely to remain in-house at least until Wed/Thurs 7/8 Dec pending clinical course and subspecialty recs.   Enrique Yu of 63 years  present at bedside when seen 6/7 Dec.        Delfin Pendleton MD

## 2022-12-07 NOTE — PROGRESS NOTES
--Patient provided with discharge instructions and  prescriptions to be picked up at outpatient pharmacy. --Instructions, dosing, and follow-up appointments reviewed with patient/family. No further questions or needs at this time. --Vital signs and patient stable upon discharge. --Patient ambulatory to [facility]. -IV and tele removed prior to dc, all questions answered .

## 2022-12-07 NOTE — PROGRESS NOTES
[address]   Daily Progress Note    Admit Date:  12/5/2022  HPI:  No chief complaint on file. Interval history: Author Shakira is being followed for NSTEMI, AFib rvr, HOCM. Subjective:  Ms. Christian Arana feels good, ready to go home. Family at the bedside. Objective:   /66   Pulse 69   Temp 98.1 °F (36.7 °C) (Oral)   Resp 16   Ht 5' 4\" (1.626 m)   Wt 163 lb 5.8 oz (74.1 kg)   SpO2 95%   BMI 28.04 kg/m²   No intake or output data in the 24 hours ending 12/07/22 1328    NYHA: III    Physical Exam:  General:  Awake, alert, NAD, sitting up on the side of bed eating lunch   Skin:  Warm and dry  Neck:  JVD<8  Chest:  Clear to auscultation, no wheezes/rhonchi/rales  Telemetry: atrial flutter rate 80's   Cardiovascular:  irregular  S1S2, + murmur  Abdomen:  Soft, nontender, +bowel sounds  Extremities:  no  bilateral lower extremity edema, right wrist with bruising, no hematoma     Medications:    apixaban  5 mg Oral BID    sodium chloride flush  5-40 mL IntraVENous 2 times per day    aspirin  81 mg Oral Daily    atenolol  25 mg Oral Daily    atorvastatin  40 mg Oral Daily    furosemide  40 mg Oral Daily    sodium chloride flush  5-40 mL IntraVENous 2 times per day    insulin lispro  0-4 Units SubCUTAneous TID WC    insulin lispro  0-4 Units SubCUTAneous Nightly      sodium chloride      sodium chloride      dextrose         Lab Data:  CBC:   Recent Labs     12/06/22  0435 12/07/22  0453   WBC 6.3 6.6   HGB 10.8* 11.2*    228     BMP:    Recent Labs     12/06/22  0435 12/07/22  0453    138   K 4.1 4.1   CO2 23 25   BUN 18 20   CREATININE 0.9 1.0     INR:  No results for input(s): INR in the last 72 hours. BNP:  No results for input(s): PROBNP in the last 72 hours. Lab Results   Component Value Date    LVEF 65 11/10/2022       Testing:  Echo 11/10/22  Summary   Left ventricle size is normal with severe concentric left ventricular   hypertrophy.    Left ventricular systolic function is hyperdynamic with ejection fraction   estimated at greater than 65%. No regional wall motion abnormalities are noted. Grade II diastolic dysfunction with elevated filling pressure. There is a late peaking LV outflow tract gradient of 135 mmHg consistent with resting LVOT obstruction. Gradient increases to 160 mmHg with valsalva maneuver. The right ventricle is normal in size and function. The left atrium is severely dilated. There is systolic anterior motion of the mitral valve with consequent severe eccentric mitral regurgitation. Mild tricuspid regurgitation. Normal systolic pulmonary artery pressure (SPAP) estimated at 37 mmHg (RA pressure 3 mmHg). Compared with the prior study performed 4-8-2011 (EF65%, LVH, DD1, YEIMY, mod   MR, LVOT obstruction mean PG 47 mmHg), the LVEF remains hyperdynamic,   resting LVOT obstruction has worsened, with the degree of mitral   regurgitation now noted severe. CARDIAC CATHETERIZATION REPORT  Date of Procedure: 12/5/2022  : Thony Bedolla DO  Primary Indication: NSTEMI, HOCM, severe eccentric mitral regurgitation     Procedures Performed:  1. Coronary angiography  2. Left heart catheterization  3. Right heart catheterization  4. Ultrasound-guided right radial artery access  5. Moderate conscious sedation     Procedural Details:  Access: Local anesthetic was given and access was obtained in the right radial artery using a micropuncture technique and a 6F Terumo Slender Sheath was placed without difficulty. A 5F Terumo Sheath was placed in the right brachial vein via wire exchange of an existing IV that was placed under sterile conditions. Diagnostic: A 5F Epping Pian catheter was used to perform the right heart catheterization. A 5F TIG catheter was used to perform selective right and left coronary angiography. The 5F TIG catheter was also used to perform the left heart catheterization. Hemostasis:  At the end of the procedure, the radial sheath was removed and a hemoband was placed over the arteriotomy site and filled with air to maintain hemostasis. The venous sheath was removed and manual pressure applied to maintain hemostasis. Findings:  Hemodynamics:  A. Right heart catheterization                   1. RA: 6 mmHg                   2. RV: 46/7 mmHg                   3. PA: 17 mmHg                   4. PCWP:17 mmHg (v-waves as high as 26 mmHg)                   5. Saturations: AO 90%, RA 54%, PA 57%                   6. Ana CO: 4.0 L/min                   7. Ana CI: 2.23 L/min*m2                   8. Ana SVR: 1,400 dyne*sec/cm5                   9. Ana PVR: 240 dyne*sec/cm5              B. Opening arterial pressure: 109/62 (76) mmHg              C. LV pressure: 168/15 at baseline and LV systolic pressure increased to 229 mmHg post-PVC              D. Peak-to-peak gradient of 59 mmHg on catheter pull-back across aortic valve     2. Coronary anatomy:  A. Left main artery: The left main artery bifurcates into the left anterior descending artery and left circumflex artery. The left main artery is very short and has no angiographically significant disease. B. Left anterior descending artery: Transapical vessel which gives rise to 4 diagonal arteries. The LAD has a 30% proximal stenosis. The remainder of the vessel has minor luminal irregularities. The first diagonal artery is a medium-large caliber vessel with a high orin and has a 20% ostial stenosis. The second, third, and fourth diagonal arteries are very small. C. Left circumflex artery: Non-dominant vessel that gives rise to 4 obtuse marginal arteries. The Lcx has 20% proximal and 30% mid-vessel stenoses. The OM1 is a small vessel with a high origin and has a 30% proximal stenosis. OM2 is very small and has minor luminal irregularities. The OM3 is a medium caliber vessel with minor luminal irregularities.   The OM4 is a small to medium caliber vessel with minor luminal irregularities. D. Right coronary artery: Dominant vessel that gives rise to the posterior descending artery and 2 posterolateral branches. The proximal RCA has a 20% stenosis. The remainder of the vessel has minor luminal irregularities. The posterior descending artery has minor luminal irregularities. The right posterolateral branches have minor luminal irregularities. Technical Factors:  Complications:  None. Estimated blood loss: Minimal.  Radiation:  Air kerma 579 mGy and 2.7 minutes of fluoroscopy  Sedation: Moderate conscious sedation was administered by qualified nursing personnel under continuous hemodynamic monitoring, starting at 12:14 PM and ending at 12:36 PM.  Medications: 2.5 mg IA verapamil, 2 mg IV Versed, 25 mcg IV Fentanyl, 5,000 units IV heparin  Contrast: 30 cc of Isovue      Impression:  1. Probable type 2 NSTEMI secondary to demand ischemia from atrial fibrillation with RVR. 2. Mild non-obstructive coronary artery disease. 3. Mildly elevated left-sided cardiac filling pressures. 4. Mildly elevated right-sided cardiac filling pressures. 5. Mild pulmonary hypertension. 6. Low normal Ana cardiac output and index. 7. TTE findings are suggestive of HOCM. There was a peak-to-peak gradient of 59 mmHg on catheter pull-back across the aortic valve. 8. Severe eccentric mitral regurgitation on TTE. 9. Atrial fibrillation. Plan:  1. Can resume SC lovenox 1 mg/kg this evening (atrial fibrillation). 2. Continue aspirin 81 mg daily, atorvastatin 40 mg daily, atenolol 25 mg daily, and lasix 40 mg daily (need to be cautious not to drop preload too drastically in setting of HOCM). 3. Plan for L[facility]NA tomorrow to further evaluate severity and etiology of mitral regurgitation.   4. Pending results of above, can consider CT surgery and/or structural heart team evaluation for more definitive treatment of HOCM and MR.       LORNA Findings: 12/6/22  Conclusions   Ejection fraction is visually estimated to be 55-60%. There is severe concentric LVH. Turbulent color flow noted in LV outflow tract. Normal RV size and systolic function. The left atrium is dilated. There is no evidence of mass or thrombus in the left atrium or appendage. Mild thickening of leaflets of mitral valve. There appears to be mild prolapse of the anterior mitral valve leaflet. No systolic anterior motion is appreciated. There is at least moderate eccentric and posteriorly directed mitral   regurgitation. The severity of regurgitation is difficult to fully quantify   due to the eccentricity of the regurgitant jet. Recommend obtaining cardiac MRI for further evaluation. Mild tricuspid and pulmonic regurgitation. A bubble study was performed and shows no evidence of a right-to-left atrial   level shunt. Recommendations:  1. Recommend obtaining outpatient cardiac MRI to further characterize myocardium, evaluate degree of LVOT obstruction, assess for septal scarring, and help further define severity of mitral regurgitation. 2. Continue aspirin 81 mg daily, atorvastatin 40 mg daily, atenolol 25 mg daily, and lasix 40 mg daily. Need to continue to be cautious with diuretic and avoid dropping preload too drastically. 3. Can transition from [place] Lovenox to Eliquis 5 mg BID prior to discharge. 4. Given episodes of transient a-fib with slow ventricular response, we are limited in ability to further uptitrate beta-blocker or add calcium channel blocker. 5. Consider future trial of disopyramide, but will plan to assess degree of septal scarring present on cardiac MRI prior to initiating given potential for increased risk of ventricular arrhythmias. 6. Refer to structural heart team at [facility] and pending results of above work-up and clinical course, may be future candidate for septal ablation versus myomectomy.     Principal Problem:    NSTEMI (non-ST elevated myocardial infarction) [facility])  Active Problems: DM (diabetes mellitus)    Atrial fibrillation with RVR (HCC)    Nonrheumatic mitral valve regurgitation    HOCM (hypertrophic obstructive cardiomyopathy) ([facility] [address]    Essential hypertension    Hypercholesteremia  Resolved Problems:    * No resolved hospital problems. *      Assessment:  NSTEMI- present on admission to [facility] in the setting of Afib with rvr   Afib RVR  HOCM  SEvere LVH  Mild non-obstructive CAD  Moderate Mitral regurg- needs cardiac MRI to assess severity further   DM  HTN  HLD    Plan:  Continue aspirin and statin  Continue atenolol 25mg daily - limited on uptitrating due to some intermittent bradycardia  Continue low dose lasix 40mg daily   Start Eliquis for afib     Discussed recommendations regarding referral to [facility] structural heart team for further evaluation. Discussed Cardiac MRI with patient. Okay for discharge, cardiology will sign off, please call with questions     Cardiac meds addressed on med rec. Education provided today Disease process and medications discussed. Questions answered fully.   Total Time spent educating today 15 minutes     NEYMAR Abad - CNP,  12/7/2022, 1:41 PM

## 2022-12-08 ENCOUNTER — CARE COORDINATION (OUTPATIENT)
Dept: CASE MANAGEMENT | Age: 77
End: 2022-12-08

## 2022-12-08 ENCOUNTER — TELEPHONE (OUTPATIENT)
Dept: FAMILY MEDICINE CLINIC | Age: 77
End: 2022-12-08

## 2022-12-08 DIAGNOSIS — I48.91 ATRIAL FIBRILLATION WITH RVR (HCC): Primary | ICD-10-CM

## 2022-12-08 PROCEDURE — 1111F DSCHRG MED/CURRENT MED MERGE: CPT | Performed by: FAMILY MEDICINE

## 2022-12-08 RX ORDER — GLIPIZIDE 10 MG/1
TABLET ORAL
Qty: 360 TABLET | Refills: 3 | Status: SHIPPED | OUTPATIENT
Start: 2022-12-08

## 2022-12-08 NOTE — CARE COORDINATION
[facility] Care Transitions Initial Follow Up Call    Call within 2 business days of discharge: Yes    Care Transition Nurse contacted the patient by telephone to perform post hospital discharge assessment. Verified name and  with patient as identifiers. Provided introduction to self, and explanation of the Care Transition Nurse role. Patient: Kirsten Beltrán Patient : 1945   MRN: [id number]  Reason for Admission: NSTEMI, HOCM (hypertrophic obstructive cardiomyopathy), severe eccentric mitral regurgitation  Discharge Date: 22 RARS: Readmission Risk Score: 11.2      Last Discharge  Street       Date Complaint Diagnosis Description Type Department Provider    22  [facility]hypertrophic [facility]) [facility] . .. Admission (Discharged) from Theo Sloan MD            Was this an external facility discharge? No Discharge Facility:     Challenges to be reviewed by the provider   Additional needs identified to be addressed with provider: Yes  Post hospitalization visit with PCP has not been scheduled yet. Patient stated that she was going to call Dr Crystal Shea today and get appt scheduled               Method of communication with provider: chart routing. Patient answered call and verified . Patient pleasant and agreeable to transition call. Patient doing well since discharge home from hospital, but has some soreness in her right wrist. Stated that post procedure was removed from her wrist yesterday and was instructed to use wrist as usual except for no heavy lifting. Patient concerned with soreness that she has over done it. Patient is resting her wrist today. CTN provided patient with Dr Roopa Hobson office if she needed to reach provider. Confirmed that she had AVS and reviewed full medication reconciliation and noted in system. Glipizide confirmed that she is taking 10mg tablets and she is taking 1 tablet BID. CTN unable to remove previous note.    Patient has follow up visits scheduled for further testing, but no follow up with PCP noted in system. CTN encouraged patient to reach out to PCP and see if post hospitalization visit needed scheduled. Stated that she would call office today. CTN to also route note to provider. Denies any acute needs at present time. Agreeable to f/u calls. Educated on the use of urgent care or physicians 24 hr access line if assistance is needed after hours. Care Transition Nurse reviewed discharge instructions with patient who verbalized understanding. The patient was given an opportunity to ask questions and does not have any further questions or concerns at this time. Were discharge instructions available to patient? Yes. Reviewed appropriate site of care based on symptoms and resources available to patient including: PCP  Specialist  When to call 911. The patient agrees to contact the PCP office for questions related to their healthcare. Advance Care Planning:   Does patient have an Advance Directive: not on file. Medication reconciliation was performed with patient, who verbalizes understanding of administration of home medications. Medications reviewed, 1111F entered: yes    Was patient discharged with a pulse oximeter? no    Non-face-to-face services provided:  Obtained and reviewed discharge summary and/or continuity of care documents    Offered patient enrollment in the Remote Patient Monitoring (RPM) program for in-home monitoring:  unable to introduce at initial call .     Care Transitions 24 Hour Call    Do you have a copy of your discharge instructions?: Yes  Do you have all of your prescriptions and are they filled?: Yes  Have you been contacted by a [address]?: No  Do you have support at home?: Partner/Spouse/SO  Care Transitions Interventions         Follow Up  Future Appointments   Date Time Provider Marcos Murphy   1/30/2023  2:45 PM MD PATI Acuna[facility]   3/6/2023 10:20 AM Lorenzo Neville Margo [place] [id number] [facility] Transition Nurse provided contact information. Plan for follow-up call in 5-7 days based on severity of symptoms and risk factors.   Plan for next call: follow-up appointment-did follow up with PCP get scheduled?introduce RPM and enroll if agreeable    Enid Beatty RN

## 2022-12-08 NOTE — TELEPHONE ENCOUNTER
Patient discharged from hospital on 12/5.   She was wanting to schedule her hospital follow up but you do not have any openings do you want to work her in or schedule with NP

## 2022-12-12 ENCOUNTER — OFFICE VISIT (OUTPATIENT)
Dept: FAMILY MEDICINE CLINIC | Age: 77
End: 2022-12-12
Payer: MEDICARE

## 2022-12-12 VITALS
BODY MASS INDEX: 27.29 KG/M2 | SYSTOLIC BLOOD PRESSURE: 134 MMHG | DIASTOLIC BLOOD PRESSURE: 72 MMHG | WEIGHT: 159 LBS | OXYGEN SATURATION: 97 % | HEART RATE: 76 BPM

## 2022-12-12 DIAGNOSIS — I10 ESSENTIAL HYPERTENSION: ICD-10-CM

## 2022-12-12 DIAGNOSIS — I50.9 ACUTE CONGESTIVE HEART FAILURE, UNSPECIFIED HEART FAILURE TYPE (HCC): ICD-10-CM

## 2022-12-12 DIAGNOSIS — R77.8 ELEVATED TROPONIN: ICD-10-CM

## 2022-12-12 DIAGNOSIS — I42.1 HOCM (HYPERTROPHIC OBSTRUCTIVE CARDIOMYOPATHY) (HCC): ICD-10-CM

## 2022-12-12 DIAGNOSIS — I34.0 NONRHEUMATIC MITRAL VALVE REGURGITATION: Primary | ICD-10-CM

## 2022-12-12 DIAGNOSIS — E11.42 TYPE 2 DIABETES MELLITUS WITH DIABETIC POLYNEUROPATHY, WITHOUT LONG-TERM CURRENT USE OF INSULIN (HCC): ICD-10-CM

## 2022-12-12 DIAGNOSIS — F40.240 CLAUSTROPHOBIA: ICD-10-CM

## 2022-12-12 DIAGNOSIS — I48.91 ATRIAL FIBRILLATION WITH RVR (HCC): ICD-10-CM

## 2022-12-12 PROCEDURE — 1123F ACP DISCUSS/DSCN MKR DOCD: CPT | Performed by: FAMILY MEDICINE

## 2022-12-12 PROCEDURE — G8484 FLU IMMUNIZE NO ADMIN: HCPCS | Performed by: FAMILY MEDICINE

## 2022-12-12 PROCEDURE — G8417 CALC BMI ABV UP PARAM F/U: HCPCS | Performed by: FAMILY MEDICINE

## 2022-12-12 PROCEDURE — 1111F DSCHRG MED/CURRENT MED MERGE: CPT | Performed by: FAMILY MEDICINE

## 2022-12-12 PROCEDURE — 1036F TOBACCO NON-USER: CPT | Performed by: FAMILY MEDICINE

## 2022-12-12 PROCEDURE — 1090F PRES/ABSN URINE INCON ASSESS: CPT | Performed by: FAMILY MEDICINE

## 2022-12-12 PROCEDURE — G8399 PT W/DXA RESULTS DOCUMENT: HCPCS | Performed by: FAMILY MEDICINE

## 2022-12-12 PROCEDURE — 3078F DIAST BP <80 MM HG: CPT | Performed by: FAMILY MEDICINE

## 2022-12-12 PROCEDURE — 3044F HG A1C LEVEL LT 7.0%: CPT | Performed by: FAMILY MEDICINE

## 2022-12-12 PROCEDURE — 3074F SYST BP LT 130 MM HG: CPT | Performed by: FAMILY MEDICINE

## 2022-12-12 PROCEDURE — G8427 DOCREV CUR MEDS BY ELIG CLIN: HCPCS | Performed by: FAMILY MEDICINE

## 2022-12-12 PROCEDURE — 99215 OFFICE O/P EST HI 40 MIN: CPT | Performed by: FAMILY MEDICINE

## 2022-12-12 NOTE — DISCHARGE SUMMARY
Hospital Medicine Discharge Summary    Patient ID: Terri Rios      Patient's PCP: Rupinder Forbes MD    Admit Date: 12/5/2022     Discharge Date: 12/7/2022      Admitting Physician: Ericka Ross MD     Discharge Physician: Ericka Ross MD     Discharge Diagnoses: Active Hospital Problems    Diagnosis     DM (diabetes mellitus) [E11.9]      Priority: High    NSTEMI (non-ST elevated myocardial infarction) ([address] [I21.4]      Priority: Medium    Nonrheumatic mitral valve regurgitation [I34.0]      Priority: Medium    HOCM (hypertrophic obstructive cardiomyopathy) ([address] [I42.1]      Priority: Medium    Atrial fibrillation with RVR ([facility] [I48.91]      Priority: Medium    Hypercholesteremia [E78.00]     Essential hypertension [I10]        The patient was seen and examined on day of discharge and this discharge summary is in conjunction with any daily progress note from day of discharge. Hospital Course:       NSTEMI - Cardiology consulted from ED s/p LHCath 5 Dec w/out occlusive CAD requiring no intervention. Continue medical mgt. HTN - w/out known CAD and no evidence of active signs/sxs of ischemia/failure. Currently controlled on home meds      HyperLipidemia - controlled on home Statin. Continue, w/ f/u and med adjustment w/ PCP     Afib - chronic paroxysmal of unspecified and clinically unable to determine etiology w/ RVR POArrival.  Normally rate controlled on BBlocker - continued. HOCM - Cardiology consulted s/p LORNA 6 Dec w/ severe concentric LVH. Plan for further outpt w/up. DM2 - controlled on home oral antiGlycemics - held. Followed FSBS/SSI low regimen. Last HbA1c 6.3% dated October 2022. Resumed home regimen at discharge. Labs:  For convenience and continuity at follow-up the following most recent labs are provided:      CBC:    Lab Results   Component Value Date/Time    WBC 6.6 12/07/2022 04:53 AM    HGB 11.2 12/07/2022 04:53 AM    HCT 32.8 12/07/2022 04:53 AM  12/07/2022 04:53 AM       Renal:    Lab Results   Component Value Date/Time     12/07/2022 04:53 AM    K 4.1 12/07/2022 04:53 AM    K 4.0 12/04/2022 04:50 AM     12/07/2022 04:53 AM    CO2 25 12/07/2022 04:53 AM    BUN 20 12/07/2022 04:53 AM    CREATININE 1.0 12/07/2022 04:53 AM    CALCIUM 9.4 12/07/2022 04:53 AM    PHOS 3.1 12/07/2022 04:53 AM         Significant Diagnostic Studies    Radiology:   MRI CARDIAC W WO CONTRAST    (Results Pending)          Consults:     None    Disposition: Home     Condition at Discharge: Stable    Discharge Instructions/Follow-up:  w/ PCP 1-2 weeks and subspecialists as arranged. Code Status:  Full Code    Activity: activity as tolerated    Diet: regular diet      Discharge Medications:     Discharge Medication List as of 12/7/2022  4:16 PM             Details   apixaban (ELIQUIS) 5 MG TABS tablet Take 1 tablet by mouth 2 times daily, Disp-60 tablet, R-4Normal                Details   furosemide (LASIX) 40 MG tablet Take 1 tablet by mouth daily, Disp-60 tablet, R-3Normal                Details   metFORMIN (GLUCOPHAGE) 1000 MG tablet TAKE 1 TABLET TWICE A DAY WITH MEALS, Disp-180 tablet, R-3Normal      JANUVIA 50 MG tablet TAKE 1 TABLET DAILY, Disp-90 tablet, R-1Normal      atenolol (TENORMIN) 25 MG tablet TAKE 1 TABLET DAILY, Disp-90 tablet, R-3Normal      atorvastatin (LIPITOR) 40 MG tablet TAKE 1 TABLET DAILY, Disp-90 tablet, R-3Normal      glipiZIDE (GLUCOTROL) 10 MG tablet TAKE 2 TABLETS TWICE A DAY BEFORE MEALS, Disp-360 tablet, R-3Normal      cyanocobalamin ([employer] VITAMIN B12) 1000 MCG tablet Take 1 tablet by mouth daily, Disp-30 tablet, R-3OTC      vitamin E 400 UNIT capsule Take 1 capsule by mouth 2 times daily, Disp-30 capsule, R-3OTC      Vitamin D (CHOLECALCIFEROL) 1000 UNITS CAPS capsule Take 1,000 Units by mouth daily, Disp-1 capsule, R-0OTC      Ascorbic Acid (VITAMIN C CR) 500 MG TBCR Take  by mouth.       aspirin 81 MG EC tablet Take 81 mg by mouth daily. Time Spent on discharge is more than 31 minutes in the examination, evaluation, counseling and review of medications and discharge plan. Signed:    Burak Juarez MD   12/12/2022      Thank you Virgen Tamez MD for the opportunity to be involved in this patient's care. If you have any questions or concerns please feel free to contact me at [phone].

## 2022-12-12 NOTE — PROGRESS NOTES
Total time spent on the day of this patient's office encounter (including before, during, and after the face-to-face visit): 50 minutes    Subjective:  Ct Mcclain is here to discuss the following issues. On her last visit we discussed her worsening swelling and history of mitral valve disease and adjusted her diuretic and ordered an echocardiogram.  Echocardiogram revealed severe LVH with severe mitral valve regurgitation outflow obstruction. She was referred to cardiology. Symptoms worsened and she presented to the emergency room on  with chest pressure and shortness of breath. Had an extensive work-up. Ultimately this included transesophageal echocardiogram.  Not all desired data could be collected due to poor visibility and a cardiac MRI has been recommended. In the hospital she had an elevated troponin and had a cardiac catheterization revealing no significant atherosclerosis. Echo confirmed severe hypertrophic cardiomyopathy and mitral valve disease. Secondarily she had a worsening of congestive heart failure and her Lasix dose was further increased. Lisinopril was discontinued. She has a long history of A. fib with prior rapid ventricular response and continues on atenolol. No tachycardia recently. No palpitations. No recurrence of her chest pain or pressure. No fever sweats or chills. She has diabetes and blood sugars have remained very well controlled on Glucotrol and Glucophage. No polydipsia or polyuria. No symptomatic low readings. She has hypertension and her blood pressures remain well controlled.   Social History     Tobacco Use   Smoking Status Former    Packs/day: 1.00    Years: 20.00    Pack years: 20.00    Types: Cigarettes    Quit date: 3/15/2003    Years since quittin.7   Smokeless Tobacco Never   Allergies:     Actos [pioglitazone hydrochloride] and Levaquin [levofloxacin]    Objective:  /72   Pulse 76   Wt 159 lb (72.1 kg)   SpO2 97%   BMI 27.29 kg/m²    Heart regular rate and rhythm with unchanged holosystolic murmur lungs clear to auscultation abdomen nontender extremities mild edema    Assessment:  1. Nonrheumatic mitral valve regurgitation    2. HOCM (hypertrophic obstructive cardiomyopathy) ([address]    3. Acute congestive heart failure, unspecified heart failure type (HCC)    4. Elevated troponin    5. Atrial fibrillation with RVR ([facility]    6. Type 2 diabetes mellitus with diabetic polyneuropathy, without long-term current use of insulin ([facility]    7. Essential hypertension    8       claustrophobia        Plan:  All hospital records reviewed with patient today  Cardiac MRI has been ordered. She has claustrophobia and will request an open MRI. Offered an anxiolytic prior to MRI test and she declines  She has some metal from prior dental work and will confirm with her dentist if this is MRI safe  Continue with recent medication changes including increased Lasix addition of Eliquis and discontinuation of lisinopril by cardiology  Continue other medications  Continue to monitor blood sugars  Follow-up with other physicians as advised  Follow-up with me in 3 months or as needed  The diagnoses listed in the assessment above are stable unless otherwise indicated. Age-specific preventative health recommendations were reviewed and a \"Healthy Family Handout\" has been provided. Avoid exposure to tobacco products. Follow COVID-19 CDC guidelines. Read and consider all information provided by the pharmacy regarding prescribed medications before use. Call or return for any problems that arise before the next scheduled appointment. The total time listed above may include, but is not limited to: 1. Time before the office visit- reviewing test results, visits with other doctors, and encounters in the ER or hospital since I last saw this patient. 2.Time with the patient in our face-to-face visit including counseling and educating.   3.Time after the office visit- documenting information in the EMR and ordering any necessary tests, medications, referrals, or procedures. Mikayla Kern MD    This note was transcribed using a voice recognition software system. Proper technique and careful oversight were used to increase transcription accuracy but inadvertent errors may be present.

## 2022-12-12 NOTE — PATIENT INSTRUCTIONS
Please read the healthy family handout that you were given and share it with your family. Please compare this printed medication list with your medications at home to be sure they are the same. If you have any medications that are different please contact us immediately at [phone]. Also review your allergies that we have listed, these may also include medications that you have not been able to tolerate, make sure everything listed is correct. If you have any allergies that are different please contact us immediately at [phone]. You may receive a survey in the mail or by email asking about your experience during your visit today. Please complete and return to us so we know how we are serving you.

## 2022-12-15 ENCOUNTER — CARE COORDINATION (OUTPATIENT)
Dept: CASE MANAGEMENT | Age: 77
End: 2022-12-15

## 2022-12-15 NOTE — CARE COORDINATION
[facility] Care Transitions Follow Up Call    Care Transition Nurse contacted the patient by telephone to follow up after admission. Verified name and  with patient as identifiers. Patient: Anna Castillo  Patient : 1945   MRN: [id number]  Reason for Admission: NSTEMI, HOCM (hypertrophic obstructive cardiomyopathy), severe eccentric mitral regurgitation  Discharge Date: 22 RARS: Readmission Risk Score: 11.2      Needs to be reviewed by the provider   Additional needs identified to be addressed with provider: No  none             Method of communication with provider: none. Patient answered call and verified . Patient pleasant and agreeable to transition call. Patient feeling well today. Seen by PCP earlier this week and visit went well. Patient stated that her wrist is feeling better with slight swelling. Patient is trying to increase her routine activities at home. Denied any pain or shortness of breath. Scheduled to have cardiac MRI at [facility]  and then [facility] MD team follow up scheduled  to discuss plan of care. Patient with concerns of MRI machine d/t her claustrophobia. CTN provided patient with [facility] imaging phone number to discuss open MRI option. Stated she would call today for clarification. Denies any acute needs at present time. Agreeable to f/u calls. Educated on the use of urgent care or physicians 24 hr access line if assistance is needed after hours. Addressed changes since last contact:  none  Discussed follow-up appointments. If no appointment was previously scheduled, appointment scheduling offered: Yes. Is follow up appointment scheduled within 7 days of discharge? Yes.     Follow Up  Future Appointments   Date Time Provider Marcos Murphy   2023  2:45 PM Kaushal Ballesteros MD P [facility] CAR [facility]   3/6/2023 10:20 AM [facility] [place] [facility] [place]     Non[facility] follow up appointment(s): [facility]     Care Transition Nurse reviewed medical action plan with patient and family and discussed any barriers to care and/or understanding of plan of care after discharge. Discussed appropriate site of care based on symptoms and resources available to patient including: PCP  Specialist  When to call 911. The patient and family agrees to contact the PCP office for questions related to their healthcare. Advance Care Planning:   not on file. Patients top risk factors for readmission: functional physical ability  Interventions to address risk factors: Education of patient/family/caregiver/guardian to support self-management-     Offered patient enrollment in the Remote Patient Monitoring (RPM) program for in-home monitoring: Yes, but did not enroll at this time. Care Transitions Subsequent and Final Call    Subsequent and Final Calls  Do you have any ongoing symptoms?: No  Have your medications changed?: No  Do you have any questions related to your medications?: No  Do you currently have any active services?: No  Do you have any needs or concerns that I can assist you with?: No  Identified Barriers: None  Care Transitions Interventions  Other Interventions:             Care Transition Nurse provided contact information for future needs. Plan for follow-up call in 7-10 days based on severity of symptoms and risk factors.   Plan for next call: self management-     Conrad Tabor RN

## 2022-12-22 ENCOUNTER — CARE COORDINATION (OUTPATIENT)
Dept: CASE MANAGEMENT | Age: 77
End: 2022-12-22

## 2022-12-22 NOTE — CARE COORDINATION
[facility] Care Transitions Follow Up Call    LPN Care Coordinator contacted the patient by telephone to follow up after admission on -. Verified name and  with patient as identifiers. Patient: Esperanza Martinez  Patient : 1945   MRN: [id number]  Reason for Admission: NSTEMI  Discharge Date: 22 RARS: Readmission Risk Score: 11.2      Needs to be reviewed by the provider   Additional needs identified to be addressed with provider: No  none             Method of communication with provider: none. LPN CC spoke with patient. States she is doing well. Denies CP, SOB, edema, palpitations. Has some sl ROBLEDO after eating. Reports sl knot at wrist r/t cardiac cath. States it is soft, non-tender, denies discoloration. LPN CC suggested icing area. Has BP cuff, has not monitored this week. LPN CC encouraged getting back into routine monitoring. Patient verbalized understanding. Appetite good. Denies problems urinating. Had x1 episode diarrhea last night, states it resolved on its own. Denies needs. Cardiac appts noted below. Juany Marc  [facility]  Care Transitions  [phone]    Addressed changes since last contact:  none  Discussed follow-up appointments. If no appointment was previously scheduled, appointment scheduling offered: Yes. Is follow up appointment scheduled within 7 days of discharge? Yes. Follow Up  Future Appointments   Date Time Provider Marcos Murphy   2023  2:45 PM China Newell MD P [facility]   3/6/2023 10:20 AM Andrew Ramires MD [facility] - [place]     Non[facility] follow up appointment(s): [facility]HER[facility] Care Coordinator reviewed medical action plan and red flags with patient and discussed any barriers to care and/or understanding of plan of care after discharge. Discussed appropriate site of care based on symptoms and resources available to patient including: PCP  Specialist  Urgent care clinics  When to call 911.  The patient agrees to contact the PCP office for questions related to their healthcare. Advance Care Planning:   not on file. Patients top risk factors for readmission: medical condition-NSTEMI  Interventions to address risk factors: Assessment and support for treatment adherence and medication management-denied new or changed meds    Offered patient enrollment in the Remote Patient Monitoring (RPM) program for in-home monitoring:  did not offer this encounter . Care Transitions Subsequent and Final Call    Subsequent and Final Calls  Do you have any ongoing symptoms?: No  Have your medications changed?: No  Do you have any questions related to your medications?: No  Do you currently have any active services?: No  Do you have any needs or concerns that I can assist you with?: No  Identified Barriers: None  Care Transitions Interventions  Other Interventions:             LPN Care Coordinator provided contact information for future needs. Plan for follow-up call in 7-10 days based on severity of symptoms and risk factors. Plan for next call: symptom management-CP, SOB, palpitations  self management-BP   follow-up appointment-cardiac MRI 1/18, cardiac f/u @ [facility] 1/25  medication management-new or changed  RPM?ACP?     Gato Jordan LPN

## 2022-12-29 ENCOUNTER — CARE COORDINATION (OUTPATIENT)
Dept: CASE MANAGEMENT | Age: 77
End: 2022-12-29

## 2022-12-29 RX ORDER — FUROSEMIDE 40 MG/1
40 TABLET ORAL DAILY
Qty: 60 TABLET | Refills: 3 | Status: SHIPPED | OUTPATIENT
Start: 2022-12-29

## 2022-12-29 NOTE — CARE COORDINATION
[facility] Care Transitions Follow Up Call    Care Transition Nurse contacted the patient by telephone to follow up after admission. Verified name and  with patient as identifiers. Patient: Obdulia Lubin  Patient : 1945   MRN: [id number]  Reason for Admission: NSTEMI, HOCM (hypertrophic obstructive cardiomyopathy), severe eccentric mitral regurgitation  Discharge Date: 22 RARS: Readmission Risk Score: 11.2      Needs to be reviewed by the provider   Additional needs identified to be addressed with provider: No  none             Method of communication with provider: none. Patient answered call and verified . Patient pleasant and agreeable to transition call. Patient feeling well and continues to have some wrist soreness, but getting better. Patient seen by PCP since last contact. Patient asking about getting refill prescriptions for apixaban and furosemide. CTN instructed patient to reach out to PCP and request refill. Patient stated that she hasn't met Dr Miguel Ángel Zambrano yet and unsure if he would write prescriptions. Follow up noted in system. Confirmed transportation to visit. Denied any acute needs at present time. Agreeable to f/u calls. Educated on the use of urgent care or physicians 24 hr access line if assistance is needed after hours. Addressed changes since last contact:  none  Discussed follow-up appointments. If no appointment was previously scheduled, appointment scheduling offered: Yes. Is follow up appointment scheduled within 7 days of discharge? No.    Follow Up  Future Appointments   Date Time Provider Marcos Murphy   2023  2:45 Rosi Carmichael MD [facility]   3/6/2023 10:20 AM Malcolm Montague MD [facility] [place]     Non-[facility] follow up appointment(s): Ayl[facility] MOT[facility] team scheduled 23    Care Transition Nurse reviewed discharge instructions with patient and discussed any barriers to care and/or understanding of plan of care after discharge.  Discussed appropriate site of care based on symptoms and resources available to patient including: PCP  Specialist. The patient agrees to contact the PCP office for questions related to their healthcare. Advance Care Planning:   not on file. Patients top risk factors for readmission: functional physical ability  Interventions to address risk factors: Obtained and reviewed discharge summary and/or continuity of care documents    Offered patient enrollment in the Remote Patient Monitoring (RPM) program for in-home monitoring: Yes, but did not enroll at this time. Care Transitions Subsequent and Final Call    Subsequent and Final Calls  Do you have any ongoing symptoms?: No  Have your medications changed?: No  Do you have any questions related to your medications?: No  Do you currently have any active services?: No  Do you have any needs or concerns that I can assist you with?: No  Identified Barriers: None  Care Transitions Interventions  Other Interventions:             Care Transition Nurse provided contact information for future needs. Plan for follow-up call in 7-10 days based on severity of symptoms and risk factors.   Plan for next call: self management-     Matthew Soliz RN

## 2023-01-03 PROBLEM — R79.89 ELEVATED TROPONIN: Status: RESOLVED | Noted: 2022-12-04 | Resolved: 2023-01-03

## 2023-01-03 PROBLEM — R77.8 ELEVATED TROPONIN: Status: RESOLVED | Noted: 2022-12-04 | Resolved: 2023-01-03

## 2023-01-03 NOTE — PROGRESS NOTES
Physician Progress Note      PATIENT:               Prashanth Negron  CSN #:                  [id number]  :                       1945  ADMIT DATE:       2022 10:17 AM  DISCH DATE:        2022 5:08 PM  RESPONDING  PROVIDER #:        Delfin Pendleton MD          QUERY TEXT:    Pt admitted with Afib and has dCHF documented in anesthesia report . If   possible, please document in progress notes and discharge summary further   specificity regarding the acuity of CHF:    The medical record reflects the following:  Risk Factors: dCHF, HTN, HOCM  Clinical Indicators: per anesthesia report -HFpEF signed from prior query   on   LORNA-severe concentric LVH, severe mitral regurg  Treatment: cardiology consult, LHC/LORNA, anesthesia, cont atenolol 25 mg daily   and lasix 40 mg po daily, supportive care    thank you,  Buddy Gibson RN, BSN  [email or web address]  Options provided:  -- Chronic Diastolic CHF/HFpEF  -- Other - I will add my own diagnosis  -- Disagree - Not applicable / Not valid  -- Disagree - Clinically unable to determine / Unknown  -- Refer to Clinical Documentation Reviewer    PROVIDER RESPONSE TEXT:    This patient has chronic diastolic CHF/HFpEF.     Query created by: Deacon Urbina on 1/3/2023 1:19 PM      Electronically signed by:  Delfin Pendleton MD 1/3/2023 2:13 PM

## 2023-01-04 ENCOUNTER — CARE COORDINATION (OUTPATIENT)
Dept: CASE MANAGEMENT | Age: 78
End: 2023-01-04

## 2023-01-04 NOTE — CARE COORDINATION
[facility] Care Transitions Follow Up Call    Care Transition Nurse contacted the patient by telephone to follow up after admission. Verified name and  with patient as identifiers. Patient: Leonor Lamb  Patient : 1945   MRN: [id number]  Reason for Admission:  NSTEMI, HOCM (hypertrophic obstructive cardiomyopathy), severe eccentric mitral regurgitation  Discharge Date: 22 RARS: Readmission Risk Score: 11.2      Needs to be reviewed by the provider   Additional needs identified to be addressed with provider: No  none             Method of communication with provider: none. Patient answered call and verified . Patient pleasant and agreeable to final transition call. Patient feeling well and increasing exercise. Patient scheduled for cardiac MRI 23 at [facility]. Reviewed purpose of test and discussed claustrophobia treatment for test. Appreciative of CTN support and episode ended. Denied any acute needs at present time. Educated on the use of urgent care or physicians 24 hr access line if assistance is needed after hours. Addressed changes since last contact:  none  Discussed follow-up appointments. If no appointment was previously scheduled, appointment scheduling offered: Yes. Is follow up appointment scheduled within 7 days of discharge? No.    Follow Up  Future Appointments   Date Time Provider Marcos Murphy   2023  2:45 PM Tawanna Rene MD P [facility]   3/6/2023 10:20 AM [facility]     [facility] follow up appointment(s):     Care Transition Nurse reviewed medical action plan with patient and discussed any barriers to care and/or understanding of plan of care after discharge. Discussed appropriate site of care based on symptoms and resources available to patient including: PCP  Specialist. The patient agrees to contact the PCP office for questions related to their healthcare. Advance Care Planning:   not on file. Patients top risk factors for readmission: functional physical ability  Interventions to address risk factors: Education of patient/family/caregiver/guardian to support self-management-     Offered patient enrollment in the Remote Patient Monitoring (RPM) program for in-home monitoring: Patient declined. Care Transitions Subsequent and Final Call    Subsequent and Final Calls  Do you have any ongoing symptoms?: No  Have your medications changed?: No  Do you have any questions related to your medications?: No  Do you currently have any active services?: No  Do you have any needs or concerns that I can assist you with?: No  Identified Barriers: None  Care Transitions Interventions  Other Interventions:             Care Transition Nurse provided contact information for future needs. No further follow-up call indicated based on severity of symptoms and risk factors.     Yumiko Mccabe RN

## 2023-01-23 RX ORDER — FUROSEMIDE 40 MG/1
40 TABLET ORAL DAILY
Qty: 90 TABLET | Refills: 1 | Status: SHIPPED | OUTPATIENT
Start: 2023-01-23 | End: 2023-04-23

## 2023-01-23 RX ORDER — ATORVASTATIN CALCIUM 40 MG/1
40 TABLET, FILM COATED ORAL DAILY
Qty: 90 TABLET | Refills: 1 | Status: SHIPPED | OUTPATIENT
Start: 2023-01-23

## 2023-01-25 DIAGNOSIS — I42.1 HOCM (HYPERTROPHIC OBSTRUCTIVE CARDIOMYOPATHY) (HCC): ICD-10-CM

## 2023-01-25 NOTE — PROGRESS NOTES
[facility] Consultation Note  1/30/2023     Subjective:  Ms. Pauly Holm is here for hospital follow up. She was admitted 12/3-12/5/2022 with new onset afib-Eliquis 5 mg BID and dCHF. ECHO 11/10/22 EF>65%; grade II DD elevated filling pressure; YEIMY with severe MR; LVOT obstruction (rest 135 increased 160 with valsalva); mild TR; SPAP 37.  Cath 12/5/2022 noted mild Non obst.CAD, mild left and right sided filling pressure elevation and mild pulmonary HTN; severe MR on TTE. She underwent LORNA 12/7/2022 that demonstrated LVH, severe MR. She has  HOCM, hyperlipidemia, DM, HTN and dCHF. She is feeling well today. Denies chest pain, shortness of breath, edema, dizziness, palpitations and syncope. HPI:   She has appointment with Dr. Justen Quiles to discuss septal ablation vs myomectomy on 1/31/2023. She presents with her  Farhana Howell. Today, she reports she never had problems prior to this testing. She is known to have heart murmur for long time but now worse that led to testing. Patient denies chest pain, sob, palpitations, dizziness or syncope. Review of Systems:12 point ROS negative in all areas as listed below except as in [place]  Constitutional, EENT,pulmonary, GI, , Musculoskeletal, skin, neurological, hematological, endocrine, Psychiatric      Reviewed past medical history, social, and family history.    Parents without heart disease  Father had cancer and passed at 61  Mother passes around 66   Denies alcohol or tobacco use  One son and one daughter have heart murmur out of 5 kids  No sudden death in family  Past Medical History:   Diagnosis Date    CTS (carpal tunnel syndrome)     DM (diabetes mellitus) ([facility] [address]     foot 4/07, micral 4/07    Elevated LFT's     HTN     Hyperlipidemia     neg gxt 11/00    Lung mass     stable    Peripheral neuropathy     Protein in urine      Past Surgical History:   Procedure Laterality Date    HYSTERECTOMY (CERVIX STATUS UNKNOWN)      LUMBAR DISC SURGERY TRE AND BSO (CERVIX REMOVED)         Objective:   /74   Pulse 57   Ht 5' 3.5\" (1.613 m)   Wt 164 lb (74.4 kg)   SpO2 100%   BMI 28.60 kg/m²     Wt Readings from Last 3 Encounters:   01/30/23 164 lb (74.4 kg)   12/12/22 159 lb (72.1 kg)   12/05/22 163 lb 5.8 oz (74.1 kg)       Physical Exam:  General: No Respiratory distress, appears well developed and well nourished. Eyes:  Sclera nonicteric  Nose/Sinuses:  negative findings: nose shows no deformity, asymmetry, or inflammation, nasal mucosa normal, septum midline with no perforation or bleeding  Back:  no pain to palpation  Joint:  no active joint inflammation  Musculoskeletal:  negative  Skin:  Warm and dry  Neck:  Negative for JVD and Carotid Bruits. Chest:  Clear to auscultation, respiration easy  Cardiovascular:  72 irregular, S1S2 normal,  no murmur, no rub or thrill.   Abdomen:  Soft normal liver and spleen  Extremities:   No edema, clubbing, cyanosis,  Pulses:  pedal pulses are normal.  Neuro: intact    Medications:   Outpatient Encounter Medications as of 1/30/2023   Medication Sig Dispense Refill    blood glucose monitor strips Test 1 time a day - E11.9 ( [facility] Blood Glucose Strips) 100 strip 3    blood glucose monitor strips Test 1 times a day  E11.9 ([facility] Blood Glucose Strips) 100 strip 3    atorvastatin (LIPITOR) 40 MG tablet Take 1 tablet by mouth daily 90 tablet 1    SITagliptin (JANUVIA) 50 MG tablet Take 1 tablet by mouth daily 90 tablet 1    apixaban (ELIQUIS) 5 MG TABS tablet Take 1 tablet by mouth 2 times daily 180 tablet 1    furosemide (LASIX) 40 MG tablet Take 1 tablet by mouth daily 90 tablet 1    glipiZIDE (GLUCOTROL) 10 MG tablet TAKE 2 TABLETS TWICE A DAY BEFORE MEALS (Patient taking differently: 10 mg Pt takes 1 tab before each meal) 360 tablet 3    metFORMIN (GLUCOPHAGE) 1000 MG tablet TAKE 1 TABLET TWICE A DAY WITH MEALS 180 tablet 3    atenolol (TENORMIN) 25 MG tablet TAKE 1 TABLET DAILY 90 tablet 3 cyanocobalamin ([employer] VITAMIN B12) 1000 MCG tablet Take 1 tablet by mouth daily 30 tablet 3    Vitamin D (CHOLECALCIFEROL) 1000 UNITS CAPS capsule Take 1,000 Units by mouth daily 1 capsule 0    Ascorbic Acid (VITAMIN C CR) 500 MG TBCR Take  by mouth. aspirin 81 MG EC tablet Take 81 mg by mouth daily. vitamin E 400 UNIT capsule Take 1 capsule by mouth 2 times daily (Patient not taking: Reported on 1/30/2023) 30 capsule 3     No facility-administered encounter medications on file as of 1/30/2023.         Lab Data:  Lab Results   Component Value Date     12/07/2022    K 4.1 12/07/2022     12/07/2022    CO2 25 12/07/2022    BUN 20 12/07/2022    CREATININE 1.0 12/07/2022    GLUCOSE 204 (H) 12/07/2022    CALCIUM 9.4 12/07/2022    PROT 6.8 12/03/2022    LABALBU 3.9 12/07/2022    BILITOT 0.4 12/03/2022    ALKPHOS 57 12/03/2022    AST 14 (L) 12/03/2022    ALT 11 12/03/2022    LABGLOM 58 (A) 12/07/2022    GFRAA >60 06/24/2022    AGRATIO 2.0 12/03/2022    GLOB 3.2 05/27/2016     Lab Results   Component Value Date    WBC 6.6 12/07/2022    HGB 11.2 (L) 12/07/2022    HCT 32.8 (L) 12/07/2022    MCV 92.0 12/07/2022     12/07/2022     Lab Results   Component Value Date    TSH 2.87 01/10/2013    T4FREE 1.1 12/03/2022     LIPID:   Lab Results   Component Value Date    CHOL 166 10/25/2022    CHOL 149 06/24/2022    CHOL 146 12/29/2021     Lab Results   Component Value Date    TRIG 141 10/25/2022    TRIG 233 (H) 06/24/2022    TRIG 183 (H) 12/29/2021     Lab Results   Component Value Date    HDL 37 (L) 10/25/2022    HDL 32 (L) 06/24/2022    HDL 32 (L) 12/29/2021     Lab Results   Component Value Date    LDLCALC 101 (H) 10/25/2022    LDLCALC 70 06/24/2022    LDLCALC 77 12/29/2021     Lab Results   Component Value Date    LABVLDL 28 10/25/2022    LABVLDL 47 06/24/2022    LABVLDL 37 12/29/2021     No results found for: CHOLHDLRATIO    A1C:   Lab Results   Component Value Date    LABA1C 6.3 10/25/2022     BNP:  No results for input(s): BNP in the last 72 hours. IMAGING:   I have reviewed the following tests and documented in this encounter as follows and reviewed with the patient. LORNA 12/6/2022    Summary   Ejection fraction is visually estimated to be 55-60%. There is severe concentric LVH. Turbulent color flow noted in LV outflow tract. Normal RV size and systolic function. The left atrium is dilated. There is no evidence of mass or thrombus in the left atrium or appendage. Mild thickening of leaflets of mitral valve. There appears to be mild prolapse of the anterior mitral valve leaflet. No systolic anterior motion is appreciated. There is at least moderate eccentric and posteriorly directed mitral   regurgitation. The severity of regurgitation is difficult to fully quantify   due to the eccentricity of the regurgitant jet. Recommend obtaining cardiac   MRI for further evaluation. Mild tricuspid and pulmonic regurgitation. A bubble study was performed and shows no evidence of a right-to-left atrial   level shunt. Cardiac Cath 12/5/202  2. Coronary anatomy:  A. Left main artery: The left main artery bifurcates into the left anterior descending artery and left circumflex artery. The left main artery is very short and has no angiographically significant disease. B. Left anterior descending artery: Transapical vessel which gives rise to 4 diagonal arteries. The LAD has a 30% proximal stenosis. The remainder of the vessel has minor luminal irregularities. The first diagonal artery is a medium-large caliber vessel with a high orin and has a 20% ostial stenosis. The second, third, and fourth diagonal arteries are very small. C. Left circumflex artery: Non-dominant vessel that gives rise to 4 obtuse marginal arteries. The Lcx has 20% proximal and 30% mid-vessel stenoses. The OM1 is a small vessel with a high origin and has a 30% proximal stenosis.   OM2 is very small and has minor luminal irregularities. The OM3 is a medium caliber vessel with minor luminal irregularities. The OM4 is a small to medium caliber vessel with minor luminal irregularities. D. Right coronary artery: Dominant vessel that gives rise to the posterior descending artery and 2 posterolateral branches. The proximal RCA has a 20% stenosis. The remainder of the vessel has minor luminal irregularities. The posterior descending artery has minor luminal irregularities. The right posterolateral branches have minor luminal irregularities. Impression:  1. Probable type 2 NSTEMI secondary to demand ischemia from atrial fibrillation with RVR. 2. Mild non-obstructive coronary artery disease. 3. Mildly elevated left-sided cardiac filling pressures. 4. Mildly elevated right-sided cardiac filling pressures. 5. Mild pulmonary hypertension. 6. Low normal Ana cardiac output and index. 7. TTE findings are suggestive of HOCM. There was a peak-to-peak gradient of 59 mmHg on catheter pull-back across the aortic valve. 8. Severe eccentric mitral regurgitation on TTE. 9. Atrial fibrillation. Plan:  1. Can resume SC lovenox 1 mg/kg this evening (atrial fibrillation). 2. Continue aspirin 81 mg daily, atorvastatin 40 mg daily, atenolol 25 mg daily, and lasix 40 mg daily (need to be cautious not to drop preload too drastically in setting of HOCM). 3. Plan for LORNA tomorrow to further evaluate severity and etiology of mitral regurgitation. 4. Pending results of above, can consider CT surgery and/or structural heart team evaluation for more definitive treatment of HOCM and MR. CXR 12/3/2022  Cardiomegaly with increasing interstitial change. Pattern may represent pulmonary edema or chronic interstitial lung disease. Relative stability of incompletely characterized mass in the left lower lung zone since 2016. Lack of significant change would imply a benign granuloma.      EKG 12/3/2022  Atrial fibrillation with rapid ventricular response  Voltage criteria for left ventricular hypertrophy  ST abnormality lateral leads consider ischemia vs repolarization change    Echo 11/10/2022  Left ventricle size is normal with severe concentric left ventricular hypertrophy. Left ventricular systolic function is hyperdynamic with ejection fraction estimated at greater than 65%. No regional wall motion abnormalities are noted. Grade II diastolic dysfunction with elevated filling pressure. There is a late peaking LV outflow tract gradient of 135 mmHg consistent with resting LVOT obstruction. Gradient increases to 160  mmHg with valsalva maneuver. The right ventricle is normal in size and function. The left atrium is severely dilated. There is systolic anterior motion of the mitral valve with consequent severe eccentric mitral regurgitation. Mild tricuspid regurgitation. Normal systolic pulmonary artery pressure (SPAP) estimated at 37 mmHg (RA pressure 3 mmHg). Echo 4/18/2011   Summary-   1. Grade I diastolic dysfunction. 2.  The left ventricle is normal in size, moderate concentric   hypertrophy, and there is overall hyperdynamic systolic function. 3.  The aortic valve is thickened and non-restricted. 4.  Left ventricular ejection fraction is estimated to be 65-70%. 5.  Mild tricuspid regurgitation. 6.  Moderate mitral regurgitation. 7.  Left ventricular outflow tract obstruction. 8.  Systolic anterior motion of the mitral valve. Assessment/Plan:  Deepika Turcios was seen today for new patient, cardiomyopathy, congestive heart failure, atrial fibrillation, hypertension, edema and shortness of breath.     Diagnoses and all orders for this visit:    HOCM (hypertrophic obstructive cardiomyopathy) ([facility] [address]    Essential hypertension    Hypercholesteremia    Atrial fibrillation with RVR ([facility])    NSTEMI (non-ST elevated myocardial infarction) ([facility])        dCHF  -On tenormin 25 mg and Lasix 40 mg   Afib  -On Eliquis 5 mg BID for persistent afib  HTN  - controlled on tenormin 25 mg daily  HOCM By history echo cath and MRI  -on beta blockers  She is seeing Dr Moon Santana tomorrow for structural heart disease consult  I briefly discussed  Mavacamten, alcohol septal ablation and surgery  HLD  -On atorvastatin 40 mg   -Labs from 10/25/2022 reviewed with patient  Severe Mitral regurgitation   -Following with  Appointment with Dr. Justen Quiles 1/31/2023  -Monitor for fainting or dizziness  7. Have her sons and daughter tested for the hypertrophic obstructive heart disease. Plan to follow up in 3 months        QUALITY MEASURES  1. Tobacco Cessation Counseling: NA  2. Retake of BP if >140/90:   NA  3. Documentation to PCP/referring for new patient:  Sent to PCP at close of office visit  4. CAD patient on anti-platelet: asa  5. CAD patient on STATIN therapy:  yes  6. Patient with CHF and aFib on anticoagulation:  Yes       This note was scribed in the presence of Shavonne Drummond MD by Brittney Long RN. I, Dr. Vinita Graham, personally performed the services described in this documentation, as scribed by the above signed scribe in my presence. It is both accurate and complete to my knowledge. I agree with the details independently gathered by the clinical support staff, while the remaining scribed note accurately describes my personal service to the patient.       Shavonne Drummond MD  [facility]   [place], [address] [place] [address] office  [phone] Arely Rags[place] office

## 2023-01-26 RX ORDER — GLUCOSAMINE HCL/CHONDROITIN SU 500-400 MG
CAPSULE ORAL
Qty: 100 STRIP | Refills: 3 | Status: SHIPPED | OUTPATIENT
Start: 2023-01-26

## 2023-01-30 ENCOUNTER — OFFICE VISIT (OUTPATIENT)
Dept: CARDIOLOGY CLINIC | Age: 78
End: 2023-01-30
Payer: MEDICARE

## 2023-01-30 VITALS
SYSTOLIC BLOOD PRESSURE: 128 MMHG | WEIGHT: 164 LBS | HEART RATE: 57 BPM | OXYGEN SATURATION: 100 % | DIASTOLIC BLOOD PRESSURE: 74 MMHG | BODY MASS INDEX: 28 KG/M2 | HEIGHT: 64 IN

## 2023-01-30 DIAGNOSIS — E78.00 HYPERCHOLESTEREMIA: ICD-10-CM

## 2023-01-30 DIAGNOSIS — I21.4 NSTEMI (NON-ST ELEVATED MYOCARDIAL INFARCTION) (HCC): ICD-10-CM

## 2023-01-30 DIAGNOSIS — I10 ESSENTIAL HYPERTENSION: ICD-10-CM

## 2023-01-30 DIAGNOSIS — I42.1 HOCM (HYPERTROPHIC OBSTRUCTIVE CARDIOMYOPATHY) (HCC): Primary | ICD-10-CM

## 2023-01-30 DIAGNOSIS — I48.91 ATRIAL FIBRILLATION WITH RVR (HCC): ICD-10-CM

## 2023-01-30 PROCEDURE — 99214 OFFICE O/P EST MOD 30 MIN: CPT | Performed by: INTERNAL MEDICINE

## 2023-01-30 PROCEDURE — G8417 CALC BMI ABV UP PARAM F/U: HCPCS | Performed by: INTERNAL MEDICINE

## 2023-01-30 PROCEDURE — G8427 DOCREV CUR MEDS BY ELIG CLIN: HCPCS | Performed by: INTERNAL MEDICINE

## 2023-01-30 PROCEDURE — 1123F ACP DISCUSS/DSCN MKR DOCD: CPT | Performed by: INTERNAL MEDICINE

## 2023-01-30 PROCEDURE — 1036F TOBACCO NON-USER: CPT | Performed by: INTERNAL MEDICINE

## 2023-01-30 PROCEDURE — G8399 PT W/DXA RESULTS DOCUMENT: HCPCS | Performed by: INTERNAL MEDICINE

## 2023-01-30 PROCEDURE — 1090F PRES/ABSN URINE INCON ASSESS: CPT | Performed by: INTERNAL MEDICINE

## 2023-01-30 PROCEDURE — G8484 FLU IMMUNIZE NO ADMIN: HCPCS | Performed by: INTERNAL MEDICINE

## 2023-01-30 PROCEDURE — 3078F DIAST BP <80 MM HG: CPT | Performed by: INTERNAL MEDICINE

## 2023-01-30 PROCEDURE — 3074F SYST BP LT 130 MM HG: CPT | Performed by: INTERNAL MEDICINE

## 2023-02-02 NOTE — TELEPHONE ENCOUNTER
Pt is needing 90days sent to [email or web address] b/c the cost is so much cheaper.    She is almost out, so she'll fill a couple of weeks worth at the local [facility].

## 2023-02-06 ENCOUNTER — TELEPHONE (OUTPATIENT)
Dept: CARDIOLOGY CLINIC | Age: 78
End: 2023-02-06

## 2023-02-06 RX ORDER — ATENOLOL 25 MG/1
25 TABLET ORAL DAILY
Qty: 90 TABLET | Refills: 1 | Status: SHIPPED | OUTPATIENT
Start: 2023-02-06

## 2023-02-06 RX ORDER — ATORVASTATIN CALCIUM 40 MG/1
40 TABLET, FILM COATED ORAL DAILY
Qty: 90 TABLET | Refills: 1 | Status: SHIPPED | OUTPATIENT
Start: 2023-02-06

## 2023-02-06 NOTE — TELEPHONE ENCOUNTER
----- Message from Radha Cazares DO sent at 2/5/2023 10:39 AM EST -----  Please let patient know that cardiac MRI findings are consistent with HOCM. Degree of of mitral regurgitation was not fully quantified on study. She has follow-up scheduled with structural heart team at [facility] for further consideration of alcohol septal ablation versus surgical myomyectomy. There was additionally noted to be a trivial amount of fluid around the heart and a small left lower lobe lung mass that was seen on her previous CT from 2016. Close follow-up with her PCP is recommended for further evaluation and monitoring of this mass.

## 2023-02-06 NOTE — TELEPHONE ENCOUNTER
Called and spoke with patient. Relayed [facility] results messages regarding cardiac MRI. She VU. She states she has appointment with structural heart team at the [facility] 02/21/23. She VU to follow with PCP in regards to lung mass.

## 2023-02-22 RX ORDER — FUROSEMIDE 40 MG/1
TABLET ORAL
Qty: 60 TABLET | Refills: 3 | Status: SHIPPED | OUTPATIENT
Start: 2023-02-22

## 2023-02-23 ENCOUNTER — TELEPHONE (OUTPATIENT)
Dept: FAMILY MEDICINE CLINIC | Age: 78
End: 2023-02-23

## 2023-03-01 ENCOUNTER — TELEPHONE (OUTPATIENT)
Dept: CARDIOLOGY CLINIC | Age: 78
End: 2023-03-01

## 2023-03-06 ENCOUNTER — OFFICE VISIT (OUTPATIENT)
Dept: FAMILY MEDICINE CLINIC | Age: 78
End: 2023-03-06
Payer: MEDICARE

## 2023-03-06 VITALS
BODY MASS INDEX: 28.07 KG/M2 | WEIGHT: 161 LBS | HEART RATE: 83 BPM | DIASTOLIC BLOOD PRESSURE: 81 MMHG | OXYGEN SATURATION: 97 % | SYSTOLIC BLOOD PRESSURE: 144 MMHG

## 2023-03-06 DIAGNOSIS — I42.1 HOCM (HYPERTROPHIC OBSTRUCTIVE CARDIOMYOPATHY) (HCC): Primary | ICD-10-CM

## 2023-03-06 DIAGNOSIS — E78.00 HYPERCHOLESTEREMIA: ICD-10-CM

## 2023-03-06 DIAGNOSIS — I34.0 NONRHEUMATIC MITRAL VALVE REGURGITATION: ICD-10-CM

## 2023-03-06 DIAGNOSIS — I48.91 ATRIAL FIBRILLATION WITH RVR (HCC): ICD-10-CM

## 2023-03-06 DIAGNOSIS — R79.89 ELEVATED TSH: ICD-10-CM

## 2023-03-06 DIAGNOSIS — E11.42 TYPE 2 DIABETES MELLITUS WITH DIABETIC POLYNEUROPATHY, WITHOUT LONG-TERM CURRENT USE OF INSULIN (HCC): ICD-10-CM

## 2023-03-06 DIAGNOSIS — I10 ESSENTIAL HYPERTENSION: ICD-10-CM

## 2023-03-06 LAB
ALT SERPL-CCNC: 10 U/L (ref 10–40)
ANION GAP SERPL CALCULATED.3IONS-SCNC: 10 MMOL/L (ref 3–16)
BUN BLDV-MCNC: 12 MG/DL (ref 7–20)
CALCIUM SERPL-MCNC: 9.8 MG/DL (ref 8.3–10.6)
CHLORIDE BLD-SCNC: 98 MMOL/L (ref 99–110)
CHOLESTEROL, TOTAL: 127 MG/DL (ref 0–199)
CO2: 28 MMOL/L (ref 21–32)
CREAT SERPL-MCNC: 0.9 MG/DL (ref 0.6–1.2)
GFR SERPL CREATININE-BSD FRML MDRD: >60 ML/MIN/{1.73_M2}
GLUCOSE BLD-MCNC: 139 MG/DL (ref 70–99)
HDLC SERPL-MCNC: 38 MG/DL (ref 40–60)
LDL CHOLESTEROL CALCULATED: 68 MG/DL
POTASSIUM SERPL-SCNC: 3.9 MMOL/L (ref 3.5–5.1)
SODIUM BLD-SCNC: 136 MMOL/L (ref 136–145)
TRIGL SERPL-MCNC: 105 MG/DL (ref 0–150)
TSH REFLEX: 2.6 UIU/ML (ref 0.27–4.2)
VLDLC SERPL CALC-MCNC: 21 MG/DL

## 2023-03-06 PROCEDURE — G8417 CALC BMI ABV UP PARAM F/U: HCPCS | Performed by: FAMILY MEDICINE

## 2023-03-06 PROCEDURE — 3079F DIAST BP 80-89 MM HG: CPT | Performed by: FAMILY MEDICINE

## 2023-03-06 PROCEDURE — G8484 FLU IMMUNIZE NO ADMIN: HCPCS | Performed by: FAMILY MEDICINE

## 2023-03-06 PROCEDURE — 3077F SYST BP >= 140 MM HG: CPT | Performed by: FAMILY MEDICINE

## 2023-03-06 PROCEDURE — 36415 COLL VENOUS BLD VENIPUNCTURE: CPT | Performed by: FAMILY MEDICINE

## 2023-03-06 PROCEDURE — G8427 DOCREV CUR MEDS BY ELIG CLIN: HCPCS | Performed by: FAMILY MEDICINE

## 2023-03-06 PROCEDURE — 1036F TOBACCO NON-USER: CPT | Performed by: FAMILY MEDICINE

## 2023-03-06 PROCEDURE — 1123F ACP DISCUSS/DSCN MKR DOCD: CPT | Performed by: FAMILY MEDICINE

## 2023-03-06 PROCEDURE — 99215 OFFICE O/P EST HI 40 MIN: CPT | Performed by: FAMILY MEDICINE

## 2023-03-06 PROCEDURE — G8399 PT W/DXA RESULTS DOCUMENT: HCPCS | Performed by: FAMILY MEDICINE

## 2023-03-06 PROCEDURE — 1090F PRES/ABSN URINE INCON ASSESS: CPT | Performed by: FAMILY MEDICINE

## 2023-03-06 RX ORDER — ATENOLOL 50 MG/1
TABLET ORAL
COMMUNITY
Start: 2023-02-21

## 2023-03-06 ASSESSMENT — PATIENT HEALTH QUESTIONNAIRE - PHQ9
SUM OF ALL RESPONSES TO PHQ QUESTIONS 1-9: 0
2. FEELING DOWN, DEPRESSED OR HOPELESS: 0
SUM OF ALL RESPONSES TO PHQ9 QUESTIONS 1 & 2: 0
SUM OF ALL RESPONSES TO PHQ QUESTIONS 1-9: 0
1. LITTLE INTEREST OR PLEASURE IN DOING THINGS: 0
SUM OF ALL RESPONSES TO PHQ QUESTIONS 1-9: 0
SUM OF ALL RESPONSES TO PHQ QUESTIONS 1-9: 0

## 2023-03-06 NOTE — PROGRESS NOTES
Total time spent on the day of this patient's office encounter (including before, during, and after the face-to-face visit): 44 minutes    Subjective:  Christie Smith is here to discuss the following issues. She has hypertrophic obstructive cardiomyopathy which has progressed to a point that she has been talking with [facility] cardiologists about surgical intervention versus other alternatives for treatment. She also has mitral regurgitation left atrial enlargement and atrial fibrillation. She has an appointment tomorrow with the surgeon. 3 modalities have been considered for treatment of her HOCM. Medication which is not indicated in her case. Alcohol ablation which is also not appropriate in her specific situation. Open surgical myomectomy and valve replacement which will be discussed further tomorrow with cardiac surgeon  [facility] [place]. Apparently the her problems were first identified in an ultrasound about 10 years ago but symptoms have been slowly progressing. She has atrial fibrillation and continues on Eliquis and will rarely have some palpitations. She has no abnormal bleeding. She has diabetes and on a combination of medicines her blood sugars have been well controlled with no polydipsia or polyuria. She has essential hypertension and typically blood pressures are very well controlled on her regimen of medicine. She has elevated cholesterol and is taking Lipitor and has no muscle aches or other side effects.   She has a history of elevated TSH  Social History     Tobacco Use   Smoking Status Former    Packs/day: 1.00    Years: 20.00    Pack years: 20.00    Types: Cigarettes    Quit date: 3/15/2003    Years since quittin.9   Smokeless Tobacco Never   Allergies:     Actos [pioglitazone hydrochloride] and Levaquin [levofloxacin]    Objective:  BP (!) 144/81   Pulse 83   Wt 161 lb (73 kg)   SpO2 97%   BMI 28.07 kg/m²    No acute distress, heart regular rate and rhythm without murmur, lungs clear to auscultation easy effort, abdomen nondistended, no clubbing or cyanosis    Assessment:  1. HOCM (hypertrophic obstructive cardiomyopathy) ([address]    2. Nonrheumatic mitral valve regurgitation    3. Atrial fibrillation with RVR ([address]    4. Type 2 diabetes mellitus with diabetic polyneuropathy, without long-term current use of insulin ([address]    5. Essential hypertension    6. Hypercholesteremia    7. Elevated TSH            Plan:  Before her visit I reviewed the findings and recommendations of her different [facility] cardiology visits then discussed this information with the patient and her . Labs ordered  Continue plans to meet with cardiac surgeon Dr. Elvin Rao tomorrow. It is most likely that she will ultimately require myomectomy and valve repair or replacement  She is considering cardioversion for A-fib  Continue current medicines  Follow-up in 2 months or as needed  The diagnoses listed in the assessment above are stable unless otherwise indicated. Age-specific preventative health recommendations were reviewed and a \"Healthy Family Handout\" has been provided. Avoid exposure to tobacco products. Follow COVID-19 CDC guidelines. Read and consider all information provided by the pharmacy regarding prescribed medications before use. Call or return for any problems that arise before the next scheduled appointment. The total time listed above may include, but is not limited to: 1. Time before the office visit- reviewing test results, visits with other doctors, and encounters in the ER or hospital since I last saw this patient. 2.Time with the patient in our face-to-face visit including counseling and educating. 3.Time after the office visit- documenting information in the EMR and ordering any necessary tests, medications, referrals, or procedures. Heath Brooks MD    This note was transcribed using a voice recognition software system.   Proper technique and careful oversight were used to increase transcription accuracy but inadvertent errors may be present.

## 2023-03-06 NOTE — PATIENT INSTRUCTIONS
Please read the healthy family handout that you were given and share it with your family. Please compare this printed medication list with your medications at home to be sure they are the same. If you have any medications that are different please contact us immediately at [phone]. Also review your allergies that we have listed, these may also include medications that you have not been able to tolerate, make sure everything listed is correct. If you have any allergies that are different please contact us immediately at [phone]. You may receive a survey in the mail or by email asking about your experience during your visit today. Please complete and return to us so we know how we are serving you.

## 2023-03-07 ENCOUNTER — TELEPHONE (OUTPATIENT)
Dept: CARDIOLOGY CLINIC | Age: 78
End: 2023-03-07

## 2023-03-07 LAB
ESTIMATED AVERAGE GLUCOSE: 139.9 MG/DL
HBA1C MFR BLD: 6.5 %

## 2023-03-07 NOTE — TELEPHONE ENCOUNTER
Last OV 1/30/23 *DOES PT NEED TO BRIDGE?*     dCHF  -On tenormin 25 mg and Lasix 40 mg   Afib  -On Eliquis 5 mg BID for persistent afib  HTN  - controlled on tenormin 25 mg daily  HOCM By history echo cath and MRI  -on beta blockers  She is seeing Dr Emily Guan tomorrow for structural heart disease consult  I briefly discussed  Mavacamten, alcohol septal ablation and surgery  HLD  -On atorvastatin 40 mg   -Labs from 10/25/2022 reviewed with patient  Severe Mitral regurgitation              -Following with  Appointment with Dr. Zachary Mane 1/31/2023  -Monitor for fainting or dizziness  7. Have her sons and daughter tested for the hypertrophic obstructive heart disease.   Plan to follow up in 3 months

## 2023-03-07 NOTE — LETTER
[address] [facility] [address], [address] [facility] [address] [place] [address]  Phone: [phone]  Fax: [phone]    Woodrow Corona MD        March 8, 2023      [place] 1945 is intermediate risk for procedure may hold blood thinners for 5 days before procedure.            Sincerely,        Woodrow Corona MD

## 2023-03-07 NOTE — TELEPHONE ENCOUNTER
CARDIAC CLEARANCE REQUEST    What type of procedure are you having:  alcohol septal ablation  Are you taking any blood thinners:  Eliquis-hold 5 days prior-wants to know if pt needs to be bridged? Type on anesthesia:  General  When is your procedure scheduled for:  04/25/2023  What physician is performing your procedure:  Dr. Zaida Aguirre  Phone Number:  [phone]  Fax number to send the letter:  [phone]    Last ov 01/30/2023 soledad. Upcoming ov 06/08/2022 soledad.

## 2023-03-23 ENCOUNTER — TELEMEDICINE (OUTPATIENT)
Dept: FAMILY MEDICINE CLINIC | Age: 78
End: 2023-03-23

## 2023-03-23 DIAGNOSIS — Z00.00 MEDICARE ANNUAL WELLNESS VISIT, SUBSEQUENT: Primary | ICD-10-CM

## 2023-03-23 SDOH — ECONOMIC STABILITY: FOOD INSECURITY: WITHIN THE PAST 12 MONTHS, THE FOOD YOU BOUGHT JUST DIDN'T LAST AND YOU DIDN'T HAVE MONEY TO GET MORE.: NEVER TRUE

## 2023-03-23 SDOH — ECONOMIC STABILITY: FOOD INSECURITY: WITHIN THE PAST 12 MONTHS, YOU WORRIED THAT YOUR FOOD WOULD RUN OUT BEFORE YOU GOT MONEY TO BUY MORE.: NEVER TRUE

## 2023-03-23 SDOH — ECONOMIC STABILITY: HOUSING INSECURITY
IN THE LAST 12 MONTHS, WAS THERE A TIME WHEN YOU DID NOT HAVE A STEADY PLACE TO SLEEP OR SLEPT IN A SHELTER (INCLUDING NOW)?: NO

## 2023-03-23 SDOH — ECONOMIC STABILITY: INCOME INSECURITY: HOW HARD IS IT FOR YOU TO PAY FOR THE VERY BASICS LIKE FOOD, HOUSING, MEDICAL CARE, AND HEATING?: NOT HARD AT ALL

## 2023-03-23 ASSESSMENT — PATIENT HEALTH QUESTIONNAIRE - PHQ9
SUM OF ALL RESPONSES TO PHQ QUESTIONS 1-9: 0
SUM OF ALL RESPONSES TO PHQ9 QUESTIONS 1 & 2: 0
SUM OF ALL RESPONSES TO PHQ QUESTIONS 1-9: 0
1. LITTLE INTEREST OR PLEASURE IN DOING THINGS: 0
SUM OF ALL RESPONSES TO PHQ QUESTIONS 1-9: 0
2. FEELING DOWN, DEPRESSED OR HOPELESS: 0
SUM OF ALL RESPONSES TO PHQ QUESTIONS 1-9: 0

## 2023-03-23 ASSESSMENT — LIFESTYLE VARIABLES
HOW MANY STANDARD DRINKS CONTAINING ALCOHOL DO YOU HAVE ON A TYPICAL DAY: PATIENT DOES NOT DRINK
HOW OFTEN DO YOU HAVE A DRINK CONTAINING ALCOHOL: NEVER

## 2023-03-23 NOTE — PROGRESS NOTES
This encounter was performed under my, Farzana Amaya, direct supervision, 3/23/2023.
direct supervision of the attending physician. Blaine Ramos, was evaluated through a synchronous (real-time) telephone encounter. The patient (or guardian if applicable) is aware that this is a billable service, which includes applicable co-pays. This Virtual Visit was conducted with patient's (and/or legal guardian's) consent. The visit was conducted pursuant to the emergency declaration under the [address] [place] [address] authority and the [facility] and [employer] General Act. Patient identification was verified, and a caregiver was present when appropriate.    The patient was located at Home: [address] [facility]  [address] [facility] [address]  Provider was located at [facility] ([address] [place] [address] Street Dept): Ivet Corbett [address] [place] [address] 13,  [address] [facility] [address] Kurtis Cordero, [place]

## 2023-03-23 NOTE — PATIENT INSTRUCTIONS
Personalized Preventive Plan for Mde Elise - 3/23/2023  Medicare offers a range of preventive health benefits. Some of the tests and screenings are paid in full while other may be subject to a deductible, co-insurance, and/or copay. Some of these benefits include a comprehensive review of your medical history including lifestyle, illnesses that may run in your family, and various assessments and screenings as appropriate. After reviewing your medical record and screening and assessments performed today your provider may have ordered immunizations, labs, imaging, and/or referrals for you. A list of these orders (if applicable) as well as your Preventive Care list are included within your After Visit Summary for your review. Other Preventive Recommendations:    A preventive eye exam performed by an eye specialist is recommended every 1-2 years to screen for glaucoma; cataracts, macular degeneration, and other eye disorders. A preventive dental visit is recommended every 6 months. Try to get at least 150 minutes of exercise per week or 10,000 steps per day on a pedometer . Order or download the FREE \"Exercise & Physical Activity: Your Everyday Guide\" from [employer]. Call 3-906.708.3757 or search [employer] online. You need 1661-3648 mg of calcium and 8613-8473 IU of vitamin D per day. It is possible to meet your calcium requirement with diet alone, but a vitamin D supplement is usually necessary to meet this goal.  When exposed to the sun, use a sunscreen that protects against both UVA and UVB radiation with an SPF of 30 or greater. Reapply every 2 to 3 hours or after sweating, drying off with a towel, or swimming. Always wear a seat belt when traveling in a car. Always wear a helmet when riding a bicycle or motorcycle.

## 2023-04-20 RX ORDER — ATENOLOL 50 MG/1
50 TABLET ORAL DAILY
Qty: 90 TABLET | Refills: 1 | Status: SHIPPED | OUTPATIENT
Start: 2023-04-20 | End: 2023-07-19

## 2023-05-09 NOTE — TELEPHONE ENCOUNTER
Recommend she try over-the-counter Robitussin. I do not recommend she come in on 6/16/2022 since she is still symptomatic. ambulatory

## 2023-06-05 ENCOUNTER — TELEPHONE (OUTPATIENT)
Dept: FAMILY MEDICINE CLINIC | Age: 78
End: 2023-06-05

## 2023-06-05 NOTE — TELEPHONE ENCOUNTER
Patient was calling about her Januvia dose. She said when she was in [facility] they increased her to 100 mg she is needing refill on this if you want her to continue this dose.   [facility]

## 2023-06-09 ENCOUNTER — NURSE ONLY (OUTPATIENT)
Dept: FAMILY MEDICINE CLINIC | Age: 78
End: 2023-06-09

## 2023-06-09 VITALS — SYSTOLIC BLOOD PRESSURE: 142 MMHG | DIASTOLIC BLOOD PRESSURE: 80 MMHG

## 2023-06-20 ENCOUNTER — OFFICE VISIT (OUTPATIENT)
Dept: FAMILY MEDICINE CLINIC | Age: 78
End: 2023-06-20

## 2023-06-20 VITALS
SYSTOLIC BLOOD PRESSURE: 112 MMHG | BODY MASS INDEX: 27.03 KG/M2 | TEMPERATURE: 98.8 F | OXYGEN SATURATION: 93 % | DIASTOLIC BLOOD PRESSURE: 70 MMHG | WEIGHT: 155 LBS | HEART RATE: 80 BPM

## 2023-06-20 DIAGNOSIS — R07.81 CHEST PAIN, PLEURITIC: Primary | ICD-10-CM

## 2023-06-20 RX ORDER — ACETAMINOPHEN 500 MG
1000 TABLET ORAL EVERY 6 HOURS
COMMUNITY
Start: 2023-05-05

## 2023-06-20 RX ORDER — SPIRONOLACTONE 50 MG/1
50 TABLET, FILM COATED ORAL DAILY
COMMUNITY
Start: 2023-06-08

## 2023-06-20 ASSESSMENT — ENCOUNTER SYMPTOMS
GASTROINTESTINAL NEGATIVE: 1
SHORTNESS OF BREATH: 0
CHEST TIGHTNESS: 1

## 2023-06-20 NOTE — PROGRESS NOTES
CHIEF COMPLAINT  Chief Complaint   Patient presents with    Other     ? Pleurisy         HPI   Grey Cooper is a 69 y.o. female who presents to the office complaining of left anterior chest discomfort. Patient reports that symptoms are similar to previous episodes of pleurisy. Patient reports that she experienced symptoms after her surgery and was told to take Tylenol which did help with her symptoms. Patient ports she been off of Tylenol and since then noticed increase in pain. Patient is any changes in activity or increase in strenuous movements. Patient denies any shortness of breath but does report pain with inspiration. No episodes of dizziness, lightheadedness, near syncope. Patient denies any cough, congestion, fever or chills. Patient reports that she called her cardiologist and was instructed on see PCP for evaluation. Patient reports that she did take Tylenol around noon today which did help with her symptoms. No other complaints, modifying factors or associated symptoms. Nursing notes reviewed.    Past Medical History:   Diagnosis Date    CTS (carpal tunnel syndrome)     DM (diabetes mellitus) ([facility] [address]     foot 4/07, micral 4/07    Elevated LFT's     HTN     Hyperlipidemia     neg gxt 11/00    Lung mass     stable    Peripheral neuropathy     Protein in urine      Past Surgical History:   Procedure Laterality Date    HYSTERECTOMY (CERVIX STATUS UNKNOWN)      LUMBAR DISC SURGERY      TRE AND BSO (CERVIX REMOVED)       Family History   Problem Relation Age of Onset    Cancer Father      Social History     Socioeconomic History    Marital status:      Spouse name: Not on file    Number of children: Not on file    Years of education: Not on file    Highest education level: Not on file   Occupational History    Not on file   Tobacco Use    Smoking status: Former     Packs/day: 1.00     Years: 20.00     Pack years: 20.00     Types: Cigarettes     Quit date: 3/15/2003     Years since

## 2023-06-20 NOTE — PATIENT INSTRUCTIONS
Please read the healthy family handout that you were given and share it with your family. Please compare this printed medication list with your medications at home to be sure they are the same. If you have any medications that are different please contact us immediately at [phone]. Also review your allergies that we have listed, these may also include medications that you have not been able to tolerate, make sure everything listed is correct. If you have any allergies that are different please contact us immediately at [phone]. You may receive a survey in the mail or by email asking about your experience during your visit today. Please complete and return to us so we know how we are serving you.

## 2023-06-21 ENCOUNTER — HOSPITAL ENCOUNTER (OUTPATIENT)
Age: 78
Discharge: HOME | End: 2023-06-21
Payer: MEDICARE

## 2023-06-21 ENCOUNTER — HOSPITAL ENCOUNTER (OUTPATIENT)
Dept: GENERAL RADIOLOGY | Age: 78
Discharge: HOME | End: 2023-06-21
Payer: MEDICARE

## 2023-06-21 DIAGNOSIS — R07.81 CHEST PAIN, PLEURITIC: ICD-10-CM

## 2023-06-21 DIAGNOSIS — J90 PLEURAL EFFUSION: Primary | ICD-10-CM

## 2023-06-21 PROCEDURE — 71046 X-RAY EXAM CHEST 2 VIEWS: CPT

## 2023-06-21 RX ORDER — AZITHROMYCIN 250 MG/1
250 TABLET, FILM COATED ORAL SEE ADMIN INSTRUCTIONS
Qty: 6 TABLET | Refills: 0 | Status: SHIPPED | OUTPATIENT
Start: 2023-06-21 | End: 2023-06-26

## 2023-07-05 ENCOUNTER — HOSPITAL ENCOUNTER (OUTPATIENT)
Dept: CARDIAC REHAB | Age: 78
Setting detail: THERAPIES SERIES
Discharge: HOME | End: 2023-07-05
Payer: MEDICARE

## 2023-07-05 VITALS — HEIGHT: 64 IN | WEIGHT: 152 LBS | BODY MASS INDEX: 25.95 KG/M2

## 2023-07-05 PROCEDURE — 93798 PHYS/QHP OP CAR RHAB W/ECG: CPT

## 2023-07-05 ASSESSMENT — PATIENT HEALTH QUESTIONNAIRE - PHQ9
SUM OF ALL RESPONSES TO PHQ QUESTIONS 1-9: 0
5. POOR APPETITE OR OVEREATING: 0
SUM OF ALL RESPONSES TO PHQ QUESTIONS 1-9: 0
3. TROUBLE FALLING OR STAYING ASLEEP: 0
SUM OF ALL RESPONSES TO PHQ QUESTIONS 1-9: 0
6. FEELING BAD ABOUT YOURSELF - OR THAT YOU ARE A FAILURE OR HAVE LET YOURSELF OR YOUR FAMILY DOWN: 0
SUM OF ALL RESPONSES TO PHQ9 QUESTIONS 1 & 2: 0
SUM OF ALL RESPONSES TO PHQ QUESTIONS 1-9: 0
10. IF YOU CHECKED OFF ANY PROBLEMS, HOW DIFFICULT HAVE THESE PROBLEMS MADE IT FOR YOU TO DO YOUR WORK, TAKE CARE OF THINGS AT HOME, OR GET ALONG WITH OTHER PEOPLE: 0
1. LITTLE INTEREST OR PLEASURE IN DOING THINGS: 0
2. FEELING DOWN, DEPRESSED OR HOPELESS: 0
9. THOUGHTS THAT YOU WOULD BE BETTER OFF DEAD, OR OF HURTING YOURSELF: 0
8. MOVING OR SPEAKING SO SLOWLY THAT OTHER PEOPLE COULD HAVE NOTICED. OR THE OPPOSITE, BEING SO FIGETY OR RESTLESS THAT YOU HAVE BEEN MOVING AROUND A LOT MORE THAN USUAL: 0
4. FEELING TIRED OR HAVING LITTLE ENERGY: 0
7. TROUBLE CONCENTRATING ON THINGS, SUCH AS READING THE NEWSPAPER OR WATCHING TELEVISION: 0

## 2023-07-05 ASSESSMENT — EJECTION FRACTION: EF_VALUE: 50

## 2023-07-05 ASSESSMENT — LIFESTYLE VARIABLES
SMOKELESS_TOBACCO: NO
CIGARETTES_PER_DAY: 20

## 2023-07-05 ASSESSMENT — EXERCISE STRESS TEST
PEAK_RPE: 12
PEAK_RPD: 3
PEAK_HR: 87
PEAK_BP: 136/74

## 2023-07-05 NOTE — PLAN OF CARE
Cardiopulmonary Rehab   Initial Treatment Plan   Name: Lizzette Galdamez Admit to Rehab: 2023   : 1945 Primary Diagnosis: Valve    Age: 69 y.o. Referring Physician:  Sanya Lino   MRN: [id number] Primary Care Physician: Marj Tucker MD     Treatment Diagnosis  Treatment Diagnosis 1: Valve  Valve Date: 23  Significant Cardiovascular History: Chronic atrial fibrillation; History of heart failure  Co-morbidities: Diabetes    Individual Treatment Plan  ITP Visit Type: Initial assessment  ITP Next Review Date: 23 ([id number] [facility]  Visit #/Total Visits:   EF%: 50 %  Risk Stratification: Moderate    Exercise   Stages of Change: Preparation  Assisted Devices: None  Harmon Total Score: 0    Data Measured Before Walk  Heart Rate: 73  Blood Pressure: 118/64  O2 Saturation: 97  O2 Device: Room air    Data Measured during Walk  Any problems while exercising: hip discomfort  Describe Type of Pain You Are Having: Hips ache with activity  Peak RPE: 12  Peak RPD: 3    Data Measured Immediately After Walk  Distance Walked in : ft  Distance Walked (ft): 1150 ft  Peak Heart Rate: 87  Peak Blood Pressure: 136/74  Modified Carmella Scale: 3  O2 Saturation: 94    Data Measured at 5 Minutes After Walk  Heart Rate: 72  Blood Pressure: 120/78  SpO2: 99 %  O2 Device: Room air    Exercise Prescription  Mode: Treadmill; Bike; Stepper; Ergometer;  Rower; Elliptical  Frequency per week: 3-5  Duration Per Session: 32+  RPE: 11-14  Resistance Training: Yes    Exercise Activity at Home  Type: Walking  Frequency: 2-3 days weekly  Duration: 30+ minutes  Resistance Training: Yes    Exercise Education  Education: Exercise safety; Signs/symptoms to report; RPE scale    Psychosocial  Stages of Change: Preparation  [facility] Total Score: 21  PHQ-9 Total Score: 0    Psychosocial Intervention  Interventions: No intervention indicated  Currently Taking Psychotropic Meds: No  Medication Changes: Yes    Psychosocial

## 2023-07-05 NOTE — PLAN OF CARE
Pulmonary Rehab Treatment Plan   Name: Dona Lazo Admit to Rehab: 2023   : 1945 Primary Diagnosis:     Age: 69 y.o. Referring Physician: Na Gordon   MRN: [id number] Primary Care Physician: Dawson Mohamud MD       Treatment Diagnosis  Significant Cardiovascular History: Chronic atrial fibrillation; History of heart failure  Co-morbidities: Diabetes    Individual Treatment Plan  ITP Visit Type: Initial assessment  ITP Next Review Date: 23 ([facility])  Visit #/Total Visits:   EF%: 50 %  Risk Stratification: Moderate       EXERCISE     Data Measured Before Walk  Heart Rate: 73  Blood Pressure: 118/64  O2 Saturation: 97  O2 Device: Room air    Data Measured during Walk  Any problems while exercising: hip discomfort  Describe Type of Pain You Are Having: Hips ache with activity  Peak RPE: 12  Peak RPD: 3    Data Measured Immediately After Walk  Distance Walked in : ft  Distance Walked (ft): 1150 ft  Peak Heart Rate: 87  Peak Blood Pressure: 136/74  Modified Carmella Scale: 3  O2 Saturation: 94    Data Measured at 5 Minutes After Walk  Heart Rate: 72  Blood Pressure: 120/78  SpO2: 99 %  O2 Device: Room air    Exercise Prescription  Mode: Treadmill; Bike; Stepper; Ergometer; Rower; Elliptical  Frequency per week: 3-5  Duration Per Session: 32+  O2 Device: Room air  Resistance Training: Yes  Assisted Devices: None    Exercise Intervention  Type: Walking  Frequency: 2-3 days weekly  Duration: 30+ minutes  Resistance Training: Yes    Exercise Intervention  Type: Walking  Frequency: 2-3 days weekly  Duration: 30+ minutes  Resistance Training: Yes    Exercise Target Group  Patient Stated Exercise Goals:  Increase daily activity    PSYCHOSOCIAL    Psychosocial  Stages of Change: Preparation  [facility] Total Score: 21    Psychosocial Intervention  Interventions: No intervention indicated  Currently Taking Psychotropic Meds: No  Medication Changes: Yes    Psychosocial Target

## 2023-07-10 ENCOUNTER — HOSPITAL ENCOUNTER (OUTPATIENT)
Dept: CARDIAC REHAB | Age: 78
Setting detail: THERAPIES SERIES
Discharge: HOME | End: 2023-07-10
Payer: MEDICARE

## 2023-07-10 PROCEDURE — 93798 PHYS/QHP OP CAR RHAB W/ECG: CPT

## 2023-07-12 ENCOUNTER — HOSPITAL ENCOUNTER (OUTPATIENT)
Dept: CARDIAC REHAB | Age: 78
Setting detail: THERAPIES SERIES
Discharge: HOME | End: 2023-07-12
Payer: MEDICARE

## 2023-07-12 PROCEDURE — 93798 PHYS/QHP OP CAR RHAB W/ECG: CPT

## 2023-07-14 ENCOUNTER — HOSPITAL ENCOUNTER (OUTPATIENT)
Dept: CARDIAC REHAB | Age: 78
Setting detail: THERAPIES SERIES
Discharge: HOME | End: 2023-07-14
Payer: MEDICARE

## 2023-07-14 PROCEDURE — 93798 PHYS/QHP OP CAR RHAB W/ECG: CPT

## 2023-07-17 ENCOUNTER — HOSPITAL ENCOUNTER (OUTPATIENT)
Dept: CARDIAC REHAB | Age: 78
Setting detail: THERAPIES SERIES
Discharge: HOME | End: 2023-07-17
Payer: MEDICARE

## 2023-07-17 LAB
GLUCOSE BLD-MCNC: 207 MG/DL (ref 70–99)
PERFORMED ON: ABNORMAL

## 2023-07-17 PROCEDURE — 93798 PHYS/QHP OP CAR RHAB W/ECG: CPT

## 2023-07-19 ENCOUNTER — OFFICE VISIT (OUTPATIENT)
Dept: FAMILY MEDICINE CLINIC | Age: 78
End: 2023-07-19

## 2023-07-19 ENCOUNTER — HOSPITAL ENCOUNTER (OUTPATIENT)
Dept: CARDIAC REHAB | Age: 78
Setting detail: THERAPIES SERIES
Discharge: HOME | End: 2023-07-19
Payer: MEDICARE

## 2023-07-19 VITALS
BODY MASS INDEX: 26.96 KG/M2 | WEIGHT: 154.6 LBS | DIASTOLIC BLOOD PRESSURE: 76 MMHG | SYSTOLIC BLOOD PRESSURE: 123 MMHG | HEART RATE: 80 BPM | OXYGEN SATURATION: 93 % | TEMPERATURE: 98.2 F

## 2023-07-19 DIAGNOSIS — J18.9 PNEUMONIA OF LEFT UPPER LOBE DUE TO INFECTIOUS ORGANISM: ICD-10-CM

## 2023-07-19 DIAGNOSIS — R50.9 FEVER, UNSPECIFIED FEVER CAUSE: Primary | ICD-10-CM

## 2023-07-19 LAB
INFLUENZA A ANTIGEN, POC: NEGATIVE
INFLUENZA B ANTIGEN, POC: NEGATIVE
LOT EXPIRE DATE: NORMAL
LOT KIT NUMBER: NORMAL
SARS-COV-2, POC: NORMAL
VALID INTERNAL CONTROL: NORMAL
VENDOR AND KIT NAME POC: NORMAL

## 2023-07-19 RX ORDER — DOXYCYCLINE HYCLATE 100 MG
100 TABLET ORAL 2 TIMES DAILY
Qty: 20 TABLET | Refills: 0 | Status: SHIPPED | OUTPATIENT
Start: 2023-07-19 | End: 2023-07-29

## 2023-07-19 ASSESSMENT — ENCOUNTER SYMPTOMS
COUGH: 1
EYES NEGATIVE: 1
GASTROINTESTINAL NEGATIVE: 1

## 2023-07-19 NOTE — PATIENT INSTRUCTIONS
Please read the healthy family handout that you were given and share it with your family. Please compare this printed medication list with your medications at home to be sure they are the same. If you have any medications that are different please contact us immediately at [phone]. Also review your allergies that we have listed, these may also include medications that you have not been able to tolerate, make sure everything listed is correct. If you have any allergies that are different please contact us immediately at [phone]. You may receive a survey in the mail or by email asking about your experience during your visit today. Please complete and return to us so we know how we are serving you.

## 2023-07-20 LAB
ALBUMIN SERPL-MCNC: 4.2 G/DL (ref 3.4–5)
ALBUMIN/GLOB SERPL: 1.4 {RATIO} (ref 1.1–2.2)
ALP SERPL-CCNC: 72 U/L (ref 40–129)
ALT SERPL-CCNC: 8 U/L (ref 10–40)
ANION GAP SERPL CALCULATED.3IONS-SCNC: 16 MMOL/L (ref 3–16)
AST SERPL-CCNC: 11 U/L (ref 15–37)
BASOPHILS # BLD: 0 K/UL (ref 0–0.2)
BASOPHILS NFR BLD: 0 %
BILIRUB SERPL-MCNC: 0.4 MG/DL (ref 0–1)
BUN SERPL-MCNC: 13 MG/DL (ref 7–20)
CALCIUM SERPL-MCNC: 10 MG/DL (ref 8.3–10.6)
CHLORIDE SERPL-SCNC: 97 MMOL/L (ref 99–110)
CO2 SERPL-SCNC: 23 MMOL/L (ref 21–32)
CREAT SERPL-MCNC: 0.9 MG/DL (ref 0.6–1.2)
CRP SERPL-MCNC: 169.2 MG/L (ref 0–5.1)
DEPRECATED RDW RBC AUTO: 15.7 % (ref 12.4–15.4)
EOSINOPHIL # BLD: 0 K/UL (ref 0–0.6)
EOSINOPHIL NFR BLD: 0 %
ERYTHROCYTE [SEDIMENTATION RATE] IN BLOOD BY WESTERGREN METHOD: 77 MM/HR (ref 0–30)
GFR SERPLBLD CREATININE-BSD FMLA CKD-EPI: >60 ML/MIN/{1.73_M2}
GLUCOSE SERPL-MCNC: 68 MG/DL (ref 70–99)
HCT VFR BLD AUTO: 35.7 % (ref 36–48)
HGB BLD-MCNC: 12 G/DL (ref 12–16)
LYMPHOCYTES # BLD: 3 K/UL (ref 1–5.1)
LYMPHOCYTES NFR BLD: 24 %
MCH RBC QN AUTO: 30.1 PG (ref 26–34)
MCHC RBC AUTO-ENTMCNC: 33.7 G/DL (ref 31–36)
MCV RBC AUTO: 89.3 FL (ref 80–100)
MONOCYTES # BLD: 0.5 K/UL (ref 0–1.3)
MONOCYTES NFR BLD: 4 %
NEUTROPHILS # BLD: 9.1 K/UL (ref 1.7–7.7)
NEUTROPHILS NFR BLD: 72 %
PLATELET # BLD AUTO: 345 K/UL (ref 135–450)
PMV BLD AUTO: 9 FL (ref 5–10.5)
POTASSIUM SERPL-SCNC: 4.4 MMOL/L (ref 3.5–5.1)
PROT SERPL-MCNC: 7.2 G/DL (ref 6.4–8.2)
RBC # BLD AUTO: 4 M/UL (ref 4–5.2)
RBC MORPH BLD: NORMAL
SODIUM SERPL-SCNC: 136 MMOL/L (ref 136–145)
WBC # BLD AUTO: 12.6 K/UL (ref 4–11)

## 2023-07-21 ENCOUNTER — TELEPHONE (OUTPATIENT)
Dept: CARDIAC REHAB | Age: 78
End: 2023-07-21

## 2023-07-21 ENCOUNTER — HOSPITAL ENCOUNTER (OUTPATIENT)
Dept: CARDIAC REHAB | Age: 78
Setting detail: THERAPIES SERIES
Discharge: HOME | End: 2023-07-21
Payer: MEDICARE

## 2023-07-24 ENCOUNTER — HOSPITAL ENCOUNTER (OUTPATIENT)
Dept: CARDIAC REHAB | Age: 78
Setting detail: THERAPIES SERIES
Discharge: HOME | End: 2023-07-24
Payer: MEDICARE

## 2023-07-26 ENCOUNTER — OFFICE VISIT (OUTPATIENT)
Dept: FAMILY MEDICINE CLINIC | Age: 78
End: 2023-07-26

## 2023-07-26 ENCOUNTER — HOSPITAL ENCOUNTER (OUTPATIENT)
Dept: CARDIAC REHAB | Age: 78
Setting detail: THERAPIES SERIES
End: 2023-07-26
Payer: MEDICARE

## 2023-07-26 VITALS
TEMPERATURE: 98.3 F | WEIGHT: 153 LBS | HEART RATE: 81 BPM | SYSTOLIC BLOOD PRESSURE: 109 MMHG | BODY MASS INDEX: 26.68 KG/M2 | DIASTOLIC BLOOD PRESSURE: 65 MMHG | OXYGEN SATURATION: 95 %

## 2023-07-26 DIAGNOSIS — E78.00 HYPERCHOLESTEREMIA: ICD-10-CM

## 2023-07-26 DIAGNOSIS — I34.0 NONRHEUMATIC MITRAL VALVE REGURGITATION: ICD-10-CM

## 2023-07-26 DIAGNOSIS — E53.8 VITAMIN B12 DEFICIENCY: ICD-10-CM

## 2023-07-26 DIAGNOSIS — Z98.890 H/O MYOMECTOMY: ICD-10-CM

## 2023-07-26 DIAGNOSIS — I48.91 ATRIAL FIBRILLATION WITH RVR (HCC): ICD-10-CM

## 2023-07-26 DIAGNOSIS — I42.1 HOCM (HYPERTROPHIC OBSTRUCTIVE CARDIOMYOPATHY) (HCC): Primary | ICD-10-CM

## 2023-07-26 DIAGNOSIS — E11.42 TYPE 2 DIABETES MELLITUS WITH DIABETIC POLYNEUROPATHY, WITHOUT LONG-TERM CURRENT USE OF INSULIN (HCC): ICD-10-CM

## 2023-07-26 DIAGNOSIS — R09.1 PLEURISY: ICD-10-CM

## 2023-07-26 DIAGNOSIS — I10 ESSENTIAL HYPERTENSION: ICD-10-CM

## 2023-07-26 PROBLEM — I42.2 HYPERTROPHIC CARDIOMYOPATHY (HCC): Status: RESOLVED | Noted: 2022-12-04 | Resolved: 2023-07-26

## 2023-07-26 PROBLEM — I50.9 ACUTE CONGESTIVE HEART FAILURE (HCC): Status: RESOLVED | Noted: 2022-12-04 | Resolved: 2023-07-26

## 2023-07-26 LAB
ALT SERPL-CCNC: 9 U/L (ref 10–40)
ANION GAP SERPL CALCULATED.3IONS-SCNC: 14 MMOL/L (ref 3–16)
BUN SERPL-MCNC: 14 MG/DL (ref 7–20)
CALCIUM SERPL-MCNC: 10.1 MG/DL (ref 8.3–10.6)
CHLORIDE SERPL-SCNC: 102 MMOL/L (ref 99–110)
CHOLEST SERPL-MCNC: 118 MG/DL (ref 0–199)
CO2 SERPL-SCNC: 23 MMOL/L (ref 21–32)
CREAT SERPL-MCNC: 0.8 MG/DL (ref 0.6–1.2)
GFR SERPLBLD CREATININE-BSD FMLA CKD-EPI: >60 ML/MIN/{1.73_M2}
GLUCOSE SERPL-MCNC: 109 MG/DL (ref 70–99)
HDLC SERPL-MCNC: 32 MG/DL (ref 40–60)
LDLC SERPL CALC-MCNC: 62 MG/DL
POTASSIUM SERPL-SCNC: 4.9 MMOL/L (ref 3.5–5.1)
SODIUM SERPL-SCNC: 139 MMOL/L (ref 136–145)
T4 FREE SERPL-MCNC: 1 NG/DL (ref 0.9–1.8)
TRIGL SERPL-MCNC: 122 MG/DL (ref 0–150)
TSH SERPL DL<=0.005 MIU/L-ACNC: 4.45 UIU/ML (ref 0.27–4.2)
VIT B12 SERPL-MCNC: 903 PG/ML (ref 211–911)
VLDLC SERPL CALC-MCNC: 24 MG/DL

## 2023-07-26 NOTE — PATIENT INSTRUCTIONS
Please read the healthy family handout that you were given and share it with your family. Please compare this printed medication list with your medications at home to be sure they are the same. If you have any medications that are different please contact us immediately at [phone]. Also review your allergies that we have listed, these may also include medications that you have not been able to tolerate, make sure everything listed is correct. If you have any allergies that are different please contact us immediately at [phone]. You may receive a survey in the mail or by email asking about your experience during your visit today. Please complete and return to us so we know how we are serving you.

## 2023-07-27 LAB
EST. AVERAGE GLUCOSE BLD GHB EST-MCNC: 137 MG/DL
HBA1C MFR BLD: 6.4 %

## 2023-07-28 ENCOUNTER — HOSPITAL ENCOUNTER (OUTPATIENT)
Dept: CARDIAC REHAB | Age: 78
Setting detail: THERAPIES SERIES
Discharge: HOME | End: 2023-07-28
Payer: MEDICARE

## 2023-07-28 PROCEDURE — 93798 PHYS/QHP OP CAR RHAB W/ECG: CPT

## 2023-07-31 ENCOUNTER — HOSPITAL ENCOUNTER (OUTPATIENT)
Dept: CARDIAC REHAB | Age: 78
Setting detail: THERAPIES SERIES
Discharge: HOME | End: 2023-07-31
Payer: MEDICARE

## 2023-07-31 PROCEDURE — 93798 PHYS/QHP OP CAR RHAB W/ECG: CPT

## 2023-07-31 ASSESSMENT — EJECTION FRACTION: EF_VALUE: 50

## 2023-07-31 ASSESSMENT — LIFESTYLE VARIABLES
CIGARETTES_PER_DAY: 20
SMOKELESS_TOBACCO: NO

## 2023-07-31 NOTE — PLAN OF CARE
Cardiopulmonary Rehab Treatment Plan   Name: Isatu Almazan Assessment Date: 2023   : 1945 Primary Diagnosis: Valve    Age: 69 y.o. Referring Physician:  Prudencio Merida   MRN: [id number] Primary Care Physician: Tori Jorgensen MD   Treatment Diagnosis  Treatment Diagnosis 1: Valve  Valve Date: 23  Referral Date: 23  Significant Cardiovascular History: Chronic atrial fibrillation; History of heart failure  Co-morbidities: Diabetes    Individual Treatment Plan  ITP Visit Type: Re-assessment  1st Date of Exercise : 23  ITP Next Review Date: 23 [facility]  Visit #/Total Visits:   EF%: 50 %  Risk Stratification: Moderate    Exercise   Stages of Change: Preparation  Assisted Devices: None  Harmon Total Score: 0    Data Measured Before Walk  Heart Rate: 73  Blood Pressure: 118/64  O2 Saturation: 97  O2 Device: Room air    Data Measured during Walk  Any problems while exercising: hip discomfort  Describe Type of Pain You Are Having: Hips ache with activity  Peak RPE: 12  Peak RPD: 3    Data Measured Immediately After Walk  Distance Walked in : ft  Distance Walked (ft): 1150 ft  Peak Heart Rate: 87  Peak Blood Pressure: 136/74  Modified Carmella Scale: 3  O2 Saturation: 94    Data Measured at 5 Minutes After Walk  Heart Rate: 72  Blood Pressure: 120/78  SpO2: 99 %  O2 Device: Room air    Exercise Prescription  Mode: Treadmill; Bike; Stepper; Ergometer;  Rower; Elliptical  Frequency per week: 3-5  Duration Per Session: 25-32+ minutes  RPE: 11-14  Resistance Training: Yes    Exercise Activity at Home  Type: Walking  Frequency: 2-3 days weekly  Duration: 30+ minutes  Resistance Training: Yes    Exercise Education  Education: Exercise safety; Signs/symptoms to report; RPE scale    Psychosocial  Stages of Change: Preparation  [facility] Total Score: 21  PHQ-9 Total Score: 0    Psychosocial Intervention  Interventions: No intervention indicated  Currently Taking Psychotropic Meds: No  Medication

## 2023-08-02 ENCOUNTER — HOSPITAL ENCOUNTER (OUTPATIENT)
Dept: CARDIAC REHAB | Age: 78
Setting detail: THERAPIES SERIES
Discharge: HOME | End: 2023-08-02
Payer: MEDICARE

## 2023-08-02 PROCEDURE — 93798 PHYS/QHP OP CAR RHAB W/ECG: CPT

## 2023-08-03 ENCOUNTER — OFFICE VISIT (OUTPATIENT)
Dept: FAMILY MEDICINE CLINIC | Age: 78
End: 2023-08-03

## 2023-08-03 VITALS
DIASTOLIC BLOOD PRESSURE: 68 MMHG | OXYGEN SATURATION: 95 % | BODY MASS INDEX: 26.85 KG/M2 | WEIGHT: 154 LBS | TEMPERATURE: 98 F | HEART RATE: 76 BPM | SYSTOLIC BLOOD PRESSURE: 107 MMHG

## 2023-08-03 DIAGNOSIS — M25.511 RIGHT SHOULDER PAIN, UNSPECIFIED CHRONICITY: ICD-10-CM

## 2023-08-03 DIAGNOSIS — R91.8 LUNG MASS: ICD-10-CM

## 2023-08-03 DIAGNOSIS — R50.9 FEVER, UNSPECIFIED FEVER CAUSE: Primary | ICD-10-CM

## 2023-08-03 LAB
BASOPHILS # BLD: 0 K/UL (ref 0–0.2)
BASOPHILS NFR BLD: 0.3 %
DEPRECATED RDW RBC AUTO: 16.6 % (ref 12.4–15.4)
EOSINOPHIL # BLD: 0 K/UL (ref 0–0.6)
EOSINOPHIL NFR BLD: 0 %
HCT VFR BLD AUTO: 33.7 % (ref 36–48)
HGB BLD-MCNC: 11.3 G/DL (ref 12–16)
LYMPHOCYTES # BLD: 1.5 K/UL (ref 1–5.1)
LYMPHOCYTES NFR BLD: 11.4 %
MCH RBC QN AUTO: 30.2 PG (ref 26–34)
MCHC RBC AUTO-ENTMCNC: 33.6 G/DL (ref 31–36)
MCV RBC AUTO: 89.7 FL (ref 80–100)
MONOCYTES # BLD: 1.4 K/UL (ref 0–1.3)
MONOCYTES NFR BLD: 10.3 %
NEUTROPHILS # BLD: 10.5 K/UL (ref 1.7–7.7)
NEUTROPHILS NFR BLD: 78 %
PLATELET # BLD AUTO: 370 K/UL (ref 135–450)
PMV BLD AUTO: 8.7 FL (ref 5–10.5)
RBC # BLD AUTO: 3.76 M/UL (ref 4–5.2)
WBC # BLD AUTO: 13.4 K/UL (ref 4–11)

## 2023-08-03 RX ORDER — AZITHROMYCIN 250 MG/1
250 TABLET, FILM COATED ORAL SEE ADMIN INSTRUCTIONS
Qty: 6 TABLET | Refills: 0 | Status: SHIPPED | OUTPATIENT
Start: 2023-08-03 | End: 2023-08-08

## 2023-08-03 ASSESSMENT — ENCOUNTER SYMPTOMS
SHORTNESS OF BREATH: 0
GASTROINTESTINAL NEGATIVE: 1
CHEST TIGHTNESS: 1
RHINORRHEA: 1

## 2023-08-03 NOTE — PROGRESS NOTES
CHIEF COMPLAINT  Chief Complaint   Patient presents with    Shoulder Pain     Started hurting with the vest she had to wear from open heart surgery--has gotten worse. LAURI Westbrook is a 69 y.o. female who presents to the office complaining of right shoulder pain and low-grade fever. Patient has had symptoms intermittently since surgery approximately 3 months ago. Patient has been seen by cardiologist, surgeon, myself and other providers. Concern for pneumonia and pleurisy with previous visits therefore patient placed on antibiotics. Patient reports that symptoms did somewhat improve but returned. Patient reports low-grade fever x2 days. Patient reports pain in shoulder is worse with movement or deep breath. He, trauma or fall. Patient reports that she did notice an increase in shoulder pain after cardiac rehab. Patient denies any dyspnea upon exertion or rest.  No current complaints of chest pain. Patient reports mild clear rhinorrhea but no cough or sore throat. No other complaints, modifying factors or associated symptoms. Nursing notes reviewed.    Past Medical History:   Diagnosis Date    CTS (carpal tunnel syndrome)     DM (diabetes mellitus) ([address])     foot 4/07, micral 4/07    Elevated LFT's     HTN     Hyperlipidemia     neg gxt 11/00    Lung mass     stable    Peripheral neuropathy     Protein in urine      Past Surgical History:   Procedure Laterality Date    HYSTERECTOMY (CERVIX STATUS UNKNOWN)      LUMBAR DISC SURGERY      TRE AND BSO (CERVIX REMOVED)       Family History   Problem Relation Age of Onset    Unknown Mother     Cancer Father      Social History     Socioeconomic History    Marital status:      Spouse name: Not on file    Number of children: Not on file    Years of education: Not on file    Highest education level: Not on file   Occupational History     Employer: RETIRED   Tobacco Use    Smoking status: Former     Packs/day: 1.00     Years: 33.00

## 2023-08-04 ENCOUNTER — HOSPITAL ENCOUNTER (OUTPATIENT)
Dept: CARDIAC REHAB | Age: 78
Setting detail: THERAPIES SERIES
Discharge: HOME | End: 2023-08-04
Payer: MEDICARE

## 2023-08-04 ENCOUNTER — TELEPHONE (OUTPATIENT)
Dept: CARDIAC REHAB | Age: 78
End: 2023-08-04

## 2023-08-04 NOTE — TELEPHONE ENCOUNTER
Patient is running a fever and will not be at cardiac rehab. Patient has seen PCP. CT scan ordered. Patient is going to schedule that.

## 2023-08-07 ENCOUNTER — HOSPITAL ENCOUNTER (OUTPATIENT)
Dept: CT IMAGING | Age: 78
Discharge: HOME | End: 2023-08-07
Payer: MEDICARE

## 2023-08-07 ENCOUNTER — HOSPITAL ENCOUNTER (OUTPATIENT)
Dept: CARDIAC REHAB | Age: 78
Setting detail: THERAPIES SERIES
Discharge: HOME | End: 2023-08-07
Payer: MEDICARE

## 2023-08-07 DIAGNOSIS — R91.8 LUNG MASS: ICD-10-CM

## 2023-08-07 PROCEDURE — 93798 PHYS/QHP OP CAR RHAB W/ECG: CPT

## 2023-08-07 PROCEDURE — 71250 CT THORAX DX C-: CPT

## 2023-08-09 ENCOUNTER — HOSPITAL ENCOUNTER (OUTPATIENT)
Dept: CARDIAC REHAB | Age: 78
Setting detail: THERAPIES SERIES
Discharge: HOME | End: 2023-08-09
Payer: MEDICARE

## 2023-08-09 ENCOUNTER — TELEPHONE (OUTPATIENT)
Dept: FAMILY MEDICINE CLINIC | Age: 78
End: 2023-08-09

## 2023-08-09 DIAGNOSIS — R06.09 DYSPNEA ON EXERTION: Primary | ICD-10-CM

## 2023-08-09 PROCEDURE — 93798 PHYS/QHP OP CAR RHAB W/ECG: CPT

## 2023-08-09 RX ORDER — AMOXICILLIN AND CLAVULANATE POTASSIUM 875; 125 MG/1; MG/1
1 TABLET, FILM COATED ORAL 2 TIMES DAILY
Qty: 20 TABLET | Refills: 0 | Status: SHIPPED | OUTPATIENT
Start: 2023-08-09 | End: 2023-08-19

## 2023-08-11 ENCOUNTER — HOSPITAL ENCOUNTER (OUTPATIENT)
Dept: CARDIAC REHAB | Age: 78
Setting detail: THERAPIES SERIES
Discharge: HOME | End: 2023-08-11
Payer: MEDICARE

## 2023-08-11 PROCEDURE — 93798 PHYS/QHP OP CAR RHAB W/ECG: CPT

## 2023-08-14 ENCOUNTER — HOSPITAL ENCOUNTER (OUTPATIENT)
Dept: CARDIAC REHAB | Age: 78
Setting detail: THERAPIES SERIES
Discharge: HOME | End: 2023-08-14
Payer: MEDICARE

## 2023-08-14 PROCEDURE — 93798 PHYS/QHP OP CAR RHAB W/ECG: CPT

## 2023-08-16 ENCOUNTER — APPOINTMENT (OUTPATIENT)
Dept: CARDIAC REHAB | Age: 78
End: 2023-08-16
Payer: MEDICARE

## 2023-08-18 ENCOUNTER — HOSPITAL ENCOUNTER (OUTPATIENT)
Dept: CARDIAC REHAB | Age: 78
Setting detail: THERAPIES SERIES
Discharge: HOME | End: 2023-08-18
Payer: MEDICARE

## 2023-08-18 PROCEDURE — 93798 PHYS/QHP OP CAR RHAB W/ECG: CPT

## 2023-08-21 ENCOUNTER — HOSPITAL ENCOUNTER (OUTPATIENT)
Dept: CARDIAC REHAB | Age: 78
Setting detail: THERAPIES SERIES
Discharge: HOME | End: 2023-08-21
Payer: MEDICARE

## 2023-08-21 RX ORDER — APIXABAN 5 MG/1
TABLET, FILM COATED ORAL
Qty: 180 TABLET | Refills: 3 | Status: SHIPPED | OUTPATIENT
Start: 2023-08-21

## 2023-08-23 ENCOUNTER — HOSPITAL ENCOUNTER (OUTPATIENT)
Dept: CARDIAC REHAB | Age: 78
Setting detail: THERAPIES SERIES
Discharge: HOME | End: 2023-08-23
Payer: MEDICARE

## 2023-08-23 PROCEDURE — 93798 PHYS/QHP OP CAR RHAB W/ECG: CPT

## 2023-08-25 ENCOUNTER — HOSPITAL ENCOUNTER (OUTPATIENT)
Dept: CARDIAC REHAB | Age: 78
Setting detail: THERAPIES SERIES
Discharge: HOME | End: 2023-08-25
Payer: MEDICARE

## 2023-08-25 ENCOUNTER — HOSPITAL ENCOUNTER (OUTPATIENT)
Dept: GENERAL RADIOLOGY | Age: 78
Discharge: HOME | End: 2023-08-25
Payer: MEDICARE

## 2023-08-25 ENCOUNTER — HOSPITAL ENCOUNTER (OUTPATIENT)
Age: 78
Discharge: HOME | End: 2023-08-25
Payer: MEDICARE

## 2023-08-25 DIAGNOSIS — R06.09 DYSPNEA ON EXERTION: ICD-10-CM

## 2023-08-25 PROCEDURE — 71046 X-RAY EXAM CHEST 2 VIEWS: CPT

## 2023-08-25 PROCEDURE — 93798 PHYS/QHP OP CAR RHAB W/ECG: CPT

## 2023-08-25 NOTE — PLAN OF CARE
Cardiopulmonary Rehab Treatment Plan   Name: Hayden Gomez Assessment Date: 2023   : 1945 Primary Diagnosis: Valve    Age: 69 y.o. Referring Physician:  Ean Herbert   MRN: [id number] Primary Care Physician: Elver Hoyt MD   Treatment Diagnosis  Treatment Diagnosis 1: Valve  Valve Date: 23  Referral Date: 23  Significant Cardiovascular History: Chronic atrial fibrillation; History of heart failure  Co-morbidities: Diabetes    Individual Treatment Plan  ITP Visit Type: Re-assessment (60 day)  1st Date of Exercise : 23  ITP Next Review Date: 23  Visit #/Total Visits: 16/36  EF%: 50 %  Risk Stratification: Moderate    Exercise   Stages of Change: Preparation  Assisted Devices: None  Harmon Total Score: 0    Data Measured Before Walk  Heart Rate: 73  Blood Pressure: 118/64  O2 Saturation: 97  O2 Device: Room air    Data Measured during Walk  Any problems while exercising: hip discomfort  Describe Type of Pain You Are Having: Hips ache with activity  Peak RPE: 12  Peak RPD: 3    Data Measured Immediately After Walk  Distance Walked in : ft  Distance Walked (ft): 1150 ft  Peak Heart Rate: 87  Peak Blood Pressure: 136/74  Modified Carmella Scale: 3  O2 Saturation: 94    Data Measured at 5 Minutes After Walk  Heart Rate: 72  Blood Pressure: 120/78  SpO2: 99 %  O2 Device: Room air    Exercise Prescription  Mode: Treadmill; Bike; Stepper; Ergometer; Rower; Elliptical  Frequency per week: 3  Duration Per Session: 32-40+  Intensity METS      : 3-6  RPE: 11-14  Progression: tolerates low to moderate levels of exercise in target duration, MET, and RPE zone.   Resistance Training: Yes (1 lb)    Exercise Blood Pressures  Resting BP: 128/72    Exercise Activity at Home  Type: Walking  Frequency: 2-3 days weekly  Duration: 30+ minutes  Resistance Training: Yes    Exercise Education  Education: Physically active ([employer])    Psychosocial  Stages of Change: Preparation  [facility]

## 2023-08-28 ENCOUNTER — HOSPITAL ENCOUNTER (OUTPATIENT)
Dept: CARDIAC REHAB | Age: 78
Setting detail: THERAPIES SERIES
Discharge: HOME | End: 2023-08-28
Payer: MEDICARE

## 2023-08-28 PROCEDURE — 93798 PHYS/QHP OP CAR RHAB W/ECG: CPT

## 2023-08-30 ENCOUNTER — HOSPITAL ENCOUNTER (OUTPATIENT)
Dept: CARDIAC REHAB | Age: 78
Setting detail: THERAPIES SERIES
Discharge: HOME | End: 2023-08-30
Payer: MEDICARE

## 2023-08-30 PROCEDURE — 93798 PHYS/QHP OP CAR RHAB W/ECG: CPT

## 2023-09-01 ENCOUNTER — HOSPITAL ENCOUNTER (OUTPATIENT)
Dept: CARDIAC REHAB | Age: 78
Setting detail: THERAPIES SERIES
Discharge: HOME | End: 2023-09-01
Payer: MEDICARE

## 2023-09-01 PROCEDURE — 93798 PHYS/QHP OP CAR RHAB W/ECG: CPT

## 2023-09-04 ENCOUNTER — APPOINTMENT (OUTPATIENT)
Dept: CARDIAC REHAB | Age: 78
End: 2023-09-04
Payer: MEDICARE

## 2023-09-06 ENCOUNTER — HOSPITAL ENCOUNTER (OUTPATIENT)
Dept: CARDIAC REHAB | Age: 78
Setting detail: THERAPIES SERIES
Discharge: HOME | End: 2023-09-06
Payer: MEDICARE

## 2023-09-06 PROCEDURE — 93798 PHYS/QHP OP CAR RHAB W/ECG: CPT

## 2023-09-08 ENCOUNTER — TELEPHONE (OUTPATIENT)
Dept: CARDIAC REHAB | Age: 78
End: 2023-09-08

## 2023-09-08 ENCOUNTER — HOSPITAL ENCOUNTER (OUTPATIENT)
Dept: CARDIAC REHAB | Age: 78
Setting detail: THERAPIES SERIES
Discharge: HOME | End: 2023-09-08
Payer: MEDICARE

## 2023-09-08 NOTE — TELEPHONE ENCOUNTER
Voicemail received from patient that her  Hasmukh Tolliver has a colonoscopy today and she will not be at cardiac rehab today.

## 2023-09-11 ENCOUNTER — HOSPITAL ENCOUNTER (OUTPATIENT)
Dept: CARDIAC REHAB | Age: 78
Setting detail: THERAPIES SERIES
Discharge: HOME | End: 2023-09-11
Payer: MEDICARE

## 2023-09-11 PROCEDURE — 93798 PHYS/QHP OP CAR RHAB W/ECG: CPT

## 2023-09-13 ENCOUNTER — HOSPITAL ENCOUNTER (OUTPATIENT)
Dept: CARDIAC REHAB | Age: 78
Setting detail: THERAPIES SERIES
Discharge: HOME | End: 2023-09-13
Payer: MEDICARE

## 2023-09-13 PROCEDURE — 93798 PHYS/QHP OP CAR RHAB W/ECG: CPT

## 2023-09-15 ENCOUNTER — HOSPITAL ENCOUNTER (OUTPATIENT)
Dept: CARDIAC REHAB | Age: 78
Setting detail: THERAPIES SERIES
Discharge: HOME | End: 2023-09-15
Payer: MEDICARE

## 2023-09-15 PROCEDURE — 93798 PHYS/QHP OP CAR RHAB W/ECG: CPT

## 2023-09-18 ENCOUNTER — HOSPITAL ENCOUNTER (OUTPATIENT)
Dept: CARDIAC REHAB | Age: 78
Setting detail: THERAPIES SERIES
Discharge: HOME | End: 2023-09-18
Payer: MEDICARE

## 2023-09-18 PROCEDURE — 93798 PHYS/QHP OP CAR RHAB W/ECG: CPT

## 2023-09-20 ENCOUNTER — HOSPITAL ENCOUNTER (OUTPATIENT)
Dept: CARDIAC REHAB | Age: 78
Setting detail: THERAPIES SERIES
Discharge: HOME | End: 2023-09-20
Payer: MEDICARE

## 2023-09-20 PROCEDURE — 93798 PHYS/QHP OP CAR RHAB W/ECG: CPT

## 2023-09-20 NOTE — PLAN OF CARE
Goals  Target Goals: Medication compliance; Risk factors    Patient Education   Education: Signs/Symptoms of angina; Risk factors  Hypertension: Yes  Hypertension Controlled: Yes  Is BP WDL: Yes  Med(s) Change: No  BP Meds: atenoloL (TENORMIN) 50 mg,    ASA Prescribed: Yes  ASA Adherent: Yes  Antiplatelet Prescribed: Yes  Antiplatelet Adherent: Yes  BB Prescribed: Yes  BB Adherent: Yes  Statins Prescribed: Yes  Statin Intensity: High  Statins Adherent: Yes  Education Target Goals  Target Goals: Medication compliance; Risk factors    Staff Treatment Goals  Goals: Exercise; Functional capacity; Nutrition; Weight management  Exercise Goal: Increase strength and endurance  Exercise Goal Status: Progressing (MET level 1-3.1.)  Exercise Goal Comments: attends cardiac rehab regularly and increasing MET level. Nutrition Goal: Heart Healthy Diet  Weight Managment Goal Status: Not Met (gained 6 lbs this month)              Provider Review and Approval of this ITP    The above treatment plan and goals have been set for your patient during Cardiac Rehabilitation.  Please review and Electronically Cosign        Electronically signed by Josh Mariano RN on 9/20/23 at 9:42 AM EDT

## 2023-09-22 ENCOUNTER — HOSPITAL ENCOUNTER (OUTPATIENT)
Dept: CARDIAC REHAB | Age: 78
Setting detail: THERAPIES SERIES
Discharge: HOME | End: 2023-09-22
Payer: MEDICARE

## 2023-09-22 PROCEDURE — 93798 PHYS/QHP OP CAR RHAB W/ECG: CPT

## 2023-09-25 ENCOUNTER — HOSPITAL ENCOUNTER (OUTPATIENT)
Dept: CARDIAC REHAB | Age: 78
Setting detail: THERAPIES SERIES
Discharge: HOME | End: 2023-09-25
Payer: MEDICARE

## 2023-09-25 PROCEDURE — 93798 PHYS/QHP OP CAR RHAB W/ECG: CPT

## 2023-09-27 ENCOUNTER — HOSPITAL ENCOUNTER (OUTPATIENT)
Dept: CARDIAC REHAB | Age: 78
Setting detail: THERAPIES SERIES
Discharge: HOME | End: 2023-09-27
Payer: MEDICARE

## 2023-09-27 PROCEDURE — 93798 PHYS/QHP OP CAR RHAB W/ECG: CPT

## 2023-09-29 ENCOUNTER — HOSPITAL ENCOUNTER (OUTPATIENT)
Dept: CARDIAC REHAB | Age: 78
Setting detail: THERAPIES SERIES
Discharge: HOME | End: 2023-09-29
Payer: MEDICARE

## 2023-09-29 PROCEDURE — 93798 PHYS/QHP OP CAR RHAB W/ECG: CPT

## 2023-10-02 ENCOUNTER — HOSPITAL ENCOUNTER (OUTPATIENT)
Dept: CARDIAC REHAB | Age: 78
Setting detail: THERAPIES SERIES
Discharge: HOME | End: 2023-10-02
Payer: MEDICARE

## 2023-10-02 PROCEDURE — 93798 PHYS/QHP OP CAR RHAB W/ECG: CPT

## 2023-10-04 ENCOUNTER — HOSPITAL ENCOUNTER (OUTPATIENT)
Dept: CARDIAC REHAB | Age: 78
Setting detail: THERAPIES SERIES
Discharge: HOME | End: 2023-10-04
Payer: MEDICARE

## 2023-10-04 PROCEDURE — 93798 PHYS/QHP OP CAR RHAB W/ECG: CPT

## 2023-10-06 ENCOUNTER — HOSPITAL ENCOUNTER (OUTPATIENT)
Dept: CARDIAC REHAB | Age: 78
Setting detail: THERAPIES SERIES
Discharge: HOME | End: 2023-10-06
Payer: MEDICARE

## 2023-10-06 PROCEDURE — 93798 PHYS/QHP OP CAR RHAB W/ECG: CPT

## 2023-10-09 ENCOUNTER — OFFICE VISIT (OUTPATIENT)
Dept: FAMILY MEDICINE CLINIC | Age: 78
End: 2023-10-09

## 2023-10-09 ENCOUNTER — HOSPITAL ENCOUNTER (OUTPATIENT)
Dept: CARDIAC REHAB | Age: 78
Setting detail: THERAPIES SERIES
Discharge: HOME | End: 2023-10-09
Payer: MEDICARE

## 2023-10-09 VITALS
HEART RATE: 64 BPM | DIASTOLIC BLOOD PRESSURE: 58 MMHG | TEMPERATURE: 98.1 F | HEIGHT: 64 IN | WEIGHT: 154.13 LBS | BODY MASS INDEX: 26.31 KG/M2 | SYSTOLIC BLOOD PRESSURE: 91 MMHG | OXYGEN SATURATION: 97 %

## 2023-10-09 DIAGNOSIS — J01.10 ACUTE NON-RECURRENT FRONTAL SINUSITIS: ICD-10-CM

## 2023-10-09 DIAGNOSIS — R05.1 ACUTE COUGH: Primary | ICD-10-CM

## 2023-10-09 LAB
INFLUENZA A ANTIGEN, POC: NEGATIVE
INFLUENZA B ANTIGEN, POC: NEGATIVE
LOT EXPIRE DATE: NORMAL
LOT KIT NUMBER: NORMAL
S PYO AG THROAT QL: NORMAL
SARS-COV-2, POC: NORMAL
VALID INTERNAL CONTROL: NORMAL
VENDOR AND KIT NAME POC: NORMAL

## 2023-10-09 RX ORDER — AZITHROMYCIN 250 MG/1
250 TABLET, FILM COATED ORAL SEE ADMIN INSTRUCTIONS
Qty: 6 TABLET | Refills: 0 | Status: SHIPPED | OUTPATIENT
Start: 2023-10-09 | End: 2023-10-14

## 2023-10-09 RX ORDER — BENZONATATE 100 MG/1
100 CAPSULE ORAL 3 TIMES DAILY PRN
Qty: 30 CAPSULE | Refills: 0 | Status: SHIPPED | OUTPATIENT
Start: 2023-10-09 | End: 2023-10-19

## 2023-10-09 ASSESSMENT — ENCOUNTER SYMPTOMS
EYE DISCHARGE: 1
SINUS PRESSURE: 1
COUGH: 1
SHORTNESS OF BREATH: 0
SORE THROAT: 1

## 2023-10-09 NOTE — CARDIO/PULMONARY
Pt called and cancelled appt. Due to other doctors appt. Will return Wednesday 10/11/2023.     Electronically signed by Mandi Trevino on 10/9/2023 at 10:45 AM

## 2023-10-11 ENCOUNTER — HOSPITAL ENCOUNTER (OUTPATIENT)
Dept: CARDIAC REHAB | Age: 78
Setting detail: THERAPIES SERIES
End: 2023-10-11
Payer: MEDICARE

## 2023-10-13 ENCOUNTER — TELEPHONE (OUTPATIENT)
Dept: FAMILY MEDICINE CLINIC | Age: 78
End: 2023-10-13

## 2023-10-13 ENCOUNTER — HOSPITAL ENCOUNTER (OUTPATIENT)
Dept: CARDIAC REHAB | Age: 78
Setting detail: THERAPIES SERIES
Discharge: HOME | End: 2023-10-13
Payer: MEDICARE

## 2023-10-13 PROCEDURE — 93798 PHYS/QHP OP CAR RHAB W/ECG: CPT

## 2023-10-13 NOTE — TELEPHONE ENCOUNTER
Symptoms are most likely viral in nature continue recommendations of drinking plenty of fluids, over-the-counter decongestants and immune support vitamins.

## 2023-10-13 NOTE — PLAN OF CARE
Cardiopulmonary Rehab Treatment Plan   Name: Kyleigh Alcantar Assessment Date: 10/13/2023   : 1945 Primary Diagnosis: Treatment Diagnosis 1: Valve      Age: 69 y.o. Referring Physician:  Micki Auguste   MRN: [id number] Primary Care Physician: Fredy Yan MD   Treatment Diagnosis  Treatment Diagnosis 1: Valve  Valve Date: 23  Referral Date: 23  Significant Cardiovascular History: Chronic atrial fibrillation; History of heart failure  Co-morbidities: Diabetes    Individual Treatment Plan  ITP Visit Type: Re-assessment (120 day)  1st Date of Exercise : 23  ITP Next Review Date: 10/14/23  Visit #/Total Visits: 32/36  EF%: 50 %  Risk Stratification: Moderate  ITP: Exercise; Psychosocial; Nutrition; Education    Exercise   Stages of Change: Action  Assisted Devices: None  Harmon Total Score: 0    Data Measured Before Walk  Heart Rate: 73  Blood Pressure: 118/64  O2 Saturation: 97  O2 Device: Room air    Data Measured during Walk  Any problems while exercising: hip discomfort  Describe Type of Pain You Are Having: Hips ache with activity  Peak RPE: 12  Peak RPD: 3    Data Measured Immediately After Walk  Distance Walked in : ft  Distance Walked (ft): 1150 ft  Peak Heart Rate: 87  Peak Blood Pressure: 136/74  Modified Carmella Scale: 3  O2 Saturation: 94    Data Measured at 5 Minutes After Walk  Heart Rate: 72  Blood Pressure: 120/78  SpO2: 99 %  O2 Device: Room air    Exercise Prescription  Mode: Track; Treadmill; Bike; Stepper; Ergometer; Elliptical; Rower  Frequency per week: 2-3 supervised sessions weekly  Duration Per Session: 30-45+ minutes of steady aerobic exercise  Intensity METS      : 3-6 (Increase workloads 0.5-1.0 METS/weekly)  RPE: 12-14  Progression: patient tolerates moderate levels of exercise well in target frequency, duration, and intensity. Max MET level: 3.6.   Resistance Training: Yes    Exercise Blood Pressures  Resting BP: 118/56  Is BP WDL: Yes    Exercise Activity at

## 2023-10-13 NOTE — TELEPHONE ENCOUNTER
The patient called and he and his wife are no better. They were seen on 10/09/2023 by you and given zpak and tessalon capsules. They still have a cough and congestion but no fever or sore throat.  Please advise rc   [facility]

## 2023-10-16 ENCOUNTER — HOSPITAL ENCOUNTER (OUTPATIENT)
Dept: CARDIAC REHAB | Age: 78
Setting detail: THERAPIES SERIES
Discharge: HOME | End: 2023-10-16
Payer: MEDICARE

## 2023-10-16 PROCEDURE — 93798 PHYS/QHP OP CAR RHAB W/ECG: CPT

## 2023-10-18 ENCOUNTER — HOSPITAL ENCOUNTER (OUTPATIENT)
Dept: CARDIAC REHAB | Age: 78
Setting detail: THERAPIES SERIES
Discharge: HOME | End: 2023-10-18
Payer: MEDICARE

## 2023-10-18 PROCEDURE — 93798 PHYS/QHP OP CAR RHAB W/ECG: CPT

## 2023-10-20 ENCOUNTER — HOSPITAL ENCOUNTER (OUTPATIENT)
Dept: CARDIAC REHAB | Age: 78
Setting detail: THERAPIES SERIES
Discharge: HOME | End: 2023-10-20
Payer: MEDICARE

## 2023-10-20 VITALS — HEIGHT: 64 IN | WEIGHT: 153 LBS | BODY MASS INDEX: 26.12 KG/M2

## 2023-10-20 PROCEDURE — 93798 PHYS/QHP OP CAR RHAB W/ECG: CPT

## 2023-10-20 ASSESSMENT — PATIENT HEALTH QUESTIONNAIRE - PHQ9
7. TROUBLE CONCENTRATING ON THINGS, SUCH AS READING THE NEWSPAPER OR WATCHING TELEVISION: 0
SUM OF ALL RESPONSES TO PHQ QUESTIONS 1-9: 3
5. POOR APPETITE OR OVEREATING: 0
SUM OF ALL RESPONSES TO PHQ QUESTIONS 1-9: 3
8. MOVING OR SPEAKING SO SLOWLY THAT OTHER PEOPLE COULD HAVE NOTICED. OR THE OPPOSITE, BEING SO FIGETY OR RESTLESS THAT YOU HAVE BEEN MOVING AROUND A LOT MORE THAN USUAL: 0
SUM OF ALL RESPONSES TO PHQ9 QUESTIONS 1 & 2: 0
3. TROUBLE FALLING OR STAYING ASLEEP: 3
SUM OF ALL RESPONSES TO PHQ QUESTIONS 1-9: 3
6. FEELING BAD ABOUT YOURSELF - OR THAT YOU ARE A FAILURE OR HAVE LET YOURSELF OR YOUR FAMILY DOWN: 0
SUM OF ALL RESPONSES TO PHQ QUESTIONS 1-9: 3
1. LITTLE INTEREST OR PLEASURE IN DOING THINGS: 0
10. IF YOU CHECKED OFF ANY PROBLEMS, HOW DIFFICULT HAVE THESE PROBLEMS MADE IT FOR YOU TO DO YOUR WORK, TAKE CARE OF THINGS AT HOME, OR GET ALONG WITH OTHER PEOPLE: 0
4. FEELING TIRED OR HAVING LITTLE ENERGY: 0
2. FEELING DOWN, DEPRESSED OR HOPELESS: 0
9. THOUGHTS THAT YOU WOULD BE BETTER OFF DEAD, OR OF HURTING YOURSELF: 0

## 2023-10-20 ASSESSMENT — EXERCISE STRESS TEST
PEAK_BP: 150/70
PEAK_BP: 150/70
PEAK_HR: 79

## 2023-10-20 NOTE — PLAN OF CARE
Cardiopulmonary Rehab Treatment Plan   Name: Clarence Kirkland Assessment Date: 10/20/2023   : 1945 Primary Diagnosis: Treatment Diagnosis 1: Valve      Age: 69 y.o. Referring Physician:  Lito Nelson   MRN: [id number] Primary Care Physician: Laura Santacruz MD   Treatment Diagnosis  Treatment Diagnosis 1: Valve  Valve Date: 23  Referral Date: 23  Significant Cardiovascular History: Chronic atrial fibrillation; History of heart failure  Co-morbidities: Diabetes    Individual Treatment Plan  ITP Visit Type: Discharge, completed program  1st Date of Exercise : 23  ITP Next Review Date: 23  Visit #/Total Visits: 36/36  EF%: 50 %  Risk Stratification: Moderate  ITP: Exercise; Psychosocial; Nutrition; Education    Exercise   Stages of Change: Action  Assisted Devices: None  Harmon Total Score: 0  Test: Six minute walk test    Data Measured Before Walk  Heart Rate: 59  Blood Pressure: 120/68  O2 Saturation: 98  O2 Device: Room air    Data Measured during Walk  Any problems while exercising: hip discomfort  Describe Type of Pain You Are Having: Hips ache with activity  Peak RPE: 12  Peak RPD: 3    Data Measured Immediately After Walk  Distance Walked in : ft  Distance Walked (ft): 1610 ft  Peak Heart Rate: 79  Peak Blood Pressure: 150/70  Modified Carmella Scale: 2  RPE: 12/20  O2 Saturation: 93  O2 Flow Rate (L/min): 0 l/min    Data Measured at 5 Minutes After Walk  Heart Rate: 64  Blood Pressure: 126/72  SpO2: 99 %  O2 Device: Room air    Exercise Prescription  Mode: Track; Treadmill; Bike; Stepper; Ergometer; Elliptical; Rower  Frequency per week: 2-3 supervised sessions weekly  Duration Per Session: 30-45+ minutes of steady aerobic exercise  Intensity METS      : 3-6 (Increase workloads 0.5-1.0 METS/weekly)  RPE: 12-14  Progression: patient tolerates moderate levels of exercise well in target frequency, duration, and intensity. Max MET level: 3.6.   Resistance Training: Yes    Exercise

## 2023-10-23 ENCOUNTER — APPOINTMENT (OUTPATIENT)
Dept: CARDIAC REHAB | Age: 78
End: 2023-10-23
Payer: MEDICARE

## 2023-10-25 ENCOUNTER — APPOINTMENT (OUTPATIENT)
Dept: CARDIAC REHAB | Age: 78
End: 2023-10-25
Payer: MEDICARE

## 2023-10-27 ENCOUNTER — APPOINTMENT (OUTPATIENT)
Dept: CARDIAC REHAB | Age: 78
End: 2023-10-27
Payer: MEDICARE

## 2023-10-30 ENCOUNTER — APPOINTMENT (OUTPATIENT)
Dept: CARDIAC REHAB | Age: 78
End: 2023-10-30
Payer: MEDICARE

## 2023-10-30 RX ORDER — ATORVASTATIN CALCIUM 40 MG/1
40 TABLET, FILM COATED ORAL DAILY
Qty: 90 TABLET | Refills: 1 | Status: SHIPPED | OUTPATIENT
Start: 2023-10-30

## 2024-01-02 ENCOUNTER — TELEPHONE (OUTPATIENT)
Dept: FAMILY MEDICINE CLINIC | Age: 79
End: 2024-01-02

## 2024-01-02 DIAGNOSIS — E11.42 TYPE 2 DIABETES MELLITUS WITH DIABETIC POLYNEUROPATHY, WITHOUT LONG-TERM CURRENT USE OF INSULIN (HCC): Primary | ICD-10-CM

## 2024-01-02 DIAGNOSIS — E53.8 VITAMIN B12 DEFICIENCY: ICD-10-CM

## 2024-01-02 DIAGNOSIS — E55.9 VITAMIN D DEFICIENCY: ICD-10-CM

## 2024-01-02 DIAGNOSIS — R79.89 ELEVATED TSH: ICD-10-CM

## 2024-01-02 NOTE — TELEPHONE ENCOUNTER
Spouse was checking to see if patient will need to have any fasting labs when she comes back for appt in April

## 2024-01-04 NOTE — TELEPHONE ENCOUNTER
Lm to call, patient can come in to do fasting labs a few days prior to next appt since there are no early morning appts

## 2024-01-31 ENCOUNTER — HOSPITAL ENCOUNTER (OUTPATIENT)
Dept: MAMMOGRAPHY | Age: 79
Discharge: HOME | End: 2024-01-31
Payer: MEDICARE

## 2024-01-31 DIAGNOSIS — Z12.31 SCREENING MAMMOGRAM, ENCOUNTER FOR: ICD-10-CM

## 2024-01-31 PROCEDURE — 77063 BREAST TOMOSYNTHESIS BI: CPT

## 2024-02-13 ENCOUNTER — OFFICE VISIT (OUTPATIENT)
Dept: FAMILY MEDICINE CLINIC | Age: 79
End: 2024-02-13
Payer: MEDICARE

## 2024-02-13 VITALS
OXYGEN SATURATION: 97 % | HEART RATE: 60 BPM | DIASTOLIC BLOOD PRESSURE: 78 MMHG | WEIGHT: 160.4 LBS | BODY MASS INDEX: 27.96 KG/M2 | SYSTOLIC BLOOD PRESSURE: 134 MMHG | TEMPERATURE: 98.1 F

## 2024-02-13 DIAGNOSIS — R09.89 RESPIRATORY SYMPTOMS: Primary | ICD-10-CM

## 2024-02-13 DIAGNOSIS — B37.31 VAGINAL YEAST INFECTION: ICD-10-CM

## 2024-02-13 LAB
INFLUENZA A ANTIGEN, POC: NEGATIVE
INFLUENZA B ANTIGEN, POC: NEGATIVE
LOT EXPIRE DATE: NORMAL
LOT KIT NUMBER: 0
SARS-COV-2, POC: NORMAL
VALID INTERNAL CONTROL: 0
VENDOR AND KIT NAME POC: NORMAL

## 2024-02-13 PROCEDURE — 1123F ACP DISCUSS/DSCN MKR DOCD: CPT | Performed by: NURSE PRACTITIONER

## 2024-02-13 PROCEDURE — G8417 CALC BMI ABV UP PARAM F/U: HCPCS | Performed by: NURSE PRACTITIONER

## 2024-02-13 PROCEDURE — G8484 FLU IMMUNIZE NO ADMIN: HCPCS | Performed by: NURSE PRACTITIONER

## 2024-02-13 PROCEDURE — 99213 OFFICE O/P EST LOW 20 MIN: CPT | Performed by: NURSE PRACTITIONER

## 2024-02-13 PROCEDURE — 3075F SYST BP GE 130 - 139MM HG: CPT | Performed by: NURSE PRACTITIONER

## 2024-02-13 PROCEDURE — 3078F DIAST BP <80 MM HG: CPT | Performed by: NURSE PRACTITIONER

## 2024-02-13 PROCEDURE — G8399 PT W/DXA RESULTS DOCUMENT: HCPCS | Performed by: NURSE PRACTITIONER

## 2024-02-13 PROCEDURE — 1036F TOBACCO NON-USER: CPT | Performed by: NURSE PRACTITIONER

## 2024-02-13 PROCEDURE — G8427 DOCREV CUR MEDS BY ELIG CLIN: HCPCS | Performed by: NURSE PRACTITIONER

## 2024-02-13 PROCEDURE — 1090F PRES/ABSN URINE INCON ASSESS: CPT | Performed by: NURSE PRACTITIONER

## 2024-02-13 PROCEDURE — 87428 SARSCOV & INF VIR A&B AG IA: CPT | Performed by: NURSE PRACTITIONER

## 2024-02-13 RX ORDER — AMOXICILLIN AND CLAVULANATE POTASSIUM 875; 125 MG/1; MG/1
1 TABLET, FILM COATED ORAL 2 TIMES DAILY
Qty: 20 TABLET | Refills: 0 | Status: SHIPPED | OUTPATIENT
Start: 2024-02-13 | End: 2024-02-23

## 2024-02-13 RX ORDER — FLUCONAZOLE 100 MG/1
100 TABLET ORAL DAILY
Qty: 10 TABLET | Refills: 0 | Status: SHIPPED | OUTPATIENT
Start: 2024-02-13 | End: 2024-02-23

## 2024-02-13 RX ORDER — BENZONATATE 200 MG/1
200 CAPSULE ORAL 3 TIMES DAILY PRN
Qty: 30 CAPSULE | Refills: 0 | Status: SHIPPED | OUTPATIENT
Start: 2024-02-13 | End: 2024-02-23

## 2024-02-13 NOTE — PATIENT INSTRUCTIONS
Please read the healthy family handout that you were given and share it with your family.       Please compare this printed medication list with your medications at home to be sure they are the same.  If you have any medications that are different please contact us immediately at [phone].     Also review your allergies that we have listed, these may also include medications that you have not been able to tolerate, make sure everything listed is correct. If you have any allergies that are different please contact us immediately at [phone].     You may receive a survey in the mail or by email asking about your experience during your visit today. Please complete and return to us so we know how we are serving you.

## 2024-02-19 RX ORDER — ATENOLOL 50 MG/1
50 TABLET ORAL DAILY
Qty: 90 TABLET | Refills: 3 | Status: SHIPPED | OUTPATIENT
Start: 2024-02-19 | End: 2025-02-13

## 2024-02-19 NOTE — TELEPHONE ENCOUNTER
Refill Request     CONFIRM preferrred pharmacy with the patient.    If Mail Order Rx - Pend for 90 day refill.      Last Seen: Last Seen Department: 10/9/2023  Last Seen by PCP: 12/20/2023    Last Written: 4/20/23    If no future appointment scheduled, route STAFF MESSAGE with patient name to the  Pool for scheduling.      Next Appointment:   Future Appointments   Date Time Provider Department Center   4/24/2024  2:20 PM Ramin Marroquin MD [facility] [place] [facility] [place]       Message sent to  to schedule appt with patient?  N/A      Requested Prescriptions     Pending Prescriptions Disp Refills    atenolol (TENORMIN) 50 MG tablet [Pharmacy Med Name: Atenolol 50 MG Oral Tablet] 90 tablet 3     Sig: TAKE 1 TABLET BY MOUTH DAILY

## 2024-02-20 ENCOUNTER — TELEPHONE (OUTPATIENT)
Dept: FAMILY MEDICINE CLINIC | Age: 79
End: 2024-02-20

## 2024-02-20 RX ORDER — GLIPIZIDE 10 MG/1
TABLET ORAL
Qty: 135 TABLET | Refills: 1 | Status: CANCELLED | OUTPATIENT
Start: 2024-02-20

## 2024-02-20 NOTE — TELEPHONE ENCOUNTER
Pended order in the system for her Glipizide.   Need to confirm how she is taking it before submitting the order.    I left her a message on her vm to call the office back.

## 2024-02-26 ENCOUNTER — OFFICE VISIT (OUTPATIENT)
Dept: FAMILY MEDICINE CLINIC | Age: 79
End: 2024-02-26
Payer: MEDICARE

## 2024-02-26 VITALS
HEART RATE: 59 BPM | DIASTOLIC BLOOD PRESSURE: 60 MMHG | WEIGHT: 154 LBS | OXYGEN SATURATION: 97 % | TEMPERATURE: 98 F | SYSTOLIC BLOOD PRESSURE: 97 MMHG | BODY MASS INDEX: 26.85 KG/M2

## 2024-02-26 DIAGNOSIS — J10.1 INFLUENZA A: Primary | ICD-10-CM

## 2024-02-26 LAB
INFLUENZA A ANTIGEN, POC: POSITIVE
INFLUENZA B ANTIGEN, POC: NEGATIVE
LOT EXPIRE DATE: NORMAL
LOT KIT NUMBER: NORMAL
SARS-COV-2, POC: NORMAL
VALID INTERNAL CONTROL: NORMAL
VENDOR AND KIT NAME POC: NORMAL

## 2024-02-26 PROCEDURE — G8399 PT W/DXA RESULTS DOCUMENT: HCPCS | Performed by: NURSE PRACTITIONER

## 2024-02-26 PROCEDURE — G8417 CALC BMI ABV UP PARAM F/U: HCPCS | Performed by: NURSE PRACTITIONER

## 2024-02-26 PROCEDURE — 1036F TOBACCO NON-USER: CPT | Performed by: NURSE PRACTITIONER

## 2024-02-26 PROCEDURE — G8427 DOCREV CUR MEDS BY ELIG CLIN: HCPCS | Performed by: NURSE PRACTITIONER

## 2024-02-26 PROCEDURE — 87428 SARSCOV & INF VIR A&B AG IA: CPT | Performed by: NURSE PRACTITIONER

## 2024-02-26 PROCEDURE — G8484 FLU IMMUNIZE NO ADMIN: HCPCS | Performed by: NURSE PRACTITIONER

## 2024-02-26 PROCEDURE — 1090F PRES/ABSN URINE INCON ASSESS: CPT | Performed by: NURSE PRACTITIONER

## 2024-02-26 PROCEDURE — 3078F DIAST BP <80 MM HG: CPT | Performed by: NURSE PRACTITIONER

## 2024-02-26 PROCEDURE — 99213 OFFICE O/P EST LOW 20 MIN: CPT | Performed by: NURSE PRACTITIONER

## 2024-02-26 PROCEDURE — 3074F SYST BP LT 130 MM HG: CPT | Performed by: NURSE PRACTITIONER

## 2024-02-26 PROCEDURE — 1123F ACP DISCUSS/DSCN MKR DOCD: CPT | Performed by: NURSE PRACTITIONER

## 2024-02-26 RX ORDER — OSELTAMIVIR PHOSPHATE 75 MG/1
75 CAPSULE ORAL 2 TIMES DAILY
Qty: 10 CAPSULE | Refills: 0 | Status: SHIPPED | OUTPATIENT
Start: 2024-02-26 | End: 2024-03-02

## 2024-02-26 ASSESSMENT — ENCOUNTER SYMPTOMS
SINUS PAIN: 1
COUGH: 1
SINUS PRESSURE: 1
GASTROINTESTINAL NEGATIVE: 1

## 2024-02-26 NOTE — PROGRESS NOTES
CHIEF COMPLAINT  Chief Complaint   Patient presents with    Congestion    Cough        HPI   Stephanie Saunders is a 78 y.o. female who presents to the office cough, congestion, fever, chills, body aches.  Patient recently treated for URI 2 weeks ago with antibiotics.  Patient's  had similar symptoms.  Patient daughter recently tested for influenza on Friday.  Patient reports that her symptoms worsened on Saturday.  No vomiting or diarrhea.  Patient reports taking medications as directed.  No chest pain or shortness of breath.  No other complaints, modifying factors or associated symptoms.     Nursing notes reviewed.   Past Medical History:   Diagnosis Date    CTS (carpal tunnel syndrome)     DM (diabetes mellitus) (HCC)     foot , micral     Elevated LFT's     HTN     Hyperlipidemia     neg gxt     Lung mass     stable    Peripheral neuropathy     Protein in urine      Past Surgical History:   Procedure Laterality Date    HYSTERECTOMY (CERVIX STATUS UNKNOWN)      LUMBAR DISC SURGERY      TRE AND BSO (CERVIX REMOVED)       Family History   Problem Relation Age of Onset    Unknown Mother     Cancer Father      Social History     Socioeconomic History    Marital status:      Spouse name: Not on file    Number of children: Not on file    Years of education: Not on file    Highest education level: Not on file   Occupational History     Employer: RETIRED   Tobacco Use    Smoking status: Former     Current packs/day: 0.00     Average packs/day: 1 pack/day for 44.2 years (44.2 ttl pk-yrs)     Types: Cigarettes     Start date:      Quit date: 3/15/2003     Years since quittin.9    Smokeless tobacco: Never    Tobacco comments:     Patient reported she quit for 10 years in the 's. Has now been smoke free since n2003. Patient always smoked 1  pk a day.    Vaping Use    Vaping Use: Never used   Substance and Sexual Activity    Alcohol use: No    Drug use: No    Sexual activity: Not on

## 2024-02-26 NOTE — PATIENT INSTRUCTIONS
Please read the healthy family handout that you were given and share it with your family.       Please compare this printed medication list with your medications at home to be sure they are the same.  If you have any medications that are different please contact us immediately at [phone].     Also review your allergies that we have listed, these may also include medications that you have not been able to tolerate, make sure everything listed is correct. If you have any allergies that are different please contact us immediately at [phone].     You may receive a survey in the mail or by email asking about your experience during your visit today. Please complete and return to us so we know how we are serving you.

## 2024-03-01 ENCOUNTER — OFFICE VISIT (OUTPATIENT)
Dept: FAMILY MEDICINE CLINIC | Age: 79
End: 2024-03-01

## 2024-03-01 VITALS
HEART RATE: 55 BPM | HEIGHT: 64 IN | TEMPERATURE: 98 F | SYSTOLIC BLOOD PRESSURE: 134 MMHG | DIASTOLIC BLOOD PRESSURE: 70 MMHG | OXYGEN SATURATION: 99 % | BODY MASS INDEX: 26.46 KG/M2 | WEIGHT: 155 LBS

## 2024-03-01 DIAGNOSIS — R30.0 DYSURIA: Primary | ICD-10-CM

## 2024-03-01 DIAGNOSIS — N30.01 ACUTE CYSTITIS WITH HEMATURIA: ICD-10-CM

## 2024-03-01 LAB
BILIRUBIN, POC: NORMAL
BLOOD URINE, POC: NORMAL
CLARITY, POC: NORMAL
COLOR, POC: YELLOW
GLUCOSE URINE, POC: 500
KETONES, POC: NORMAL
LEUKOCYTE EST, POC: NORMAL
NITRITE, POC: NORMAL
PH, POC: 6
PROTEIN, POC: NORMAL
SPECIFIC GRAVITY, POC: 1
UROBILINOGEN, POC: 0.2

## 2024-03-01 RX ORDER — CEPHALEXIN 500 MG/1
500 CAPSULE ORAL 2 TIMES DAILY
Qty: 20 CAPSULE | Refills: 0 | Status: SHIPPED | OUTPATIENT
Start: 2024-03-01

## 2024-03-01 ASSESSMENT — ENCOUNTER SYMPTOMS
EYES NEGATIVE: 1
RESPIRATORY NEGATIVE: 1
GASTROINTESTINAL NEGATIVE: 1

## 2024-03-01 NOTE — PROGRESS NOTES
CHIEF COMPLAINT  Chief Complaint   Patient presents with    Urinary Urgency     Blood in urine, burning with urination         HPI   Stephanie Saunders is a 78 y.o. female who presents to the office complaining of urinary urgency, frequency, retention and hematuria.  Patient reports symptoms started suddenly this morning when she woke up.  Patient recently treated for influenza.  Patient denies any current fever, chills, nausea vomiting or diarrhea.  Patient reports some lower abdominal pressures no low back pain.  No other complaints, modifying factors or associated symptoms.     Nursing notes reviewed.   Past Medical History:   Diagnosis Date    CTS (carpal tunnel syndrome)     DM (diabetes mellitus) (HCC)     foot , micral     Elevated LFT's     HTN     Hyperlipidemia     neg gxt     Lung mass     stable    Peripheral neuropathy     Protein in urine      Past Surgical History:   Procedure Laterality Date    HYSTERECTOMY (CERVIX STATUS UNKNOWN)      LUMBAR DISC SURGERY      TRE AND BSO (CERVIX REMOVED)       Family History   Problem Relation Age of Onset    Unknown Mother     Cancer Father      Social History     Socioeconomic History    Marital status:      Spouse name: Not on file    Number of children: Not on file    Years of education: Not on file    Highest education level: Not on file   Occupational History     Employer: RETIRED   Tobacco Use    Smoking status: Former     Current packs/day: 0.00     Average packs/day: 1 pack/day for 44.2 years (44.2 ttl pk-yrs)     Types: Cigarettes     Start date:      Quit date: 3/15/2003     Years since quittin.9    Smokeless tobacco: Never    Tobacco comments:     Patient reported she quit for 10 years in the 's. Has now been smoke free since n2003. Patient always smoked 1  pk a day.    Vaping Use    Vaping Use: Never used   Substance and Sexual Activity    Alcohol use: No    Drug use: No    Sexual activity: Not on file   Other Topics

## 2024-03-03 LAB
BACTERIA UR CULT: ABNORMAL
BACTERIA UR CULT: ABNORMAL
ORGANISM: ABNORMAL

## 2024-03-04 LAB
BACTERIA UR CULT: ABNORMAL
BACTERIA UR CULT: ABNORMAL
ORGANISM: ABNORMAL

## 2024-03-06 RX ORDER — GLIPIZIDE 10 MG/1
TABLET ORAL
Qty: 360 TABLET | Refills: 3 | Status: SHIPPED | OUTPATIENT
Start: 2024-03-06

## 2024-03-11 ENCOUNTER — TELEPHONE (OUTPATIENT)
Dept: FAMILY MEDICINE CLINIC | Age: 79
End: 2024-03-11

## 2024-03-11 NOTE — TELEPHONE ENCOUNTER
Pt called.   She has a yeast infection from an antibiotic and a sinus infection as well.  Sxs of yeast infection is vaginal itching and the sinus sxs are sinus pain and pressure in her face, very tender, nasally as well.    Can you call something for both?   Pls advise.   Uses [facility]

## 2024-03-13 RX ORDER — FLUCONAZOLE 150 MG/1
150 TABLET ORAL DAILY
Qty: 3 TABLET | Refills: 3 | Status: SHIPPED | OUTPATIENT
Start: 2024-03-13 | End: 2024-03-16

## 2024-03-13 NOTE — TELEPHONE ENCOUNTER
Patient notified, states that sinus infection has cleared up. Would like to know if something can be called in for the yeast infection? States daughter is in the hospital and is unable to make appointment. Please advise.      [facility]

## 2024-03-19 NOTE — PROGRESS NOTES
Called and left a message for patient to call me back    Subjective:  Inna Ruiz is here to discuss the following issues. She has chronic intermittent lower extremity edema and uses Lasix when required. Recently she has had very little swelling. No chest pain pressure orthopnea. She has diabetes and blood sugars average about 1:15 in the morning fasting. She continues on her combination of medicines. No symptomatic low readings polydipsia or polyuria. She has hypertension and home blood pressures consistently are well controlled. She has a history of B12 deficiency and continues on her supplement  Social History     Tobacco Use   Smoking Status Former Smoker    Packs/day: 1.00    Years: 20.00    Pack years: 20.00    Quit date: 3/15/2003    Years since quittin.2   Smokeless Tobacco Never Used   Allergies:     Actos [pioglitazone hydrochloride] and Levaquin [levofloxacin]    Objective:  BP (!) 154/54   Pulse 55   Temp 98.4 °F (36.9 °C) (Oral)   Wt 166 lb (75.3 kg)   SpO2 94%   BMI 30.36 kg/m²    No acute distress, heart regular rate and rhythm without murmur, lungs clear to auscultation easy effort, abdomen soft nondistended, no clubbing or cyanosis    Assessment:  1. Lower extremity edema    2. Type 2 diabetes mellitus with diabetic polyneuropathy, without long-term current use of insulin ([address]    3. Essential hypertension    4. Vitamin B12 deficiency            Plan:  Labs ordered  Blood pressures and blood sugars at home have been excellent  Continue as needed use of Lasix and other medications  She will schedule an eye exam  Follow-up in 4 months fasting or as needed  The diagnoses listed in the assessment above are stable unless otherwise indicated. Age-specific preventative health recommendations were reviewed and the [facility]" was provided. Avoid exposure to tobacco products. Follow CDC guidelines for covid-19 prevention.   Read and consider all information provided by the pharmacy regarding prescribed medications before use    Call or return for any problems that arise before the next scheduled appointment. Coretta Guo MD    This note was transcribed using a voice recognition software system. Proper technique and careful oversight were used to increase transcription accuracy but inadvertent errors may be present.

## 2024-03-25 RX ORDER — ATORVASTATIN CALCIUM 40 MG/1
40 TABLET, FILM COATED ORAL DAILY
Qty: 90 TABLET | Refills: 3 | Status: SHIPPED | OUTPATIENT
Start: 2024-03-25

## 2024-05-15 ENCOUNTER — OFFICE VISIT (OUTPATIENT)
Dept: FAMILY MEDICINE CLINIC | Age: 79
End: 2024-05-15

## 2024-05-15 VITALS
DIASTOLIC BLOOD PRESSURE: 73 MMHG | HEART RATE: 61 BPM | SYSTOLIC BLOOD PRESSURE: 124 MMHG | WEIGHT: 158.8 LBS | BODY MASS INDEX: 27.69 KG/M2 | OXYGEN SATURATION: 94 %

## 2024-05-15 DIAGNOSIS — R79.89 ELEVATED TSH: ICD-10-CM

## 2024-05-15 DIAGNOSIS — I42.1 HOCM (HYPERTROPHIC OBSTRUCTIVE CARDIOMYOPATHY) (HCC): ICD-10-CM

## 2024-05-15 DIAGNOSIS — E11.42 TYPE 2 DIABETES MELLITUS WITH DIABETIC POLYNEUROPATHY, WITHOUT LONG-TERM CURRENT USE OF INSULIN (HCC): Primary | ICD-10-CM

## 2024-05-15 DIAGNOSIS — I10 ESSENTIAL HYPERTENSION: ICD-10-CM

## 2024-05-15 DIAGNOSIS — Z98.890 H/O MYOMECTOMY: ICD-10-CM

## 2024-05-15 DIAGNOSIS — I48.91 ATRIAL FIBRILLATION WITH RVR (HCC): ICD-10-CM

## 2024-05-15 NOTE — PROGRESS NOTES
Subjective:  Stephanie Saunders is here to discuss the following issues.  She has diabetes and has had no polydipsia or polyuria.  She continues on a combination of 3 medications.  Her weight is stable and she tries to follow appropriate diet.  She has history of A-fib and hypertrophic obstructive cardiomyopathy.  About 1 year ago she had a myomectomy.  She had a recent follow-up yesterday with cardiology and is wearing a 3-day heart monitor.  This is precautionary as no symptoms have occurred including palpitations chest pain pressure.  No edema orthopnea.  No lightheadedness or syncope.  She has history of elevated TSH without related symptoms and is due for blood work.  She has hypertension and blood pressures remain very well-controlled on atenolol.  Social History     Tobacco Use   Smoking Status Former    Current packs/day: 0.00    Average packs/day: 1 pack/day for 44.2 years (44.2 ttl pk-yrs)    Types: Cigarettes    Start date:     Quit date: 3/15/2003    Years since quittin.1   Smokeless Tobacco Never   Tobacco Comments    Patient reported she quit for 10 years in the 's. Has now been smoke free since n2003. Patient always smoked 1  pk a day.    Allergies:     Actos [pioglitazone hydrochloride] and Levaquin [levofloxacin]    Objective:  /73 (Site: Left Upper Arm, Position: Sitting, Cuff Size: Medium Adult)   Pulse 61   Wt 72 kg (158 lb 12.8 oz)   SpO2 94%   BMI 27.69 kg/m²    No acute distress, heart regular rate and rhythm without murmur, lungs clear to auscultation easy effort, abdomen nondistended, no clubbing or cyanosis    Assessment:  1. Type 2 diabetes mellitus with diabetic polyneuropathy, without long-term current use of insulin ([facility])    2. Atrial fibrillation with RVR ([facility])    3. H/O myomectomy    4. HOCM (hypertrophic obstructive cardiomyopathy) ([facility])    5. Elevated TSH    6. Essential hypertension            Plan:  Future labs ordered  She is currently wearing a 3-day heart

## 2024-05-24 ENCOUNTER — NURSE ONLY (OUTPATIENT)
Dept: FAMILY MEDICINE CLINIC | Age: 79
End: 2024-05-24
Payer: MEDICARE

## 2024-05-24 DIAGNOSIS — Z98.890 H/O MYOMECTOMY: ICD-10-CM

## 2024-05-24 DIAGNOSIS — R79.89 ELEVATED TSH: ICD-10-CM

## 2024-05-24 DIAGNOSIS — E53.8 VITAMIN B12 DEFICIENCY: ICD-10-CM

## 2024-05-24 DIAGNOSIS — E11.42 TYPE 2 DIABETES MELLITUS WITH DIABETIC POLYNEUROPATHY, WITHOUT LONG-TERM CURRENT USE OF INSULIN (HCC): ICD-10-CM

## 2024-05-24 DIAGNOSIS — E55.9 VITAMIN D DEFICIENCY: ICD-10-CM

## 2024-05-24 DIAGNOSIS — I42.1 HOCM (HYPERTROPHIC OBSTRUCTIVE CARDIOMYOPATHY) (HCC): ICD-10-CM

## 2024-05-24 DIAGNOSIS — I10 ESSENTIAL HYPERTENSION: ICD-10-CM

## 2024-05-24 DIAGNOSIS — I48.91 ATRIAL FIBRILLATION WITH RVR (HCC): ICD-10-CM

## 2024-05-24 LAB
ALT SERPL-CCNC: 13 U/L (ref 10–40)
ANION GAP SERPL CALCULATED.3IONS-SCNC: 12 MMOL/L (ref 3–16)
BUN SERPL-MCNC: 19 MG/DL (ref 7–20)
CALCIUM SERPL-MCNC: 10 MG/DL (ref 8.3–10.6)
CHLORIDE SERPL-SCNC: 101 MMOL/L (ref 99–110)
CHOLEST SERPL-MCNC: 144 MG/DL (ref 0–199)
CO2 SERPL-SCNC: 25 MMOL/L (ref 21–32)
CREAT SERPL-MCNC: 1 MG/DL (ref 0.6–1.2)
GFR SERPLBLD CREATININE-BSD FMLA CKD-EPI: 58 ML/MIN/{1.73_M2}
GLUCOSE SERPL-MCNC: 175 MG/DL (ref 70–99)
HDLC SERPL-MCNC: 33 MG/DL (ref 40–60)
LDLC SERPL CALC-MCNC: 67 MG/DL
POTASSIUM SERPL-SCNC: 4.7 MMOL/L (ref 3.5–5.1)
SODIUM SERPL-SCNC: 138 MMOL/L (ref 136–145)
TRIGL SERPL-MCNC: 219 MG/DL (ref 0–150)
TSH SERPL DL<=0.005 MIU/L-ACNC: 3.56 UIU/ML (ref 0.27–4.2)
VLDLC SERPL CALC-MCNC: 44 MG/DL

## 2024-05-24 PROCEDURE — 36415 COLL VENOUS BLD VENIPUNCTURE: CPT | Performed by: FAMILY MEDICINE

## 2024-05-24 NOTE — PROGRESS NOTES
Blood drawn per physician order. 21 gauge needle used. Location rt ac space w/o incident.  Pressure applied until bleeding stopped.  Bandaid applied.  Patient instructed to call or return if problems with bleeding.   3 tubes drawn.

## 2024-05-25 LAB
25(OH)D3 SERPL-MCNC: 56.3 NG/ML
EST. AVERAGE GLUCOSE BLD GHB EST-MCNC: 151.3 MG/DL
HBA1C MFR BLD: 6.9 %

## 2024-06-20 ENCOUNTER — HOSPITAL ENCOUNTER (OUTPATIENT)
Age: 79
Discharge: HOME | End: 2024-06-20
Payer: MEDICARE

## 2024-06-20 ENCOUNTER — HOSPITAL ENCOUNTER (OUTPATIENT)
Dept: GENERAL RADIOLOGY | Age: 79
Discharge: HOME | End: 2024-06-20
Payer: MEDICARE

## 2024-06-20 ENCOUNTER — OFFICE VISIT (OUTPATIENT)
Dept: FAMILY MEDICINE CLINIC | Age: 79
End: 2024-06-20

## 2024-06-20 VITALS
TEMPERATURE: 97.7 F | BODY MASS INDEX: 26.82 KG/M2 | OXYGEN SATURATION: 97 % | WEIGHT: 157.13 LBS | HEIGHT: 64 IN | SYSTOLIC BLOOD PRESSURE: 113 MMHG | HEART RATE: 79 BPM | DIASTOLIC BLOOD PRESSURE: 71 MMHG

## 2024-06-20 DIAGNOSIS — L03.115 CELLULITIS OF RIGHT LOWER EXTREMITY: ICD-10-CM

## 2024-06-20 DIAGNOSIS — M79.661 PAIN OF RIGHT LOWER LEG: ICD-10-CM

## 2024-06-20 DIAGNOSIS — H60.312 ACUTE DIFFUSE OTITIS EXTERNA OF LEFT EAR: ICD-10-CM

## 2024-06-20 DIAGNOSIS — M25.571 ACUTE RIGHT ANKLE PAIN: ICD-10-CM

## 2024-06-20 DIAGNOSIS — M25.571 ACUTE RIGHT ANKLE PAIN: Primary | ICD-10-CM

## 2024-06-20 PROCEDURE — 73590 X-RAY EXAM OF LOWER LEG: CPT

## 2024-06-20 PROCEDURE — 73610 X-RAY EXAM OF ANKLE: CPT

## 2024-06-20 RX ORDER — DOXYCYCLINE HYCLATE 100 MG
100 TABLET ORAL 2 TIMES DAILY
Qty: 20 TABLET | Refills: 0 | Status: SHIPPED | OUTPATIENT
Start: 2024-06-20 | End: 2024-06-30

## 2024-06-20 SDOH — ECONOMIC STABILITY: FOOD INSECURITY: WITHIN THE PAST 12 MONTHS, YOU WORRIED THAT YOUR FOOD WOULD RUN OUT BEFORE YOU GOT MONEY TO BUY MORE.: NEVER TRUE

## 2024-06-20 SDOH — ECONOMIC STABILITY: FOOD INSECURITY: WITHIN THE PAST 12 MONTHS, THE FOOD YOU BOUGHT JUST DIDN'T LAST AND YOU DIDN'T HAVE MONEY TO GET MORE.: NEVER TRUE

## 2024-06-20 SDOH — ECONOMIC STABILITY: INCOME INSECURITY: HOW HARD IS IT FOR YOU TO PAY FOR THE VERY BASICS LIKE FOOD, HOUSING, MEDICAL CARE, AND HEATING?: NOT HARD AT ALL

## 2024-06-20 ASSESSMENT — ENCOUNTER SYMPTOMS
SHORTNESS OF BREATH: 0
GASTROINTESTINAL NEGATIVE: 1
EYES NEGATIVE: 1
ALLERGIC/IMMUNOLOGIC NEGATIVE: 1
CHEST TIGHTNESS: 0
COLOR CHANGE: 1
RESPIRATORY NEGATIVE: 1
COUGH: 0

## 2024-06-20 ASSESSMENT — PATIENT HEALTH QUESTIONNAIRE - PHQ9
SUM OF ALL RESPONSES TO PHQ9 QUESTIONS 1 & 2: 0
SUM OF ALL RESPONSES TO PHQ QUESTIONS 1-9: 0
2. FEELING DOWN, DEPRESSED OR HOPELESS: NOT AT ALL
SUM OF ALL RESPONSES TO PHQ QUESTIONS 1-9: 0
1. LITTLE INTEREST OR PLEASURE IN DOING THINGS: NOT AT ALL

## 2024-06-20 NOTE — PROGRESS NOTES
Smokeless tobacco: Never    Tobacco comments:     Patient reported she quit for 10 years in the 1970's. Has now been smoke free since n2003. Patient always smoked 1  pk a day.    Substance Use Topics    Alcohol use: No       Objective:   /71 (Site: Left Upper Arm, Position: Sitting, Cuff Size: Medium Adult)   Pulse 79   Temp 97.7 °F (36.5 °C)   Ht 1.626 m (5' 4\")   Wt 71.3 kg (157 lb 2 oz)   SpO2 97%   BMI 26.97 kg/m²     Physical Exam  Vitals and nursing note reviewed.   Constitutional:       General: She is not in acute distress.     Appearance: Normal appearance. She is well-developed and well-groomed.   HENT:      Head: Normocephalic and atraumatic.      Right Ear: Tympanic membrane normal.      Left Ear: Tympanic membrane normal. Swelling and tenderness present.   Eyes:      Extraocular Movements: Extraocular movements intact.      Conjunctiva/sclera: Conjunctivae normal.   Cardiovascular:      Rate and Rhythm: Normal rate and regular rhythm.      Pulses: Normal pulses.      Heart sounds: Normal heart sounds.   Pulmonary:      Effort: Pulmonary effort is normal. No accessory muscle usage or respiratory distress.      Breath sounds: Normal breath sounds.   Abdominal:      General: Bowel sounds are normal.      Palpations: Abdomen is soft.   Musculoskeletal:      Cervical back: Normal range of motion and neck supple.      Right lower leg: No edema.      Left lower leg: No edema.   Skin:     General: Skin is warm and dry.      Findings: Ecchymosis (fasing ecchymosis and swelling of right ankle.) and erythema present. No rash.             Comments: Right lower leg edema, erythema and mild warmth.    Neurological:      General: No focal deficit present.      Mental Status: She is alert and oriented to person, place, and time. Mental status is at baseline.   Psychiatric:         Attention and Perception: Attention normal.         Mood and Affect: Mood normal.         Speech: Speech normal.

## 2024-07-15 RX ORDER — ATORVASTATIN CALCIUM 40 MG/1
40 TABLET, FILM COATED ORAL DAILY
Qty: 90 TABLET | Refills: 1 | Status: SHIPPED | OUTPATIENT
Start: 2024-07-15

## 2024-07-30 ENCOUNTER — TELEPHONE (OUTPATIENT)
Dept: FAMILY MEDICINE CLINIC | Age: 79
End: 2024-07-30

## 2024-08-14 ENCOUNTER — TELEMEDICINE (OUTPATIENT)
Dept: FAMILY MEDICINE CLINIC | Age: 79
End: 2024-08-14

## 2024-08-14 DIAGNOSIS — Z00.00 MEDICARE ANNUAL WELLNESS VISIT, SUBSEQUENT: Primary | ICD-10-CM

## 2024-08-14 ASSESSMENT — PATIENT HEALTH QUESTIONNAIRE - PHQ9
SUM OF ALL RESPONSES TO PHQ QUESTIONS 1-9: 0
1. LITTLE INTEREST OR PLEASURE IN DOING THINGS: NOT AT ALL
2. FEELING DOWN, DEPRESSED OR HOPELESS: NOT AT ALL
SUM OF ALL RESPONSES TO PHQ QUESTIONS 1-9: 0
SUM OF ALL RESPONSES TO PHQ QUESTIONS 1-9: 0
SUM OF ALL RESPONSES TO PHQ9 QUESTIONS 1 & 2: 0
SUM OF ALL RESPONSES TO PHQ QUESTIONS 1-9: 0

## 2024-08-14 NOTE — PROGRESS NOTES
Medicare Annual Wellness Visit    Stephanie Saunders is here for Medicare AWV    Assessment & Plan   Medicare annual wellness visit, subsequent  Recommendations for Preventive Services Due: see orders and patient instructions/AVS.  Recommended screening schedule for the next 5-10 years is provided to the patient in written form: see Patient Instructions/AVS.     Return in 1 year (on 8/14/2025) for Medicare AWV.     Subjective       Patient's complete Health Risk Assessment and screening values have been reviewed and are found in Flowsheets. The following problems were reviewed today and where indicated follow up appointments were made and/or referrals ordered.    Positive Risk Factor Screenings with Interventions:               General HRA Questions:  Select all that apply: (!) New or Increased Fatigue (overworked at home)  Interventions Fatigue:  Nurse suggested patient try relaxing, take a break  See AVS for additional education material      Inactivity:  On average, how many days per week do you engage in moderate to strenuous exercise (like a brisk walk)?: 0 days (patient stated she needs to get back onto the routine) (!) Abnormal  On average, how many minutes do you engage in exercise at this level?: 0 min  Interventions:  See AVS for additional education material        Vision Screen:  Do you have difficulty driving, watching TV, or doing any of your daily activities because of your eyesight?: No  Have you had an eye exam within the past year?: (!) No (patient stated she needs to make appointment for eye exam)  Interventions:   See AVS for additional education material      Advanced Directives:  Do you have a Living Will?: (!) No (patient declined paperwork)    Intervention:  has NO advanced directive - not interested in additional information                     Objective    Patient-Reported Vitals  Patient-Reported Weight: 153lb  Patient-Reported Height: 5'4\"                 Allergies   Allergen Reactions     [Initial Evaluation] : an initial evaluation [FreeTextEntry2] : GLORIA

## 2024-08-22 RX ORDER — APIXABAN 5 MG/1
TABLET, FILM COATED ORAL
Qty: 180 TABLET | Refills: 3 | Status: SHIPPED | OUTPATIENT
Start: 2024-08-22

## 2024-08-22 NOTE — TELEPHONE ENCOUNTER
Future appt scheduled 11/27/2024                     Last appt 05/15/2024      Last Written 08/21/2023      ELIQUIS 5 MG TABS tablet   #180  3 RF

## 2024-09-30 ENCOUNTER — TELEPHONE (OUTPATIENT)
Dept: FAMILY MEDICINE CLINIC | Age: 79
End: 2024-09-30

## 2024-09-30 RX ORDER — FLUCONAZOLE 100 MG/1
100 TABLET ORAL DAILY
Qty: 7 TABLET | Refills: 0 | Status: SHIPPED | OUTPATIENT
Start: 2024-09-30 | End: 2024-10-07

## 2024-10-14 ENCOUNTER — TELEPHONE (OUTPATIENT)
Dept: FAMILY MEDICINE CLINIC | Age: 79
End: 2024-10-14

## 2024-10-14 ENCOUNTER — HOSPITAL ENCOUNTER (INPATIENT)
Age: 79
LOS: 1 days | Discharge: ANOTHER HOSPITAL | DRG: 281 | End: 2024-10-15
Attending: EMERGENCY MEDICINE | Admitting: INTERNAL MEDICINE
Payer: MEDICARE

## 2024-10-14 ENCOUNTER — HOSPITAL ENCOUNTER (OUTPATIENT)
Age: 79
Discharge: HOME | DRG: 281 | End: 2024-10-14
Payer: MEDICARE

## 2024-10-14 ENCOUNTER — APPOINTMENT (OUTPATIENT)
Dept: GENERAL RADIOLOGY | Age: 79
DRG: 281 | End: 2024-10-14
Payer: MEDICARE

## 2024-10-14 DIAGNOSIS — I42.9 CARDIOMYOPATHY, UNSPECIFIED TYPE (HCC): ICD-10-CM

## 2024-10-14 DIAGNOSIS — R00.1 BRADYCARDIA: Primary | ICD-10-CM

## 2024-10-14 DIAGNOSIS — R00.1 BRADYCARDIA: ICD-10-CM

## 2024-10-14 LAB
ALBUMIN SERPL-MCNC: 4.5 G/DL (ref 3.4–5)
ALBUMIN/GLOB SERPL: 1.5 {RATIO} (ref 1.1–2.2)
ALP SERPL-CCNC: 81 U/L (ref 40–129)
ALT SERPL-CCNC: 15 U/L (ref 10–40)
ANION GAP SERPL CALCULATED.3IONS-SCNC: 13 MMOL/L (ref 3–16)
AST SERPL-CCNC: 16 U/L (ref 15–37)
BASOPHILS # BLD: 0 K/UL (ref 0–0.2)
BASOPHILS NFR BLD: 0.3 %
BILIRUB SERPL-MCNC: 0.3 MG/DL (ref 0–1)
BUN SERPL-MCNC: 20 MG/DL (ref 7–20)
CALCIUM SERPL-MCNC: 10.1 MG/DL (ref 8.3–10.6)
CHLORIDE SERPL-SCNC: 100 MMOL/L (ref 99–110)
CO2 SERPL-SCNC: 24 MMOL/L (ref 21–32)
CREAT SERPL-MCNC: 1.1 MG/DL (ref 0.6–1.2)
DEPRECATED RDW RBC AUTO: 13.5 % (ref 12.4–15.4)
EOSINOPHIL # BLD: 0.1 K/UL (ref 0–0.6)
EOSINOPHIL NFR BLD: 0.8 %
GFR SERPLBLD CREATININE-BSD FMLA CKD-EPI: 51 ML/MIN/{1.73_M2}
GLUCOSE BLD-MCNC: 216 MG/DL (ref 70–99)
GLUCOSE SERPL-MCNC: 153 MG/DL (ref 70–99)
HCT VFR BLD AUTO: 43.7 % (ref 36–48)
HGB BLD-MCNC: 14.7 G/DL (ref 12–16)
LYMPHOCYTES # BLD: 2.2 K/UL (ref 1–5.1)
LYMPHOCYTES NFR BLD: 25.1 %
MCH RBC QN AUTO: 31.5 PG (ref 26–34)
MCHC RBC AUTO-ENTMCNC: 33.5 G/DL (ref 31–36)
MCV RBC AUTO: 93.8 FL (ref 80–100)
MONOCYTES # BLD: 0.9 K/UL (ref 0–1.3)
MONOCYTES NFR BLD: 10.1 %
NEUTROPHILS # BLD: 5.7 K/UL (ref 1.7–7.7)
NEUTROPHILS NFR BLD: 63.7 %
NT-PROBNP SERPL-MCNC: 2060 PG/ML (ref 0–449)
PERFORMED ON: ABNORMAL
PLATELET # BLD AUTO: 268 K/UL (ref 135–450)
PMV BLD AUTO: 8.9 FL (ref 5–10.5)
POTASSIUM SERPL-SCNC: 4.7 MMOL/L (ref 3.5–5.1)
PROT SERPL-MCNC: 7.6 G/DL (ref 6.4–8.2)
RBC # BLD AUTO: 4.66 M/UL (ref 4–5.2)
SODIUM SERPL-SCNC: 137 MMOL/L (ref 136–145)
TROPONIN, HIGH SENSITIVITY: 17 NG/L (ref 0–14)
TROPONIN, HIGH SENSITIVITY: 20 NG/L (ref 0–14)
WBC # BLD AUTO: 8.9 K/UL (ref 4–11)

## 2024-10-14 PROCEDURE — 85025 COMPLETE CBC W/AUTO DIFF WBC: CPT

## 2024-10-14 PROCEDURE — 84443 ASSAY THYROID STIM HORMONE: CPT

## 2024-10-14 PROCEDURE — 80053 COMPREHEN METABOLIC PANEL: CPT

## 2024-10-14 PROCEDURE — 99285 EMERGENCY DEPT VISIT HI MDM: CPT

## 2024-10-14 PROCEDURE — 93005 ELECTROCARDIOGRAM TRACING: CPT | Performed by: INTERNAL MEDICINE

## 2024-10-14 PROCEDURE — 83880 ASSAY OF NATRIURETIC PEPTIDE: CPT

## 2024-10-14 PROCEDURE — 84484 ASSAY OF TROPONIN QUANT: CPT

## 2024-10-14 PROCEDURE — 1200000000 HC SEMI PRIVATE

## 2024-10-14 PROCEDURE — 71045 X-RAY EXAM CHEST 1 VIEW: CPT

## 2024-10-14 PROCEDURE — 36415 COLL VENOUS BLD VENIPUNCTURE: CPT

## 2024-10-14 PROCEDURE — 83735 ASSAY OF MAGNESIUM: CPT

## 2024-10-14 PROCEDURE — 93005 ELECTROCARDIOGRAM TRACING: CPT | Performed by: EMERGENCY MEDICINE

## 2024-10-14 PROCEDURE — 85027 COMPLETE CBC AUTOMATED: CPT

## 2024-10-14 RX ORDER — ASPIRIN 81 MG/1
81 TABLET, CHEWABLE ORAL DAILY
Status: DISCONTINUED | OUTPATIENT
Start: 2024-10-15 | End: 2024-10-15 | Stop reason: HOSPADM

## 2024-10-14 RX ORDER — ONDANSETRON 2 MG/ML
4 INJECTION INTRAMUSCULAR; INTRAVENOUS EVERY 6 HOURS PRN
Status: DISCONTINUED | OUTPATIENT
Start: 2024-10-14 | End: 2024-10-15 | Stop reason: HOSPADM

## 2024-10-14 RX ORDER — POTASSIUM CHLORIDE 7.45 MG/ML
10 INJECTION INTRAVENOUS PRN
Status: DISCONTINUED | OUTPATIENT
Start: 2024-10-14 | End: 2024-10-15 | Stop reason: HOSPADM

## 2024-10-14 RX ORDER — POLYETHYLENE GLYCOL 3350 17 G/17G
17 POWDER, FOR SOLUTION ORAL DAILY PRN
Status: DISCONTINUED | OUTPATIENT
Start: 2024-10-14 | End: 2024-10-15 | Stop reason: HOSPADM

## 2024-10-14 RX ORDER — MAGNESIUM SULFATE IN WATER 40 MG/ML
2000 INJECTION, SOLUTION INTRAVENOUS PRN
Status: DISCONTINUED | OUTPATIENT
Start: 2024-10-14 | End: 2024-10-15 | Stop reason: HOSPADM

## 2024-10-14 RX ORDER — ACETAMINOPHEN 325 MG/1
650 TABLET ORAL EVERY 6 HOURS PRN
Status: DISCONTINUED | OUTPATIENT
Start: 2024-10-14 | End: 2024-10-15 | Stop reason: HOSPADM

## 2024-10-14 RX ORDER — SODIUM CHLORIDE 0.9 % (FLUSH) 0.9 %
5-40 SYRINGE (ML) INJECTION PRN
Status: DISCONTINUED | OUTPATIENT
Start: 2024-10-14 | End: 2024-10-15 | Stop reason: HOSPADM

## 2024-10-14 RX ORDER — POTASSIUM CHLORIDE 1500 MG/1
40 TABLET, EXTENDED RELEASE ORAL PRN
Status: DISCONTINUED | OUTPATIENT
Start: 2024-10-14 | End: 2024-10-15 | Stop reason: HOSPADM

## 2024-10-14 RX ORDER — ATORVASTATIN CALCIUM 40 MG/1
40 TABLET, FILM COATED ORAL NIGHTLY
Status: DISCONTINUED | OUTPATIENT
Start: 2024-10-15 | End: 2024-10-15 | Stop reason: SDUPTHER

## 2024-10-14 RX ORDER — ONDANSETRON 4 MG/1
4 TABLET, ORALLY DISINTEGRATING ORAL EVERY 8 HOURS PRN
Status: DISCONTINUED | OUTPATIENT
Start: 2024-10-14 | End: 2024-10-15 | Stop reason: HOSPADM

## 2024-10-14 RX ORDER — SODIUM CHLORIDE 9 MG/ML
INJECTION, SOLUTION INTRAVENOUS PRN
Status: DISCONTINUED | OUTPATIENT
Start: 2024-10-14 | End: 2024-10-15 | Stop reason: HOSPADM

## 2024-10-14 RX ORDER — SODIUM CHLORIDE 0.9 % (FLUSH) 0.9 %
5-40 SYRINGE (ML) INJECTION EVERY 12 HOURS SCHEDULED
Status: DISCONTINUED | OUTPATIENT
Start: 2024-10-15 | End: 2024-10-15 | Stop reason: HOSPADM

## 2024-10-14 RX ORDER — ACETAMINOPHEN 650 MG/1
650 SUPPOSITORY RECTAL EVERY 6 HOURS PRN
Status: DISCONTINUED | OUTPATIENT
Start: 2024-10-14 | End: 2024-10-15 | Stop reason: HOSPADM

## 2024-10-14 ASSESSMENT — PAIN - FUNCTIONAL ASSESSMENT: PAIN_FUNCTIONAL_ASSESSMENT: NONE - DENIES PAIN

## 2024-10-14 NOTE — ED PROVIDER NOTES
have NOT CHANGED    Details   metFORMIN (GLUCOPHAGE) 1000 MG tablet TAKE 1 TABLET BY MOUTH TWICE  DAILY WITH MEALS  Qty: 180 tablet, Refills: 3    Comments: Please send a replace/new response with 90-Day Supply if appropriate to maximize member benefit. Requesting 1 year supply.      ELIQUIS 5 MG TABS tablet TAKE 1 TABLET BY MOUTH TWICE  DAILY  Qty: 180 tablet, Refills: 3    Comments: Please send a replace/new response with 90-Day Supply if appropriate to maximize member benefit. Requesting 1 year supply.      atorvastatin (LIPITOR) 40 MG tablet TAKE 1 TABLET BY MOUTH EVERY DAY  Qty: 90 tablet, Refills: 1      glipiZIDE (GLUCOTROL) 10 MG tablet TAKE 2 TABLETS TWICE A DAY  BEFORE MEALS  Qty: 360 tablet, Refills: 3    Comments: Please send a replace/new response with 90-Day Supply if appropriate to maximize member benefit. Requesting 1 year supply.      empagliflozin (JARDIANCE) 10 MG tablet Take 1 tablet by mouth daily  Qty: 90 tablet, Refills: 1      spironolactone (ALDACTONE) 50 MG tablet Take 1 tablet by mouth daily Pt reports 2,  25 mg tablets      cyanocobalamin ([facility] VITAMIN B12) 1000 MCG tablet Take 1 tablet by mouth daily  Qty: 30 tablet, Refills: 3      Vitamin D (CHOLECALCIFEROL) 1000 UNITS CAPS capsule Take 1 capsule by mouth daily  Qty: 1 capsule, Refills: 0      Ascorbic Acid (VITAMIN C CR) 500 MG TBCR Take  by mouth.      aspirin 81 MG EC tablet Take 1 tablet by mouth daily             ALLERGIES     Actos [pioglitazone hydrochloride] and Levaquin [levofloxacin]    FAMILYHISTORY       Family History   Problem Relation Age of Onset    Unknown Mother     Cancer Father         SOCIAL HISTORY       Social History     Tobacco Use    Smoking status: Former     Current packs/day: 0.00     Average packs/day: 1 pack/day for 44.2 years (44.2 ttl pk-yrs)     Types: Cigarettes     Start date:      Quit date: 3/15/2003     Years since quittin.6    Smokeless tobacco: Never    Tobacco comments:     Patient  suppository 650 mg ( Rectal See Alternative 10/15/24 1213)   polyethylene glycol (GLYCOLAX) packet 17 g (has no administration in time range)   aspirin chewable tablet 81 mg (81 mg Oral Not Given 10/15/24 0915)   atorvastatin (LIPITOR) tablet 40 mg (40 mg Oral Given 10/15/24 0915)   apixaban (ELIQUIS) tablet 5 mg (5 mg Oral Given 10/15/24 0916)   empagliflozin (JARDIANCE) tablet 10 mg (10 mg Oral Given 10/15/24 0915)   Vitamin D (CHOLECALCIFEROL) tablet 1,000 Units (1,000 Units Oral Given 10/15/24 0915)   spironolactone (ALDACTONE) tablet 50 mg (50 mg Oral Given 10/15/24 0915)   metFORMIN (GLUCOPHAGE) tablet 1,000 mg ( Oral Automatically Held 10/18/24 1700)   glipiZIDE (GLUCOTROL) tablet 10 mg ( Oral Automatically Held 10/18/24 1600)   glucose chewable tablet 16 g (has no administration in time range)   dextrose bolus 10% 125 mL (has no administration in time range)     Or   dextrose bolus 10% 250 mL (has no administration in time range)   glucagon injection 1 mg (has no administration in time range)   dextrose 10 % infusion (has no administration in time range)             Is this patient to be included in the SEP-1 Core Measure due to severe sepsis or septic shock?   No   Exclusion criteria - the patient is NOT to be included for SEP-1 Core Measure due to:  Infection is not suspected      Chronic Conditions affecting care:    has a past medical history of CTS (carpal tunnel syndrome), DM (diabetes mellitus) ([facility]), Elevated LFT's, HTN, Hyperlipidemia, Lung mass, Peripheral neuropathy, and Protein in urine.    CONSULTS: (Who and What was discussed)      Records Reviewed (External and Source)   EKG 10/14/2024 prior to ED arrival shows possible secondary AV block    CC/HPI Summary, DDx, ED Course, and Reassessment:   This is a 79-year-old old female with a past medical history of hypertension and HOCM who presented to the emergency department for bradycardia and associated lightheadedness. Patient had a EKG prior to

## 2024-10-14 NOTE — TELEPHONE ENCOUNTER
Pt called back stating she spoke with her cardiologist and they told her to have her pcp order the EKG and labs so she fortune not have to go all the way down to [place].     Please advise. Thank You

## 2024-10-14 NOTE — TELEPHONE ENCOUNTER
I called Yaz back and patient had already gone. I was able to get her 's cell phone number and called her and they were almost. She was instructed to go back to the hospital to be evaluated in the ER.

## 2024-10-14 NOTE — TELEPHONE ENCOUNTER
It doesn't appear that her pulse has ever been in the 40s when she was seen in the office. Given her cardiac history, she should notify her cardiologist. If symptoms continue, recommend office visit for EKG / blood work.

## 2024-10-14 NOTE — ED TRIAGE NOTES
Patient presents to the ED with c/o bradycardia that started Friday night. Patient sees cardiology at [facility]. Reports feeling dizzy. Denies chest pain and shortness of breath.

## 2024-10-14 NOTE — TELEPHONE ENCOUNTER
Jade from [facility] states that EKG came back as critical.  Please advise.

## 2024-10-14 NOTE — TELEPHONE ENCOUNTER
Pt called and states that her pulse has been running low, and she has had an \"odd feeling\", stating its too hard to explain.   Bp and pulse monitoring     10/11/2024  8 am  Bp  120/58   P 71    8 pm BP  148/57  P 42 (felt odd)   930 pm  Bp 133/57   p93    Yesterday 10/13/2024  AM  Bp reading 133/57  p 75    436 pm Bp reading 143/61  p 43 (felt odd)    Todays 10/14/2024  Bp   827am 122/55  pulse 42  1200pm 126/49  p 44    Pt states she does not feel weird today, but pulse still running low.   Pt asking if she need to be concerned. Please advise. Thank you

## 2024-10-14 NOTE — ED PROVIDER NOTES
In addition to the advanced practice provider, I personally saw Stephanie Saunders and performed a substantive portion of the visit including all aspects of the medical decision making.    Medical Decision Making  Patient seen and evaluated at bedside.  Briefly, she is presenting with episodes of bradycardia.  She received an EKG earlier today which showed possible second-degree heart block.  She has been monitoring her heart rates and sometimes she states is low in the 40s.  When this occurs she begins feeling lightheadedness.  ECG in the ED shows sinus bradycardia with no evidence of heart block, however frequent premature atrial complexes.  The workup did show a mildly elevated troponin of 20 which is downtrending to 17 on repeat.  She is chest x-ray shows mild pulmonary vascular congestion.  Given that earlier EKG today did show significant bradycardia and possible heart block, will admit for continued telemetry monitoring/observation and further evaluation by cardiology.  Patient and family bedside agreeable to plan.    EKG  Sinus bradycardia with no acute ST elevations. Ventricular rate of 48 beats per minute. Frequent premature atrial complexes.  Left-sided axis deviation.  QTc of 425.    SEP-1  Is this patient to be included in the SEP-1 Core Measure due to severe sepsis or septic shock?   No     Exclusion criteria - the patient is NOT to be included for SEP-1 Core Measure due to:  2+ SIRS criteria are not met    Screenings     Whitney Coma Scale  Eye Opening: Spontaneous  Best Verbal Response: Oriented  Best Motor Response: Obeys commands  Whitney Coma Scale Score: 15             Patient Referrals:  No follow-up provider specified.    Discharge Medications:  New Prescriptions    No medications on file       FINAL IMPRESSION  1. Bradycardia        Blood pressure (!) 140/59, pulse 69, temperature 98.3 °F (36.8 °C), temperature source Oral, resp. rate 18, height 1.626 m (5' 4\"), weight 69.4 kg (153 lb), SpO2  100%.     I personally saw the patient and made/approved the management plan and take responsibility for the patient management.    For further details of Stephanie Saunders's emergency department encounter, please see documentation by advanced practice provider, Cristy Hawkins.        Iveth Arnett,   10/14/24 0593

## 2024-10-14 NOTE — TELEPHONE ENCOUNTER
Called and informed pt who is going to call her cardiologist who she states is through [facility]. And will f/u prn w/ pcp

## 2024-10-14 NOTE — TELEPHONE ENCOUNTER
Based on chart review, the episodes of low pulse rate could be due to her Atenolol. Has this been changed recently? Her cardiology note from July did not have this medication listed. Please clarify if she is taking this medication and dosage.

## 2024-10-15 ENCOUNTER — HOSPITAL ENCOUNTER (INPATIENT)
Age: 79
LOS: 3 days | Discharge: HOME | End: 2024-10-18
Attending: INTERNAL MEDICINE | Admitting: INTERNAL MEDICINE
Payer: MEDICARE

## 2024-10-15 ENCOUNTER — APPOINTMENT (OUTPATIENT)
Age: 79
DRG: 281 | End: 2024-10-15
Attending: STUDENT IN AN ORGANIZED HEALTH CARE EDUCATION/TRAINING PROGRAM
Payer: MEDICARE

## 2024-10-15 VITALS
TEMPERATURE: 97.7 F | BODY MASS INDEX: 26.29 KG/M2 | SYSTOLIC BLOOD PRESSURE: 147 MMHG | WEIGHT: 154 LBS | OXYGEN SATURATION: 96 % | DIASTOLIC BLOOD PRESSURE: 89 MMHG | RESPIRATION RATE: 14 BRPM | HEIGHT: 64 IN | HEART RATE: 79 BPM

## 2024-10-15 DIAGNOSIS — I49.9 ARRHYTHMIA: ICD-10-CM

## 2024-10-15 PROBLEM — I42.9 CARDIOMYOPATHY (HCC): Status: ACTIVE | Noted: 2022-12-04

## 2024-10-15 PROBLEM — I44.2 COMPLETE HEART BLOCK (HCC): Status: ACTIVE | Noted: 2024-10-15

## 2024-10-15 PROBLEM — I44.7 LBBB (LEFT BUNDLE BRANCH BLOCK): Status: ACTIVE | Noted: 2024-10-15

## 2024-10-15 PROBLEM — R55 NEAR SYNCOPE: Status: ACTIVE | Noted: 2024-10-15

## 2024-10-15 PROBLEM — Z98.890 H/O VENTRICULAR SEPTAL MYECTOMY: Status: ACTIVE | Noted: 2024-10-15

## 2024-10-15 LAB
ANION GAP SERPL CALCULATED.3IONS-SCNC: 11 MMOL/L (ref 3–16)
ANION GAP SERPL CALCULATED.3IONS-SCNC: 13 MMOL/L (ref 3–16)
BUN SERPL-MCNC: 18 MG/DL (ref 7–20)
BUN SERPL-MCNC: 21 MG/DL (ref 7–20)
CALCIUM SERPL-MCNC: 10.1 MG/DL (ref 8.3–10.6)
CALCIUM SERPL-MCNC: 9.7 MG/DL (ref 8.3–10.6)
CHLORIDE SERPL-SCNC: 100 MMOL/L (ref 99–110)
CHLORIDE SERPL-SCNC: 102 MMOL/L (ref 99–110)
CO2 SERPL-SCNC: 23 MMOL/L (ref 21–32)
CO2 SERPL-SCNC: 25 MMOL/L (ref 21–32)
CREAT SERPL-MCNC: 1.1 MG/DL (ref 0.6–1.2)
CREAT SERPL-MCNC: 1.3 MG/DL (ref 0.6–1.2)
DEPRECATED RDW RBC AUTO: 13.3 % (ref 12.4–15.4)
DEPRECATED RDW RBC AUTO: 13.4 % (ref 12.4–15.4)
ECHO AO ROOT DIAM: 3.2 CM
ECHO AO ROOT INDEX: 1.83 CM/M2
ECHO AV MEAN GRADIENT: 8 MMHG
ECHO AV MEAN VELOCITY: 1.4 M/S
ECHO AV PEAK GRADIENT: 16 MMHG
ECHO AV PEAK VELOCITY: 2 M/S
ECHO AV VELOCITY RATIO: 0.9
ECHO AV VTI: 31.7 CM
ECHO BSA: 1.78 M2
ECHO EST RA PRESSURE: 3 MMHG
ECHO IVC EXP: 1.1 CM
ECHO IVC INSP: 0.3 CM
ECHO LA AREA 2C: 26 CM2
ECHO LA AREA 4C: 26 CM2
ECHO LA MAJOR AXIS: 6.4 CM
ECHO LA MINOR AXIS: 6.7 CM
ECHO LA VOL BP: 82 ML (ref 22–52)
ECHO LA VOL MOD A2C: 82 ML (ref 22–52)
ECHO LA VOL MOD A4C: 78 ML (ref 22–52)
ECHO LA VOL/BSA BIPLANE: 47 ML/M2 (ref 16–34)
ECHO LA VOLUME INDEX MOD A2C: 47 ML/M2 (ref 16–34)
ECHO LA VOLUME INDEX MOD A4C: 45 ML/M2 (ref 16–34)
ECHO LV E' LATERAL VELOCITY: 9.9 CM/S
ECHO LV E' SEPTAL VELOCITY: 6.1 CM/S
ECHO LV EDV A2C: 27 ML
ECHO LV EDV A4C: 62 ML
ECHO LV EDV INDEX A4C: 35 ML/M2
ECHO LV EDV NDEX A2C: 15 ML/M2
ECHO LV EJECTION FRACTION A2C: 72 %
ECHO LV EJECTION FRACTION A4C: 64 %
ECHO LV EJECTION FRACTION BIPLANE: 70 % (ref 55–100)
ECHO LV ESV A2C: 7 ML
ECHO LV ESV A4C: 23 ML
ECHO LV ESV INDEX A2C: 4 ML/M2
ECHO LV ESV INDEX A4C: 13 ML/M2
ECHO LV FRACTIONAL SHORTENING: 39 % (ref 28–44)
ECHO LV INTERNAL DIMENSION DIASTOLE INDEX: 2.17 CM/M2
ECHO LV INTERNAL DIMENSION DIASTOLIC: 3.8 CM (ref 3.9–5.3)
ECHO LV INTERNAL DIMENSION SYSTOLIC INDEX: 1.31 CM/M2
ECHO LV INTERNAL DIMENSION SYSTOLIC: 2.3 CM
ECHO LV IVSD: 1.4 CM (ref 0.6–0.9)
ECHO LV MASS 2D: 194.1 G (ref 67–162)
ECHO LV MASS INDEX 2D: 110.9 G/M2 (ref 43–95)
ECHO LV POSTERIOR WALL DIASTOLIC: 1.4 CM (ref 0.6–0.9)
ECHO LV RELATIVE WALL THICKNESS RATIO: 0.74
ECHO LVOT AV VTI INDEX: 1.01
ECHO LVOT MEAN GRADIENT: 10 MMHG
ECHO LVOT PEAK GRADIENT: 13 MMHG
ECHO LVOT PEAK VELOCITY: 1.8 M/S
ECHO LVOT VTI: 32.1 CM
ECHO MV A VELOCITY: 0.8 M/S
ECHO MV E DECELERATION TIME (DT): 346 MS
ECHO MV E VELOCITY: 1.3 M/S
ECHO MV E/A RATIO: 1.63
ECHO MV E/E' LATERAL: 13.13
ECHO MV E/E' RATIO (AVERAGED): 17.22
ECHO MV E/E' SEPTAL: 21.31
ECHO MV LVOT VTI INDEX: 1.11
ECHO MV MAX VELOCITY: 1.4 M/S
ECHO MV MEAN GRADIENT: 8 MMHG
ECHO MV MEAN VELOCITY: 0.9 M/S
ECHO MV PEAK GRADIENT: 8 MMHG
ECHO MV VTI: 35.7 CM
ECHO PV MAX VELOCITY: 1.6 M/S
ECHO PV MEAN GRADIENT: 5 MMHG
ECHO PV MEAN VELOCITY: 1 M/S
ECHO PV PEAK GRADIENT: 11 MMHG
ECHO PV VTI: 24.1 CM
ECHO RA AREA 4C: 16.2 CM2
ECHO RA END SYSTOLIC VOLUME APICAL 4 CHAMBER INDEX BSA: 22 ML/M2
ECHO RA VOLUME: 39 ML
ECHO RIGHT VENTRICULAR SYSTOLIC PRESSURE (RVSP): 32 MMHG
ECHO RV BASAL DIMENSION: 3.6 CM
ECHO RV FREE WALL PEAK S': 8.7 CM/S
ECHO RV LONGITUDINAL DIMENSION: 6.7 CM
ECHO RV MID DIMENSION: 1.9 CM
ECHO RV TAPSE: 1.9 CM (ref 1.7–?)
ECHO TV REGURGITANT MAX VELOCITY: 2.7 M/S
ECHO TV REGURGITANT PEAK GRADIENT: 29 MMHG
EKG ATRIAL RATE: 48 BPM
EKG ATRIAL RATE: 85 BPM
EKG DIAGNOSIS: NORMAL
EKG DIAGNOSIS: NORMAL
EKG P AXIS: 78 DEGREES
EKG P AXIS: 83 DEGREES
EKG P-R INTERVAL: 190 MS
EKG Q-T INTERVAL: 476 MS
EKG Q-T INTERVAL: 518 MS
EKG QRS DURATION: 130 MS
EKG QRS DURATION: 146 MS
EKG QTC CALCULATION (BAZETT): 425 MS
EKG QTC CALCULATION (BAZETT): 432 MS
EKG R AXIS: -25 DEGREES
EKG R AXIS: 221 DEGREES
EKG T AXIS: 141 DEGREES
EKG T AXIS: 22 DEGREES
EKG VENTRICULAR RATE: 42 BPM
EKG VENTRICULAR RATE: 48 BPM
GFR SERPLBLD CREATININE-BSD FMLA CKD-EPI: 42 ML/MIN/{1.73_M2}
GFR SERPLBLD CREATININE-BSD FMLA CKD-EPI: 51 ML/MIN/{1.73_M2}
GLUCOSE BLD-MCNC: 157 MG/DL (ref 70–99)
GLUCOSE BLD-MCNC: 159 MG/DL (ref 70–99)
GLUCOSE BLD-MCNC: 189 MG/DL (ref 70–99)
GLUCOSE BLD-MCNC: 206 MG/DL (ref 70–99)
GLUCOSE SERPL-MCNC: 152 MG/DL (ref 70–99)
GLUCOSE SERPL-MCNC: 192 MG/DL (ref 70–99)
HCT VFR BLD AUTO: 40.7 % (ref 36–48)
HCT VFR BLD AUTO: 42.9 % (ref 36–48)
HGB BLD-MCNC: 13.9 G/DL (ref 12–16)
HGB BLD-MCNC: 14.6 G/DL (ref 12–16)
MAGNESIUM SERPL-MCNC: 1.94 MG/DL (ref 1.8–2.4)
MCH RBC QN AUTO: 31.9 PG (ref 26–34)
MCH RBC QN AUTO: 32.4 PG (ref 26–34)
MCHC RBC AUTO-ENTMCNC: 34.1 G/DL (ref 31–36)
MCHC RBC AUTO-ENTMCNC: 34.1 G/DL (ref 31–36)
MCV RBC AUTO: 93.6 FL (ref 80–100)
MCV RBC AUTO: 94.9 FL (ref 80–100)
PERFORMED ON: ABNORMAL
PLATELET # BLD AUTO: 233 K/UL (ref 135–450)
PLATELET # BLD AUTO: 275 K/UL (ref 135–450)
PMV BLD AUTO: 8.8 FL (ref 5–10.5)
PMV BLD AUTO: 9.6 FL (ref 5–10.5)
POTASSIUM SERPL-SCNC: 4.2 MMOL/L (ref 3.5–5.1)
POTASSIUM SERPL-SCNC: 5.3 MMOL/L (ref 3.5–5.1)
RBC # BLD AUTO: 4.35 M/UL (ref 4–5.2)
RBC # BLD AUTO: 4.52 M/UL (ref 4–5.2)
SODIUM SERPL-SCNC: 136 MMOL/L (ref 136–145)
SODIUM SERPL-SCNC: 138 MMOL/L (ref 136–145)
TROPONIN, HIGH SENSITIVITY: 19 NG/L (ref 0–14)
TROPONIN, HIGH SENSITIVITY: 20 NG/L (ref 0–14)
TSH SERPL DL<=0.005 MIU/L-ACNC: 3.94 UIU/ML (ref 0.27–4.2)
WBC # BLD AUTO: 6.7 K/UL (ref 4–11)
WBC # BLD AUTO: 9.9 K/UL (ref 4–11)

## 2024-10-15 PROCEDURE — 93306 TTE W/DOPPLER COMPLETE: CPT | Performed by: INTERNAL MEDICINE

## 2024-10-15 PROCEDURE — 2580000003 HC RX 258: Performed by: INTERNAL MEDICINE

## 2024-10-15 PROCEDURE — 93010 ELECTROCARDIOGRAM REPORT: CPT | Performed by: INTERNAL MEDICINE

## 2024-10-15 PROCEDURE — 99238 HOSP IP/OBS DSCHRG MGMT 30/<: CPT | Performed by: INTERNAL MEDICINE

## 2024-10-15 PROCEDURE — 80048 BASIC METABOLIC PNL TOTAL CA: CPT

## 2024-10-15 PROCEDURE — 6370000000 HC RX 637 (ALT 250 FOR IP): Performed by: INTERNAL MEDICINE

## 2024-10-15 PROCEDURE — 99222 1ST HOSP IP/OBS MODERATE 55: CPT | Performed by: STUDENT IN AN ORGANIZED HEALTH CARE EDUCATION/TRAINING PROGRAM

## 2024-10-15 PROCEDURE — 93306 TTE W/DOPPLER COMPLETE: CPT

## 2024-10-15 PROCEDURE — 85027 COMPLETE CBC AUTOMATED: CPT

## 2024-10-15 PROCEDURE — 84484 ASSAY OF TROPONIN QUANT: CPT

## 2024-10-15 PROCEDURE — 2060000000 HC ICU INTERMEDIATE R&B

## 2024-10-15 PROCEDURE — 99223 1ST HOSP IP/OBS HIGH 75: CPT | Performed by: INTERNAL MEDICINE

## 2024-10-15 PROCEDURE — 36415 COLL VENOUS BLD VENIPUNCTURE: CPT

## 2024-10-15 RX ORDER — GLUCAGON 1 MG/ML
1 KIT INJECTION PRN
Status: DISCONTINUED | OUTPATIENT
Start: 2024-10-15 | End: 2024-10-15 | Stop reason: HOSPADM

## 2024-10-15 RX ORDER — DEXTROSE MONOHYDRATE 100 MG/ML
INJECTION, SOLUTION INTRAVENOUS CONTINUOUS PRN
Status: DISCONTINUED | OUTPATIENT
Start: 2024-10-15 | End: 2024-10-15 | Stop reason: HOSPADM

## 2024-10-15 RX ORDER — INSULIN LISPRO 100 [IU]/ML
0-4 INJECTION, SOLUTION INTRAVENOUS; SUBCUTANEOUS
Status: DISCONTINUED | OUTPATIENT
Start: 2024-10-15 | End: 2024-10-18 | Stop reason: HOSPADM

## 2024-10-15 RX ORDER — GLIPIZIDE 5 MG/1
10 TABLET ORAL
Status: DISCONTINUED | OUTPATIENT
Start: 2024-10-15 | End: 2024-10-15 | Stop reason: HOSPADM

## 2024-10-15 RX ORDER — GLUCAGON 1 MG/ML
1 KIT INJECTION PRN
Status: DISCONTINUED | OUTPATIENT
Start: 2024-10-15 | End: 2024-10-18 | Stop reason: HOSPADM

## 2024-10-15 RX ORDER — ASPIRIN 81 MG/1
81 TABLET ORAL DAILY
Status: DISCONTINUED | OUTPATIENT
Start: 2024-10-15 | End: 2024-10-15

## 2024-10-15 RX ORDER — VITAMIN B COMPLEX
1000 TABLET ORAL DAILY
Status: DISCONTINUED | OUTPATIENT
Start: 2024-10-15 | End: 2024-10-15 | Stop reason: HOSPADM

## 2024-10-15 RX ORDER — SPIRONOLACTONE 25 MG/1
50 TABLET ORAL DAILY
Status: DISCONTINUED | OUTPATIENT
Start: 2024-10-15 | End: 2024-10-15 | Stop reason: HOSPADM

## 2024-10-15 RX ORDER — DEXTROSE MONOHYDRATE 100 MG/ML
INJECTION, SOLUTION INTRAVENOUS CONTINUOUS PRN
Status: DISCONTINUED | OUTPATIENT
Start: 2024-10-15 | End: 2024-10-18 | Stop reason: HOSPADM

## 2024-10-15 RX ORDER — ATORVASTATIN CALCIUM 40 MG/1
40 TABLET, FILM COATED ORAL DAILY
Status: DISCONTINUED | OUTPATIENT
Start: 2024-10-15 | End: 2024-10-15 | Stop reason: HOSPADM

## 2024-10-15 RX ADMIN — Medication 1000 UNITS: at 09:15

## 2024-10-15 RX ADMIN — APIXABAN 5 MG: 5 TABLET, FILM COATED ORAL at 01:18

## 2024-10-15 RX ADMIN — METFORMIN HYDROCHLORIDE 1000 MG: 500 TABLET ORAL at 09:15

## 2024-10-15 RX ADMIN — ATORVASTATIN CALCIUM 40 MG: 40 TABLET, FILM COATED ORAL at 09:15

## 2024-10-15 RX ADMIN — ACETAMINOPHEN 650 MG: 325 TABLET ORAL at 12:13

## 2024-10-15 RX ADMIN — APIXABAN 5 MG: 5 TABLET, FILM COATED ORAL at 09:16

## 2024-10-15 RX ADMIN — ATORVASTATIN CALCIUM 40 MG: 40 TABLET, FILM COATED ORAL at 00:13

## 2024-10-15 RX ADMIN — Medication 10 ML: at 09:25

## 2024-10-15 RX ADMIN — SPIRONOLACTONE 50 MG: 25 TABLET ORAL at 09:15

## 2024-10-15 RX ADMIN — EMPAGLIFLOZIN 10 MG: 10 TABLET, FILM COATED ORAL at 09:15

## 2024-10-15 ASSESSMENT — ENCOUNTER SYMPTOMS
HEMATEMESIS: 0
RIGHT EYE: 0
LEFT EYE: 0
HEMATOCHEZIA: 0
STRIDOR: 0
WHEEZING: 0
SHORTNESS OF BREATH: 0

## 2024-10-15 ASSESSMENT — PAIN SCALES - GENERAL
PAINLEVEL_OUTOF10: 0
PAINLEVEL_OUTOF10: 6
PAINLEVEL_OUTOF10: 0
PAINLEVEL_OUTOF10: 0

## 2024-10-15 ASSESSMENT — PAIN DESCRIPTION - ORIENTATION: ORIENTATION: RIGHT

## 2024-10-15 ASSESSMENT — PAIN - FUNCTIONAL ASSESSMENT: PAIN_FUNCTIONAL_ASSESSMENT: ACTIVITIES ARE NOT PREVENTED

## 2024-10-15 ASSESSMENT — PAIN DESCRIPTION - DESCRIPTORS: DESCRIPTORS: PRESSURE

## 2024-10-15 ASSESSMENT — PAIN DESCRIPTION - LOCATION: LOCATION: HEAD

## 2024-10-15 NOTE — PROGRESS NOTES
Pt and family aware of  3 pm for transport to [facility] bed 436. Attempted to call report to [phone] no answer. Pt family packing belongings. Pt denies any pain. Call light in reach.

## 2024-10-15 NOTE — FLOWSHEET NOTE
10/15/24 0203   Vital Signs   Temp 98.8 °F (37.1 °C)   Temp Source Oral   Pulse 74   Heart Rate Source Monitor   Respirations 18   /63   MAP (Calculated) 83   BP Location Right upper arm   BP Method Automatic   Patient Position Semi fowlers   Pain Assessment   Pain Assessment None - Denies Pain   Opioid-Induced Sedation   POSS Score 1   Oxygen Therapy   SpO2 95 %   O2 Device None (Room air)     Pt VS as shown above.

## 2024-10-15 NOTE — PROGRESS NOTES
Pt c/o sinus pressure across bridge of nose and right forehead, 6/10. Medicated see MAR. Family at bedside. Call light in reach.

## 2024-10-15 NOTE — PROGRESS NOTES
AM assessment completed, see flow sheet. Pt is alert and oriented. Vital signs are WNL. Respirations are even & easy on RA. No complaints voiced. Pt denies needs at this time. SR up x 2, and bed in low position. Call light is within reach.

## 2024-10-15 NOTE — PROGRESS NOTES
Progress Note    Admit Date:  10/14/2024    Subjective:  Ms. Saunders lying in bed. Feeling good. No symptoms overnight. Reports on Friday she started to have a HR in the 40s consistently. Associated with some dizziness/lightheadedness. No change in medications, diet, or daily living activities.  HR NSR overnight.    Objective:   No data found.     No intake or output data in the 24 hours ending 10/15/24 0828    Physical Exam:   Gen: No distress. Alert.   Eyes: PERRL. No sclera icterus. No conjunctival injection.   ENT: No discharge. Pharynx clear.   Neck: No JVD.  No Carotid Bruit. Trachea midline.  Resp: No accessory muscle use. No crackles. No wheezes. No rhonchi.   CV: Regular rate. Regular rhythm. No murmur.  No rub. No edema.   Capillary Refill: Brisk,< 3 seconds   Peripheral Pulses: +2 palpable, equal bilaterally   GI: Non-tender. Non-distended. No masses. No organomegaly. Normal bowel sounds. No hernia.   Skin: Warm and dry. No nodule on exposed extremities. No rash on exposed extremities.   M/S: No cyanosis. No joint deformity. No clubbing.   Neuro: Awake. Grossly nonfocal    Psych: Oriented x 3. No anxiety or agitation.         Medications:  atorvastatin, 40 mg, Daily  aspirin, 81 mg, Daily  apixaban, 5 mg, BID  empagliflozin, 10 mg, Daily  Vitamin D, 1,000 Units, Daily  spironolactone, 50 mg, Daily  metFORMIN, 1,000 mg, BID WC  [Held by provider] glipiZIDE, 10 mg, BID AC  sodium chloride flush, 5-40 mL, 2 times per day  aspirin, 81 mg, Daily      PRN Medications:  glucose, 4 tablet, PRN  dextrose bolus, 125 mL, PRN   Or  dextrose bolus, 250 mL, PRN  glucagon (rDNA), 1 mg, PRN  dextrose, , Continuous PRN  sodium chloride flush, 5-40 mL, PRN  sodium chloride, , PRN  potassium chloride, 40 mEq, PRN   Or  potassium alternative oral replacement, 40 mEq, PRN   Or  potassium chloride, 10 mEq, PRN  magnesium sulfate, 2,000 mg, PRN  ondansetron, 4 mg, Q8H PRN   Or  ondansetron, 4 mg, Q6H PRN  acetaminophen, 650 mg,

## 2024-10-15 NOTE — PROGRESS NOTES
Patient admitted to room 216 from ER. Side rails up x2. Patient does have an order for telemetry. Bed is locked and in lowest position. Call light placed in patient reach. Patient explained the routine of the hospital, including but not limited to lab work, vital signs, hourly rounding, etc. Care plans and education updated, CHG wipes done at time of admission.     BP (!) 145/73   Pulse 80   Temp 97.8 °F (36.6 °C) (Oral)   Resp 20   Ht 1.626 m (5' 4\")   Wt 70.3 kg (154 lb 14.4 oz)   SpO2 95%   BMI 26.59 kg/m²     Most recent set of vitals as shown.     Patient has an LDA that does not require CHG wipes, including possible a surgery incision, cedeño catheter, or a central line.     4 Eyes Skin Assessment     The patient is being assess for   Admission    I agree that 2 RN's have performed a thorough Head to Toe Skin Assessment on the patient. ALL assessment sites listed below have been assessed.      Pt has scattered ecchymosis. Pt has no other skin issues.  Areas assessed for pressure by both nurses:   [x]   Head, Face, and Ears   [x]   Shoulders, Back, and Chest, Abdomen  [x]   Arms, Elbows, and Hands   [x]   Coccyx, Sacrum, and Ischium  [x]   Legs, Feet, and Heels        Skin Assessed Under all Medical Devices by both nurses:  NA               All Mepilex Borders were peeled back and area peeked at by both nurses:  No: NA  Please list where Mepilex Borders are located:  NA             **SHARE this note so that the co-signing nurse is able to place an eSignature**    Co-signer eSignature: Electronically signed by Edith Johnson RN on 10/15/24 at 4:58 AM EDT    Does the Patient have Skin Breakdown related to pressure?  No          Delfin Prevention initiated:  NA   Wound Care Orders initiated:  NA      [facility] nurse consulted for Pressure Injury (Stage 3,4, Unstageable, DTI, NWPT, Complex wounds)and New or Established Ostomies:  NA      Primary Nurse eSignature: Electronically signed by NACHO MCCULLOUGH RN on

## 2024-10-15 NOTE — PLAN OF CARE
Problem: Chronic Conditions and Co-morbidities  Goal: Patient's chronic conditions and co-morbidity symptoms are monitored and maintained or improved  10/15/2024 0947 by Jemma Beckham RN  Outcome: Progressing  10/15/2024 0006 by Radha Campos RN  Outcome: Progressing     Problem: Discharge Planning  Goal: Discharge to home or other facility with appropriate resources  10/15/2024 0947 by Jemma Beckham RN  Outcome: Progressing  10/15/2024 0006 by Radha Campos RN  Outcome: Progressing

## 2024-10-15 NOTE — CONSULTS
CARDIOLOGY CONSULTATION        Patient Name: Stephanie Saunders  Date of admission: 10/14/2024  6:05 PM  Admission Dx: Bradycardia [R00.1]  Requesting Physician: Sudhakar Washburn MD  Primary Care physician: Ramin Marroquin MD    Reason for Consultation/Chief Complaint: Bradycardia     History of Present Illness:     Stephanie Saunders is a very pleasant  79 y.o. patient with past medical history of HOCM nongenetic, nonfamilial, status post myectomy on 4/25/2023,  nonhypertensive, moderate MR in the presence of YEIMY, PAF, nonobstructive CAD, type 2 diabetes, dyslipidemia, who presented to the hospital with complaints of bradycardia and near syncope.  Patient states on Friday she was sitting at her table playing games with her family when she \"did not feel right.\"  She states she checked her blood pressure which was within normal limits but her pulse rate was in the 40s.  She states this lasted for about an hour or so and then she felt well until Sunday.  Patient states she began feeling \"foggy\" and again checked her blood pressure which was fine but her heart rate was in the 40s.  This occurred a second time on Sunday.  However became more persistent this week.  These episodes have been intermittent and at times when she is feeling well she checks her pulse and it is in the 70s and 90s.  She states she contacted her PCP who recommended come to the emergency department.    The patient denies chest pain, shortness of breath at rest and dyspnea with exertion.  Patient denies palpitations, lorenza syncope.  Denies PND, orthopnea, bendopnea, increasing lower extreme edema or weight gain.    Patient follows with [facility] cardiology in the hypertrophic cardiomyopathy clinic with Dr. Pelon Dyson       Past Medical History:   has a past medical history of CTS (carpal tunnel syndrome), DM (diabetes mellitus) (HCC), Elevated LFT's, HTN, Hyperlipidemia, Lung mass, Peripheral neuropathy, and Protein in urine.    Surgical  tomorrow  -Continue spironolactone  -repeat Echocardiogram  -N.p.o. at midnight       Patient Active Problem List   Diagnosis    DM (diabetes mellitus)    Protein in urine    CTS (carpal tunnel syndrome)    Lung mass    Vitamin D deficiency    Elevated TSH    Osteopenia    Type 2 diabetes mellitus with diabetic polyneuropathy, without long-term current use of insulin ([facility])    Essential hypertension    Hypercholesteremia    Cervical spine disease    Gastroesophageal reflux disease without esophagitis    Lower extremity edema    Arthritis    Vitamin B12 deficiency    Idiopathic progressive neuropathy    LVH (left ventricular hypertrophy)    Atrial fibrillation with RVR ([facility])    Shortness of breath    NSTEMI (non-ST elevated myocardial infarction) ([facility])    Nonrheumatic mitral valve regurgitation    HOCM (hypertrophic obstructive cardiomyopathy) ([facility])    H/O myomectomy    Bradycardia         I will address the patient's cardiac risk factors and adjusted pharmacologic treatment as needed. In addition, I have reinforced the need for patient directed risk factor modification.  All questions and concerns were addressed to the patient/family. Alternatives to my treatment were discussed.     Thank you for allowing us to participate in the care of Stephanie Saunders. Please call me with any questions [phone].    Chau Payne, DO   Cardiovascular Disease  [facility]  [phone] [place] Office  [phone] [place] Office  10/15/2024 11:07 AM

## 2024-10-15 NOTE — CONSULTS
Cardiac Electrophysiology Consult Note      Physician requesting consult: Ra Delgado MD   Reason for Consult: AV block, bradycardia  Admission Date: 10/15/2024  Room/Bed:  [facility]6/0436-01    Assessment:     1. Bradycardia/AV block: patient has been having symptomatic bradycardia for past few days. Admission ECG at [facility] showed complete heart block with overall bradycardia. This is in absence of any AV maurizio blockers (stopped many months ago). She has not had syncope or near-syncope. Would attach patient to telemetry and monitor closely.     Given lack of any reversible causes for her AV block and multiple indications for beta-blocker therapy, she will need a pacemaker device implant.    I had a discussion with the patient about pacemaker implantation. We discussed the risks, benefits and alternatives of this option. We discussed the possible associated risks including, but not limited to, the risks of infection, bleeding, vascular injury, injury to cardiac or surrounding structures including pneumothorax, lead or device malfunction or dislodgement, radiation exposure, injury to cardiac structures, stroke, and the small risk to life. The patient considered the proposed treatment options and decided to proceed with the device implantation. Will work to schedule the procedure.    Meanwhile, monitor on telemetry and avoid any AV maurizio blockers. If significant pauses are noted, then initiation of isopreterenol 1 mcg/minute is recommended (dopamine another alterative).     2. Hypertrophic obstructive cardiomyopathy: status post surgical myectomy in 2023 and has done very well since that time. No significant exertional symptoms. Satisfactory results on echocardiogram.     3. paroxysmal atrial fibrillation: ECG's from this admission demonstrate sinus rhythm. Patient without any recent issues with palpitations (states she has done well since surgery for her HOCM). On oral anticoagulation without bleeding issues.  regurgitation.    Patient Status: Routine    Height: 62 inches Weight: 163 pounds BSA: 1.75 m2 BMI: 29.81 kg/m2    BP: 115/61 mmHg     Conclusions      Summary   Left ventricle size is normal with severe concentric left ventricular   hypertrophy.   Left ventricular systolic function is hyperdynamic with ejection fraction   estimated at greater than 65%.   No regional wall motion abnormalities are noted.   Grade II diastolic dysfunction with elevated filling pressure.   There is a late peaking LV outflow tract gradient of 135 mmHg consistent   with resting LVOT obstruction. Gradient increases to 160 mmHg with valsalva   maneuver.   The right ventricle is normal in size and function.   The left atrium is severely dilated.   There is systolic anterior motion of the mitral valve with consequent severe   eccentric mitral regurgitation.   Mild tricuspid regurgitation.   Normal systolic pulmonary artery pressure (SPAP) estimated at 37 mmHg (RA   pressure 3 mmHg).      Compared with the prior study performed 4-8-2011 (EF65%, LVH, DD1, YEIMY, mod   MR, LVOT obstruction mean PG 47 mmHg), the LVEF remains hyperdynamic,   resting LVOT obstruction has worsened, with the degree of mitral   regurgitation now noted severe.      Signature      ------------------------------------------------------------------   Electronically signed by Luis Antonio Middleton MD (Interpreting   physician) on 11/10/2022 at 05:32 PM   ------------------------------------------------------------------      Findings      Left Ventricle   Left ventricle size is normal with severe concentric left ventricular   hypertrophy.   Left ventricular systolic function is hyperdynamic with ejection fraction   estimated at greater than 65%.   No regional wall motion abnormalities are noted.   Grade II diastolic dysfunction with elevated filling pressure.      Mitral Valve   Mild posterior mitral annular calcification is present.   There is systolic anterior motion of the mitral

## 2024-10-15 NOTE — H&P
[facility] Medicine History & Physical      Patient Name: Stephanie Saunders    : 1945    PCP: Ramin Marorquin MD    Date of Service:  Patient seen and examined on 10/14/24     Chief Complaint: Bradycardia    History Of Present Illness:    Stephanie Saunders is a 79 y.o. female with a PMH of hypertension hyperlipidemia type 2 diabetes who presented to ED with complaint of bradycardia    Patient presents to the ED with complaints of bradycardia.  She endorses that her heart rate has been in the 40s with associated low blood pressure and feeling of haziness.  No dizziness or loss of consciousness.  Had an EKG done and was informed to present to the ED for further management.    Blood pressure 140/59 pulse of 69 temperature 98.3 respiration 18 saturating 100%  BMP stable glucose 153 proBNP , troponin 20/17 LFT stable.  CBC stable  Chest x-ray shows a known 5.2 cm mass in the left lung base, mild pulmonary vascular congestion  EKG shows sinus bradycardia with atrial rate of 48.  Patient is being admitted for cardiology consult     Past Medical History:    Patient has a past medical history of CTS (carpal tunnel syndrome), DM (diabetes mellitus) (HCC), Elevated LFT's, HTN, Hyperlipidemia, Lung mass, Peripheral neuropathy, and Protein in urine.    Past Surgical History:    Patient has a past surgical history that includes Total abdominal hysterectomy w/ bilateral salpingoophorectomy; Lumbar disc surgery; and Hysterectomy.    Medications Prior to Admission:      Prior to Admission medications    Medication Sig Start Date End Date Taking? Authorizing Provider   metFORMIN (GLUCOPHAGE) 1000 MG tablet TAKE 1 TABLET BY MOUTH TWICE  DAILY WITH MEALS 24  Yes Ramin Marroquin MD   ELIQUIS 5 MG TABS tablet TAKE 1 TABLET BY MOUTH TWICE  DAILY 24  Yes Ramin Marroquin MD   atorvastatin (LIPITOR) 40 MG tablet TAKE 1 TABLET BY MOUTH EVERY DAY 7/15/24  Yes Ramin Marroquin MD   glipiZIDE (GLUCOTROL) 10

## 2024-10-15 NOTE — DISCHARGE SUMMARY
Name:  Stephanie Saunders  Room:  0216/0216-01  MRN:    [id number]    Discharge Summary      This discharge summary is in conjunction with a complete physical exam done on the day of discharge.      Discharging Physician: Dr. Hernandez      Admit: 10/14/2024  Discharge:  10/15/2024    Diagnoses this Admission    Principal Problem:    Bradycardia  Active Problems:    Cardiomyopathy (HCC)    Complete heart block (HCC)  Resolved Problems:    * No resolved hospital problems. *      Procedures (Please Review Full Report for Details)  none    Consults    IP CONSULT TO CARDIOLOGY      HPI:    Stephanie Saunders is a 79 y.o. female with a PMH of hypertension hyperlipidemia type 2 diabetes who presented to ED with complaint of bradycardia     Patient presents to the ED with complaints of bradycardia.  She endorses that her heart rate has been in the 40s with associated low blood pressure and feeling of haziness.  No dizziness or loss of consciousness.  Had an EKG done and was informed to present to the ED for further management.     Blood pressure 140/59 pulse of 69 temperature 98.3 respiration 18 saturating 100%  BMP stable glucose 153 proBNP 2060, troponin 20/17 LFT stable.  CBC stable  Chest x-ray shows a known 5.2 cm mass in the left lung base, mild pulmonary vascular congestion  EKG shows sinus bradycardia with atrial rate of 48.  Patient is being admitted for cardiology consult     Physical Exam at Discharge:   BP (!) 106/56   Pulse 80   Temp 98.4 °F (36.9 °C) (Oral)   Resp 16   Ht 1.626 m (5' 4\")   Wt 70.3 kg (154 lb 14.4 oz)   SpO2 93%   BMI 26.59 kg/m²   Gen: No distress. Alert.   Eyes: PERRL. No sclera icterus. No conjunctival injection.   ENT: No discharge. Pharynx clear.   Neck: No JVD.  No Carotid Bruit. Trachea midline.  Resp: No accessory muscle use. No crackles. No wheezes. No rhonchi.   CV: Regular rate. Regular rhythm. No murmur.  No rub. No edema.   Capillary Refill: Brisk,< 3 seconds   Peripheral

## 2024-10-16 ENCOUNTER — CARE COORDINATION (OUTPATIENT)
Dept: CASE MANAGEMENT | Age: 79
End: 2024-10-16

## 2024-10-16 LAB
GLUCOSE BLD-MCNC: 176 MG/DL (ref 70–99)
GLUCOSE BLD-MCNC: 187 MG/DL (ref 70–99)
GLUCOSE BLD-MCNC: 201 MG/DL (ref 70–99)
GLUCOSE BLD-MCNC: 372 MG/DL (ref 70–99)
PERFORMED ON: ABNORMAL

## 2024-10-16 PROCEDURE — 6370000000 HC RX 637 (ALT 250 FOR IP)

## 2024-10-16 PROCEDURE — 6370000000 HC RX 637 (ALT 250 FOR IP): Performed by: INTERNAL MEDICINE

## 2024-10-16 PROCEDURE — 99232 SBSQ HOSP IP/OBS MODERATE 35: CPT

## 2024-10-16 PROCEDURE — 2060000000 HC ICU INTERMEDIATE R&B

## 2024-10-16 RX ORDER — SODIUM CHLORIDE 0.9 % (FLUSH) 0.9 %
5-40 SYRINGE (ML) INJECTION PRN
Status: DISCONTINUED | OUTPATIENT
Start: 2024-10-16 | End: 2024-10-18 | Stop reason: HOSPADM

## 2024-10-16 RX ORDER — ASPIRIN 81 MG/1
81 TABLET, CHEWABLE ORAL DAILY
Status: DISCONTINUED | OUTPATIENT
Start: 2024-10-16 | End: 2024-10-18 | Stop reason: HOSPADM

## 2024-10-16 RX ORDER — SPIRONOLACTONE 25 MG/1
50 TABLET ORAL DAILY
Status: DISCONTINUED | OUTPATIENT
Start: 2024-10-16 | End: 2024-10-18 | Stop reason: HOSPADM

## 2024-10-16 RX ORDER — VITAMIN B COMPLEX
1000 TABLET ORAL DAILY
Status: DISCONTINUED | OUTPATIENT
Start: 2024-10-16 | End: 2024-10-18 | Stop reason: HOSPADM

## 2024-10-16 RX ORDER — POLYETHYLENE GLYCOL 3350 17 G/17G
17 POWDER, FOR SOLUTION ORAL DAILY PRN
Status: DISCONTINUED | OUTPATIENT
Start: 2024-10-16 | End: 2024-10-18 | Stop reason: HOSPADM

## 2024-10-16 RX ORDER — ONDANSETRON 4 MG/1
4 TABLET, ORALLY DISINTEGRATING ORAL EVERY 8 HOURS PRN
Status: DISCONTINUED | OUTPATIENT
Start: 2024-10-16 | End: 2024-10-18 | Stop reason: HOSPADM

## 2024-10-16 RX ORDER — SODIUM CHLORIDE 0.9 % (FLUSH) 0.9 %
5-40 SYRINGE (ML) INJECTION EVERY 12 HOURS SCHEDULED
Status: DISCONTINUED | OUTPATIENT
Start: 2024-10-16 | End: 2024-10-18 | Stop reason: HOSPADM

## 2024-10-16 RX ORDER — ATORVASTATIN CALCIUM 40 MG/1
40 TABLET, FILM COATED ORAL DAILY
Status: DISCONTINUED | OUTPATIENT
Start: 2024-10-16 | End: 2024-10-18 | Stop reason: HOSPADM

## 2024-10-16 RX ORDER — ACETAMINOPHEN 325 MG/1
650 TABLET ORAL EVERY 6 HOURS PRN
Status: DISCONTINUED | OUTPATIENT
Start: 2024-10-16 | End: 2024-10-18 | Stop reason: HOSPADM

## 2024-10-16 RX ORDER — POTASSIUM CHLORIDE 1500 MG/1
40 TABLET, EXTENDED RELEASE ORAL PRN
Status: DISCONTINUED | OUTPATIENT
Start: 2024-10-16 | End: 2024-10-18 | Stop reason: HOSPADM

## 2024-10-16 RX ORDER — MAGNESIUM SULFATE IN WATER 40 MG/ML
2000 INJECTION, SOLUTION INTRAVENOUS PRN
Status: DISCONTINUED | OUTPATIENT
Start: 2024-10-16 | End: 2024-10-18 | Stop reason: HOSPADM

## 2024-10-16 RX ORDER — POTASSIUM CHLORIDE 7.45 MG/ML
10 INJECTION INTRAVENOUS PRN
Status: DISCONTINUED | OUTPATIENT
Start: 2024-10-16 | End: 2024-10-18 | Stop reason: HOSPADM

## 2024-10-16 RX ORDER — ONDANSETRON 2 MG/ML
4 INJECTION INTRAMUSCULAR; INTRAVENOUS EVERY 6 HOURS PRN
Status: DISCONTINUED | OUTPATIENT
Start: 2024-10-16 | End: 2024-10-18 | Stop reason: HOSPADM

## 2024-10-16 RX ORDER — SODIUM CHLORIDE 9 MG/ML
INJECTION, SOLUTION INTRAVENOUS PRN
Status: DISCONTINUED | OUTPATIENT
Start: 2024-10-16 | End: 2024-10-18 | Stop reason: HOSPADM

## 2024-10-16 RX ORDER — ACETAMINOPHEN 650 MG/1
650 SUPPOSITORY RECTAL EVERY 6 HOURS PRN
Status: DISCONTINUED | OUTPATIENT
Start: 2024-10-16 | End: 2024-10-18 | Stop reason: HOSPADM

## 2024-10-16 RX ADMIN — APIXABAN 5 MG: 5 TABLET, FILM COATED ORAL at 10:10

## 2024-10-16 RX ADMIN — APIXABAN 5 MG: 5 TABLET, FILM COATED ORAL at 20:42

## 2024-10-16 RX ADMIN — INSULIN LISPRO 4 UNITS: 100 INJECTION, SOLUTION INTRAVENOUS; SUBCUTANEOUS at 20:41

## 2024-10-16 RX ADMIN — ATORVASTATIN CALCIUM 40 MG: 40 TABLET, FILM COATED ORAL at 20:42

## 2024-10-16 RX ADMIN — INSULIN LISPRO 1 UNITS: 100 INJECTION, SOLUTION INTRAVENOUS; SUBCUTANEOUS at 12:49

## 2024-10-16 RX ADMIN — Medication 1000 UNITS: at 10:10

## 2024-10-16 NOTE — PLAN OF CARE
Problem: Chronic Conditions and Co-morbidities  Goal: Patient's chronic conditions and co-morbidity symptoms are monitored and maintained or improved  10/16/2024 1132 by Mile Saunders RN  Outcome: Progressing  10/16/2024 0621 by Des Bunch RN  Outcome: Progressing     Problem: Discharge Planning  Goal: Discharge to home or other facility with appropriate resources  10/16/2024 1132 by Mile Saunders RN  Outcome: Progressing  10/16/2024 0621 by Des Bunch RN  Outcome: Progressing     Problem: ABCDS Injury Assessment  Goal: Absence of physical injury  10/16/2024 1132 by Mile Saunders RN  Outcome: Progressing  10/16/2024 0621 by Des Bunch RN  Outcome: Progressing     Problem: Safety - Adult  Goal: Free from fall injury  10/16/2024 1132 by Mile Saunders RN  Outcome: Progressing  10/16/2024 0621 by Des Bunch RN  Outcome: Progressing

## 2024-10-16 NOTE — CARE COORDINATION
Case Management Assessment  Initial Evaluation    Date/Time of Evaluation: 10/16/2024 9:45 AM  Assessment Completed by: Rocio Nobles RN    If patient is discharged prior to next notation, then this note serves as note for discharge by case management.    Patient Name: Stephanie Saunders                   YOB: 1945  Diagnosis: Complete heart block (HCC) [I44.2]                   Date / Time: 10/15/2024  3:47 PM    Patient Admission Status: Inpatient   Readmission Risk (Low < 19, Mod (19-27), High > 27): Readmission Risk Score: 8.5    Current PCP: Ramin Marroquin MD  PCP verified by CM? Yes    Chart Reviewed: Yes      History Provided by: Patient  Patient Orientation: Alert and Oriented    Patient Cognition: Alert    Hospitalization in the last 30 days (Readmission):  No    If yes, Readmission Assessment in CM Navigator will be completed.    Advance Directives:      Code Status: Full Code   Patient's Primary Decision Maker is: Patient Declined (Legal Next of Kin Remains as Decision Maker)    Primary Decision Maker: Fuad Saunders - Spouse - [phone]    Discharge Planning:    Patient lives with: Spouse/Significant Other Type of Home: House  Primary Care Giver: Self  Patient Support Systems include: Spouse/Significant Other   Current Financial resources: Medicare  Current community resources: None  Current services prior to admission: None            Current DME:              Type of Home Care services:  None    ADLS  Prior functional level: Independent in ADLs/IADLs  Current functional level: Independent in ADLs/IADLs    PT AM-PAC:   /24  OT AM-PAC:   /24    Family can provide assistance at DC: Yes  Would you like Case Management to discuss the discharge plan with any other family members/significant others, and if so, who? Yes (spouse)  Plans to Return to Present Housing: Yes  Other Identified Issues/Barriers to RETURNING to current housing: none  Potential Assistance needed at discharge: N/A

## 2024-10-16 NOTE — ACP (ADVANCE CARE PLANNING)
Advance Care Planning   Advance Care Planning Clinical Specialist  Conversation Note      Date of ACP Conversation: 10/16/2024    Conversation Conducted with:   Patient with Decision Making Capacity   Healthcare Decision Maker: Next of Kin by law (only applies in absence of above) as below    ACP Clinical Specialist: Rocio Nobles, RN      *When Decision Maker makes decisions on behalf of the incapacitated patient: Decision Maker is asked to consider and make decisions based on patient values, known preferences, or best interests.       Current Designated Health Care Decision Maker:   Primary Decision Maker: Fuad Saunders - Spouse - [phone]  (as entered in ACP Activity Healthcare Decision Maker field.  Validate  this information as still accurate & up-to-date; edit Healthcare Decision Maker field as needed.)         Hospitalization  If your health were to worsen and it became clear that your chance of recovery was unlikely, what would your preferences be regarding hospitalization?:    Choice:  [x]  The patient would want hospitalization  []  The patient would prefer comfort-focused treatment without hospitalization.    Ventilation  If you were in your present state of health and suddenly became very ill and were unable to breathe on your own, what would your preference be about the use of a ventilator (breathing machine) if it were available to you?      If patient would desire the use of a ventilator (breathing machine), answer \"yes\", if not \"no\":yes    If your health were to worsen and it became clear that your chance of recovery was unlikely, would that change your answer? Yes    Resuscitation  CPR works best to restart the heart when there is a sudden event, like a heart attack, in someone who is otherwise healthy. Unfortunately, CPR does not typically restart the heart for people who have serious health conditions or who are very sick.    In the event your heart stopped, would you want attempts to

## 2024-10-16 NOTE — PROGRESS NOTES
[facility]     Electrophysiology                                     Progress Note    Admission date:  10/15/2024    Reason for follow up visit: Bradycardia/AV block    HPI/CC: Stephanie Saunders was admitted on 10/15/2024 with lightheadedness.  Patient was noted to have heart rate in the 40s at home on her blood pressure machine. EP consulted.  Plan for DC PPM on 10/17/2024    Rhythm has been SR.     Subjective: Patient resting in bed.  Denies chest pain shortness of breath and palpitations.  Reviewed pacemaker implantation including post care instructions.  Patient verbalizes understanding    Vitals:  Blood pressure 105/63, pulse 79, temperature 98 °F (36.7 °C), temperature source Oral, resp. rate 16, height 1.626 m (5' 4\"), weight 68.1 kg (150 lb 1.6 oz), SpO2 95%.  Temp  Av.3 °F (36.8 °C)  Min: 97.7 °F (36.5 °C)  Max: 98.8 °F (37.1 °C)  Pulse  Av.2  Min: 79  Max: 85  BP  Min: 101/59  Max: 147/89  SpO2  Av.6 %  Min: 93 %  Max: 96 %    24 hour I/O    Intake/Output Summary (Last 24 hours) at 10/16/2024 1218  Last data filed at 10/16/2024 0030  Gross per 24 hour   Intake 240 ml   Output --   Net 240 ml     Current Facility-Administered Medications   Medication Dose Route Frequency Provider Last Rate Last Admin    apixaban (ELIQUIS) tablet 5 mg  5 mg Oral BID Mirta Frye DO   5 mg at 10/16/24 1010    aspirin chewable tablet 81 mg  81 mg Oral Daily Mirta Frye DO        atorvastatin (LIPITOR) tablet 40 mg  40 mg Oral Daily Mirta Frye DO        [Held by provider] spironolactone (ALDACTONE) tablet 50 mg  50 mg Oral Daily Mirta Frye DO        Vitamin D (CHOLECALCIFEROL) tablet 1,000 Units  1,000 Units Oral Daily Mirta Frye DO   1,000 Units at 10/16/24 1010    sodium chloride flush 0.9 % injection 5-40 mL  5-40 mL IntraVENous 2 times per day Mirta Frye DO        sodium chloride flush 0.9 % injection 5-40 mL  5-40 mL IntraVENous PRN Mirta Frye, DO     breath sounds without wheezing, rhonchi, and rales  Cardiovascular:  The apical impulse is not displaced  Heart tones are crisp and normal. regular S1 and S2.  Jugular venous pulsation Normal  The carotid upstroke is normal in amplitude and contour without delay or bruit  Peripheral pulses are symmetrical and full   Abdomen:  No masses or tenderness  Bowel sounds present  Extremities:   No cyanosis or clubbing   No lower extremity edema   Skin: warm and dry  Neurological:  Alert and oriented  Moves all extremities well  No abnormalities of mood, affect, memory, mentation, or behavior are noted    Data  ECG 10/14/2024  2-1 AV block    Echo 10/15/2024     Image quality is adequate.    Left Ventricle: Normal left ventricular systolic function with a visually estimated EF of 65 - 70%. EF by 2D Simpsons Biplane is 70%. Left ventricle size is normal. Moderate to severely increased wall thickness. Findings consistent with concentric hypertrophy.  Maximal septal thickness near the mid septum is 1.86 cm in diastole. Septal motion is consistent with bundle branch block. Normal and hyperdynamic wall motion. Grade II diastolic dysfunction with increased LAP. No LVOT obstruction at rest.    Right Ventricle: Right ventricle size is normal. Normal systolic function.    Left Atrium: Left atrium appears visually dilated.    Aortic Valve: Trileaflet valve. Mild sclerosis of the aortic valve, noncoronary cusp. No stenosis.    Mitral Valve: Mild regurgitation.    Tricuspid Valve: Mild regurgitation. Normal RVSP. Est RA pressure is 3 mmHg. The estimated RVSP is 32 mmHg.    IVC/SVC: IVC diameter is less than or equal to 21 mm and decreases greater than 50% during inspiration; therefore the estimated right atrial pressure is normal (~3 mmHg). IVC size is normal.    All labs and testing reviewed.  Lab Review     Renal Profile:   Lab Results   Component Value Date/Time    CREATININE 1.1 10/15/2024 06:12 AM    BUN 18 10/15/2024 06:12 AM

## 2024-10-16 NOTE — CARE COORDINATION
This writer spoke with patient regarding RPM. She declines. She was admitted with bradycardia and is willing to wear Holter monitor at dc if recommended by cardiology.      Rocio Nobles RN

## 2024-10-16 NOTE — CARE COORDINATION
Spoke to IP CHANDANA Will and informed patient is RPM eligible dx DM (not eligible for HF HTN d/t non merc cardio)  Nina Montgomery, RN   [phone]  Sally Cameron, RN  Phone [phone]

## 2024-10-16 NOTE — H&P
V2.0  History and Physical      Name:  Stephanie Saunders /Age/Sex: 1945  (79 y.o. female)   MRN & CSN:  [id number] & [id number] Encounter Date/Time: 10/16/2024 8:53 AM EDT   Location:  [facility]/0436-01 PCP: Ramin Marroquin MD       Hospital Day: 2    Assessment and Plan:   Stephanie Saunders is a 79 y.o. female with a pmh of of CTS (carpal tunnel syndrome), DM (diabetes mellitus) (HCC), Elevated LFT's, HTN, Hyperlipidemia, Lung mass, Peripheral neuropathy, HOCM, and Protein in urine who presents with Complete heart block (HCC)    Hospital Problems             Last Modified POA    * (Principal) Complete heart block (HCC) 10/15/2024 Yes    Arrhythmia 10/15/2024 Unknown     Interval History: Patient laying in bed this morning. Denies any CP, SOB, palpitations, abdominal pain. Reports cough and congestion she attributes to her sinuses. Patient has a history of HOCM s/p myectomy in . Pacemaker Placement per cardiology 10/17.     Plan:    Bradycardia  Complete Heart Block:   -EKG showing sinus bradycardia with complete heart block  -Cardiology following  -Telemetry monitoring   -Pacemaker Placement 10/17    Hypertrophic obstructive cardiomyopathy  -nongenetic, nonfamilial   -S/P surgical myectomy on 2023   -No significant exertional symptoms     Paroxysmal atrial fibrillation   -Continue home anticoagulation with Eliquis.     Hypertension   -Well controlled on home regimen  -Hold spironolactone for low pressures, can resume is pressures elevate     Diabetes mellitusType 2  -SSI ordered  -Hypoglycemia protocol   -POC Glucose, carb control diet  -Hold Jardiance for procedure    -Continue to monitor     Hyperlipidemia   -Continue Statin     Hx of Lung mass   -Known 5.2 cm mass at the left lung base  -23 Stable lingular mass which was favored to be a hamartoma on chest CT  -44.2 pack-year smoking history  -Followed outpatient     Disposition:   Current Living situation: Home  Expected Disposition: 2-3 days  Mild regurgitation.   Tricuspid Valve: Mild regurgitation. Normal RVSP. Est RA pressure is 3 mmHg. The estimated RVSP is 32 mmHg.   IVC/SVC: IVC diameter is less than or equal to 21 mm and decreases greater than 50% during inspiration; therefore the estimated right atrial pressure is normal (~3 mmHg). IVC size is normal.     XR CHEST PORTABLE    Result Date: 10/14/2024  EXAMINATION: ONE XRAY VIEW OF THE CHEST 10/14/2024 6:35 pm COMPARISON: August 25, 2023 HISTORY: ORDERING SYSTEM PROVIDED HISTORY: bradycardia TECHNOLOGIST PROVIDED HISTORY: Reason for exam:->bradycardia Reason for Exam: cardiac workup FINDINGS: The patient is status post median sternotomy.  The cardiomediastinal silhouette is stable.  There is known 5.2 cm mass at the left lung base. There is mild pulmonary vascular congestion.  There is no pleural effusion. There is no pneumothorax.  There is no acute osseous abnormality.     Known 5.2 cm mass at the left lung base. Mild pulmonary vascular congestion.     Electronically signed by Mirta Frye DO on 10/16/2024 at 8:53 AM    Mirta Frye DO, PGY-1  [facility]

## 2024-10-17 ENCOUNTER — NURSE ONLY (OUTPATIENT)
Dept: CARDIOLOGY CLINIC | Age: 79
End: 2024-10-17

## 2024-10-17 ENCOUNTER — ANESTHESIA EVENT (OUTPATIENT)
Dept: CARDIAC CATH/INVASIVE PROCEDURES | Age: 79
End: 2024-10-17
Payer: MEDICARE

## 2024-10-17 ENCOUNTER — ANESTHESIA (OUTPATIENT)
Dept: CARDIAC CATH/INVASIVE PROCEDURES | Age: 79
End: 2024-10-17
Payer: MEDICARE

## 2024-10-17 ENCOUNTER — APPOINTMENT (OUTPATIENT)
Dept: GENERAL RADIOLOGY | Age: 79
End: 2024-10-17
Attending: INTERNAL MEDICINE
Payer: MEDICARE

## 2024-10-17 DIAGNOSIS — R00.1 BRADYCARDIA: ICD-10-CM

## 2024-10-17 DIAGNOSIS — Z95.0 PACEMAKER: Primary | ICD-10-CM

## 2024-10-17 PROBLEM — I48.0 PAF (PAROXYSMAL ATRIAL FIBRILLATION) (HCC): Status: ACTIVE | Noted: 2024-10-17

## 2024-10-17 LAB
ANION GAP SERPL CALCULATED.3IONS-SCNC: 13 MMOL/L (ref 3–16)
BASOPHILS # BLD: 0 K/UL (ref 0–0.2)
BASOPHILS NFR BLD: 0.4 %
BUN SERPL-MCNC: 19 MG/DL (ref 7–20)
CALCIUM SERPL-MCNC: 9.2 MG/DL (ref 8.3–10.6)
CHLORIDE SERPL-SCNC: 99 MMOL/L (ref 99–110)
CO2 SERPL-SCNC: 21 MMOL/L (ref 21–32)
CREAT SERPL-MCNC: 1.1 MG/DL (ref 0.6–1.2)
DEPRECATED RDW RBC AUTO: 13.1 % (ref 12.4–15.4)
ECHO BSA: 1.78 M2
EKG ATRIAL RATE: 72 BPM
EKG DIAGNOSIS: NORMAL
EKG P AXIS: 83 DEGREES
EKG P-R INTERVAL: 194 MS
EKG Q-T INTERVAL: 414 MS
EKG QRS DURATION: 124 MS
EKG QTC CALCULATION (BAZETT): 453 MS
EKG R AXIS: -39 DEGREES
EKG T AXIS: 137 DEGREES
EKG VENTRICULAR RATE: 72 BPM
EOSINOPHIL # BLD: 0.1 K/UL (ref 0–0.6)
EOSINOPHIL NFR BLD: 1.3 %
GFR SERPLBLD CREATININE-BSD FMLA CKD-EPI: 51 ML/MIN/{1.73_M2}
GLUCOSE BLD-MCNC: 214 MG/DL (ref 70–99)
GLUCOSE BLD-MCNC: 231 MG/DL (ref 70–99)
GLUCOSE BLD-MCNC: 300 MG/DL (ref 70–99)
GLUCOSE SERPL-MCNC: 219 MG/DL (ref 70–99)
HCT VFR BLD AUTO: 45.1 % (ref 36–48)
HGB BLD-MCNC: 14.8 G/DL (ref 12–16)
LYMPHOCYTES # BLD: 1.8 K/UL (ref 1–5.1)
LYMPHOCYTES NFR BLD: 25 %
MCH RBC QN AUTO: 31.2 PG (ref 26–34)
MCHC RBC AUTO-ENTMCNC: 32.9 G/DL (ref 31–36)
MCV RBC AUTO: 94.8 FL (ref 80–100)
MONOCYTES # BLD: 1.1 K/UL (ref 0–1.3)
MONOCYTES NFR BLD: 15.6 %
NEUTROPHILS # BLD: 4 K/UL (ref 1.7–7.7)
NEUTROPHILS NFR BLD: 57.7 %
PERFORMED ON: ABNORMAL
PLATELET # BLD AUTO: 212 K/UL (ref 135–450)
PMV BLD AUTO: 8.5 FL (ref 5–10.5)
POTASSIUM SERPL-SCNC: 3.9 MMOL/L (ref 3.5–5.1)
RBC # BLD AUTO: 4.76 M/UL (ref 4–5.2)
SODIUM SERPL-SCNC: 133 MMOL/L (ref 136–145)
WBC # BLD AUTO: 7 K/UL (ref 4–11)

## 2024-10-17 PROCEDURE — 7100000011 HC PHASE II RECOVERY - ADDTL 15 MIN: Performed by: INTERNAL MEDICINE

## 2024-10-17 PROCEDURE — 02H63JZ INSERTION OF PACEMAKER LEAD INTO RIGHT ATRIUM, PERCUTANEOUS APPROACH: ICD-10-PCS | Performed by: INTERNAL MEDICINE

## 2024-10-17 PROCEDURE — C1892 INTRO/SHEATH,FIXED,PEEL-AWAY: HCPCS | Performed by: INTERNAL MEDICINE

## 2024-10-17 PROCEDURE — 93005 ELECTROCARDIOGRAM TRACING: CPT | Performed by: INTERNAL MEDICINE

## 2024-10-17 PROCEDURE — 2580000003 HC RX 258: Performed by: NURSE ANESTHETIST, CERTIFIED REGISTERED

## 2024-10-17 PROCEDURE — 36415 COLL VENOUS BLD VENIPUNCTURE: CPT

## 2024-10-17 PROCEDURE — 85025 COMPLETE CBC W/AUTO DIFF WBC: CPT

## 2024-10-17 PROCEDURE — 71045 X-RAY EXAM CHEST 1 VIEW: CPT

## 2024-10-17 PROCEDURE — 33208 INSRT HEART PM ATRIAL & VENT: CPT | Performed by: INTERNAL MEDICINE

## 2024-10-17 PROCEDURE — 0JH606Z INSERTION OF PACEMAKER, DUAL CHAMBER INTO CHEST SUBCUTANEOUS TISSUE AND FASCIA, OPEN APPROACH: ICD-10-PCS | Performed by: INTERNAL MEDICINE

## 2024-10-17 PROCEDURE — 7100000010 HC PHASE II RECOVERY - FIRST 15 MIN: Performed by: INTERNAL MEDICINE

## 2024-10-17 PROCEDURE — 6370000000 HC RX 637 (ALT 250 FOR IP): Performed by: INTERNAL MEDICINE

## 2024-10-17 PROCEDURE — C1769 GUIDE WIRE: HCPCS | Performed by: INTERNAL MEDICINE

## 2024-10-17 PROCEDURE — 02HK3JZ INSERTION OF PACEMAKER LEAD INTO RIGHT VENTRICLE, PERCUTANEOUS APPROACH: ICD-10-PCS | Performed by: INTERNAL MEDICINE

## 2024-10-17 PROCEDURE — 6370000000 HC RX 637 (ALT 250 FOR IP)

## 2024-10-17 PROCEDURE — C1898 LEAD, PMKR, OTHER THAN TRANS: HCPCS | Performed by: INTERNAL MEDICINE

## 2024-10-17 PROCEDURE — 93010 ELECTROCARDIOGRAM REPORT: CPT | Performed by: INTERNAL MEDICINE

## 2024-10-17 PROCEDURE — C1785 PMKR, DUAL, RATE-RESP: HCPCS | Performed by: INTERNAL MEDICINE

## 2024-10-17 PROCEDURE — 2580000003 HC RX 258: Performed by: INTERNAL MEDICINE

## 2024-10-17 PROCEDURE — 2500000003 HC RX 250 WO HCPCS: Performed by: INTERNAL MEDICINE

## 2024-10-17 PROCEDURE — C1894 INTRO/SHEATH, NON-LASER: HCPCS | Performed by: INTERNAL MEDICINE

## 2024-10-17 PROCEDURE — 3700000001 HC ADD 15 MINUTES (ANESTHESIA): Performed by: INTERNAL MEDICINE

## 2024-10-17 PROCEDURE — 6360000002 HC RX W HCPCS: Performed by: NURSE ANESTHETIST, CERTIFIED REGISTERED

## 2024-10-17 PROCEDURE — 2060000000 HC ICU INTERMEDIATE R&B

## 2024-10-17 PROCEDURE — 3700000000 HC ANESTHESIA ATTENDED CARE: Performed by: INTERNAL MEDICINE

## 2024-10-17 PROCEDURE — 80048 BASIC METABOLIC PNL TOTAL CA: CPT

## 2024-10-17 PROCEDURE — 6360000004 HC RX CONTRAST MEDICATION: Performed by: INTERNAL MEDICINE

## 2024-10-17 PROCEDURE — 6360000002 HC RX W HCPCS: Performed by: INTERNAL MEDICINE

## 2024-10-17 PROCEDURE — 2500000003 HC RX 250 WO HCPCS: Performed by: NURSE ANESTHETIST, CERTIFIED REGISTERED

## 2024-10-17 PROCEDURE — 2709999900 HC NON-CHARGEABLE SUPPLY: Performed by: INTERNAL MEDICINE

## 2024-10-17 PROCEDURE — 99232 SBSQ HOSP IP/OBS MODERATE 35: CPT

## 2024-10-17 DEVICE — IPG W1DR01 AZURE XT DR MRI USA
Type: IMPLANTABLE DEVICE | Site: CHEST | Status: FUNCTIONAL
Brand: AZURE™ XT DR MRI SURESCAN™

## 2024-10-17 DEVICE — LEAD 3830 US MKT/ 69CM MRI LBBAP
Type: IMPLANTABLE DEVICE | Site: HEART | Status: FUNCTIONAL
Brand: SELECTSECURE™ MRI SURESCAN™

## 2024-10-17 DEVICE — LEAD 457445 MRI US BI RCMCRD MVC
Type: IMPLANTABLE DEVICE | Site: HEART | Status: FUNCTIONAL
Brand: CAPSURE SENSE MRI™ SURESCAN™

## 2024-10-17 RX ORDER — LIDOCAINE HYDROCHLORIDE 20 MG/ML
INJECTION, SOLUTION INFILTRATION; PERINEURAL
Status: DISCONTINUED | OUTPATIENT
Start: 2024-10-17 | End: 2024-10-17 | Stop reason: SDUPTHER

## 2024-10-17 RX ORDER — PROPOFOL 10 MG/ML
INJECTION, EMULSION INTRAVENOUS
Status: DISCONTINUED | OUTPATIENT
Start: 2024-10-17 | End: 2024-10-17 | Stop reason: SDUPTHER

## 2024-10-17 RX ORDER — SODIUM CHLORIDE 9 MG/ML
INJECTION, SOLUTION INTRAVENOUS
Status: DISCONTINUED | OUTPATIENT
Start: 2024-10-17 | End: 2024-10-17 | Stop reason: SDUPTHER

## 2024-10-17 RX ORDER — SODIUM CHLORIDE 0.9 % (FLUSH) 0.9 %
5-40 SYRINGE (ML) INJECTION PRN
Status: DISCONTINUED | OUTPATIENT
Start: 2024-10-17 | End: 2024-10-18 | Stop reason: HOSPADM

## 2024-10-17 RX ORDER — LIDOCAINE HYDROCHLORIDE 20 MG/ML
INJECTION, SOLUTION EPIDURAL; INFILTRATION; INTRACAUDAL; PERINEURAL PRN
Status: DISCONTINUED | OUTPATIENT
Start: 2024-10-17 | End: 2024-10-17 | Stop reason: HOSPADM

## 2024-10-17 RX ORDER — BUPIVACAINE HYDROCHLORIDE 5 MG/ML
INJECTION, SOLUTION EPIDURAL; INTRACAUDAL PRN
Status: DISCONTINUED | OUTPATIENT
Start: 2024-10-17 | End: 2024-10-17 | Stop reason: HOSPADM

## 2024-10-17 RX ORDER — SODIUM CHLORIDE 0.9 % (FLUSH) 0.9 %
5-40 SYRINGE (ML) INJECTION EVERY 12 HOURS SCHEDULED
Status: DISCONTINUED | OUTPATIENT
Start: 2024-10-17 | End: 2024-10-18 | Stop reason: HOSPADM

## 2024-10-17 RX ORDER — SODIUM CHLORIDE 9 MG/ML
INJECTION, SOLUTION INTRAVENOUS PRN
Status: DISCONTINUED | OUTPATIENT
Start: 2024-10-17 | End: 2024-10-18 | Stop reason: HOSPADM

## 2024-10-17 RX ORDER — MIDAZOLAM HYDROCHLORIDE 1 MG/ML
INJECTION INTRAMUSCULAR; INTRAVENOUS
Status: DISCONTINUED | OUTPATIENT
Start: 2024-10-17 | End: 2024-10-17 | Stop reason: SDUPTHER

## 2024-10-17 RX ADMIN — INSULIN LISPRO 3 UNITS: 100 INJECTION, SOLUTION INTRAVENOUS; SUBCUTANEOUS at 17:30

## 2024-10-17 RX ADMIN — ACETAMINOPHEN 650 MG: 325 TABLET ORAL at 21:02

## 2024-10-17 RX ADMIN — APIXABAN 5 MG: 5 TABLET, FILM COATED ORAL at 19:10

## 2024-10-17 RX ADMIN — SODIUM CHLORIDE: 9 INJECTION, SOLUTION INTRAVENOUS at 10:03

## 2024-10-17 RX ADMIN — ATORVASTATIN CALCIUM 40 MG: 40 TABLET, FILM COATED ORAL at 19:10

## 2024-10-17 RX ADMIN — MIDAZOLAM 2 MG: 1 INJECTION INTRAMUSCULAR; INTRAVENOUS at 10:03

## 2024-10-17 RX ADMIN — PROPOFOL 50 MCG/KG/MIN: 10 INJECTION, EMULSION INTRAVENOUS at 10:15

## 2024-10-17 RX ADMIN — CEFAZOLIN 1000 MG: 1 INJECTION, POWDER, FOR SOLUTION INTRAMUSCULAR; INTRAVENOUS at 09:50

## 2024-10-17 RX ADMIN — LIDOCAINE HYDROCHLORIDE 60 MG: 20 INJECTION, SOLUTION INFILTRATION; PERINEURAL at 10:10

## 2024-10-17 ASSESSMENT — PAIN SCALES - GENERAL
PAINLEVEL_OUTOF10: 0
PAINLEVEL_OUTOF10: 0

## 2024-10-17 ASSESSMENT — ENCOUNTER SYMPTOMS: SHORTNESS OF BREATH: 1

## 2024-10-17 NOTE — PROGRESS NOTES
Hospital Medicine Progress Note      Date of Admission: 10/15/2024  Hospital Day: 3    Chief Admission Complaint: Complete heart block     Subjective: Patient status post pacemaker placement    Presenting Admission History:       Stephanie Saunders is a 79 y.o. female with a pmh of of CTS (carpal tunnel syndrome), DM (diabetes mellitus) (HCC), Elevated LFT's, HTN, Hyperlipidemia, Lung mass, Peripheral neuropathy, HOCM, and Protein in urine who presents with Complete heart block (HCC)     Assessment/Plan:      Current Principal Problem:  Complete heart block (HCC)    1.  Advanced heart block.  Patient with episodes of complete heart block.  Cardiology following.  Continue telemetry.  Will monitor electrolytes closely.  Avoid AV maurizio blockers.  Status post pacemaker.  Reviewed rhythm strips today.  Patient and paced rhythm at heart rate of 71.  2.  Hypertrophic obstructive cardiomyopathy.  Status post myomectomy in 2023.  No significant symptoms.  Monitor closely.  3.  Paroxysmal atrial fibrillation.  Patient on Eliquis.  Continue telemetry.  Patient with secondary hypercoagulable state due to atrial fibrillation.  4. Diabetes Type II. Closely monitor for insulin toxicity with serial glucose blood checks for hypoglycemia. Orders reviewed and patient requiring continued \"as needed\" coverage with insulin sliding scale for blood sugar control.        Physical Exam Performed:      General appearance:  No apparent distress  Respiratory:  Normal respiratory effort.   Cardiovascular:  Regular rate and rhythm.  Abdomen:  Soft, non-tender, non-distended.  Musculoskeletal:  No edema  Neurologic:  Non-focal  Psychiatric:  Alert and oriented    /72   Pulse 86   Temp 98.2 °F (36.8 °C) (Oral)   Resp 16   Ht 1.626 m (5' 4\")   Wt 68.1 kg (150 lb 1.6 oz)   SpO2 91%   BMI 25.76 kg/m²     Diet: ADULT DIET; Regular; Low Fat/Low Chol/High Fiber/2 gm Na  DVT Prophylaxis: []PPx LMWH  []SQ Heparin  []IPC/SCDs  [x]Eliquis   clinical significance.     Recent Labs     10/15/24  0612 10/17/24  0525   WBC 6.7 7.0   HGB 13.9 14.8   HCT 40.7 45.1    212     Recent Labs     10/15/24  0612 10/17/24  0526    133*   K 4.2 3.9    99   CO2 25 21   BUN 18 19   CREATININE 1.1 1.1   CALCIUM 9.7 9.2     Recent Labs     10/14/24  1930 10/14/24  2357 10/15/24  0112   TROPHS 17* 19* 20*     No results for input(s): \"LABA1C\" in the last 72 hours.  No results for input(s): \"AST\", \"ALT\", \"BILIDIR\", \"BILITOT\", \"ALKPHOS\" in the last 72 hours.  No results for input(s): \"INR\", \"LACTA\", \"TSH\" in the last 72 hours.    Urine Cultures:   Lab Results   Component Value Date/Time    LABURIN  03/01/2024 02:39 PM     <10,000 CFU/ml mixed skin/urogenital ngoc. No further workup    LABURIN 25,000 CFU/ml 03/01/2024 02:39 PM    LABURIN 50 11/20/2020 10:18 AM     Blood Cultures: No results found for: \"BC\"  No results found for: \"BLOODCULT2\"  Organism:   Lab Results   Component Value Date/Time    ORG Klebsiella pneumoniae 03/01/2024 02:39 PM         DIANE EMMANUEL MD

## 2024-10-17 NOTE — PROGRESS NOTES
[facility]     Electrophysiology                                     Progress Note    Admission date:  10/15/2024    Reason for follow up visit: Bradycardia/AV block    HPI/CC: Stephanie Saunders was admitted on 10/15/2024 with lightheadedness.  Patient was noted to have heart rate in the 40s at home on her blood pressure machine. EP consulted.  Plan for DC PPM on 10/17/2024    Rhythm has been SR.     Subjective: Patient resting in bed.  Denies chest pain shortness of breath and palpitations.  Reviewed pacemaker implantation including post care instructions.  Patient verbalizes understanding.  Patient has been n.p.o. since midnight    Vitals:  Blood pressure 118/76, pulse 75, temperature 98.4 °F (36.9 °C), temperature source Oral, resp. rate 18, height 1.626 m (5' 4\"), weight 68.1 kg (150 lb 1.6 oz), SpO2 94%.  Temp  Av.7 °F (37.1 °C)  Min: 97.8 °F (36.6 °C)  Max: 99.6 °F (37.6 °C)  Pulse  Av.2  Min: 75  Max: 86  BP  Min: 106/72  Max: 126/67  SpO2  Av.8 %  Min: 93 %  Max: 94 %    24 hour I/O    Intake/Output Summary (Last 24 hours) at 10/17/2024 1130  Last data filed at 10/17/2024 1109  Gross per 24 hour   Intake 240 ml   Output 10 ml   Net 230 ml     Current Facility-Administered Medications   Medication Dose Route Frequency Provider Last Rate Last Admin    apixaban (ELIQUIS) tablet 5 mg  5 mg Oral BID Uday Mirta K, DO   5 mg at 10/16/24 2042    aspirin chewable tablet 81 mg  81 mg Oral Daily Uday, Mirta K, DO        atorvastatin (LIPITOR) tablet 40 mg  40 mg Oral Daily Uday Mirta K, DO   40 mg at 10/16/24 2042    [Held by provider] spironolactone (ALDACTONE) tablet 50 mg  50 mg Oral Daily Uday, Mirta K, DO        Vitamin D (CHOLECALCIFEROL) tablet 1,000 Units  1,000 Units Oral Daily Uday Mirta K, DO   1,000 Units at 10/16/24 1010    sodium chloride flush 0.9 % injection 5-40 mL  5-40 mL IntraVENous 2 times per day Mirta Frye,         sodium chloride flush 0.9 %

## 2024-10-17 NOTE — PLAN OF CARE
Problem: Chronic Conditions and Co-morbidities  Goal: Patient's chronic conditions and co-morbidity symptoms are monitored and maintained or improved  10/17/2024 1941 by Gina Gregory, RN  Outcome: Progressing  10/17/2024 1526 by Mile Saunders, RN  Outcome: Progressing

## 2024-10-17 NOTE — ANESTHESIA PRE PROCEDURE
infusion   IntraVENous Continuous PRN Tobi Rg MD       • insulin lispro (HUMALOG,ADMELOG) injection vial 0-4 Units  0-4 Units SubCUTAneous 4x Daily AC & HS Tobi Rg MD   4 Units at 10/16/24 2041       Allergies:    Allergies   Allergen Reactions   • Actos [Pioglitazone Hydrochloride]    • Levaquin [Levofloxacin]        Problem List:    Patient Active Problem List   Diagnosis Code   • DM (diabetes mellitus) E11.9   • Protein in urine R80.9   • CTS (carpal tunnel syndrome) G56.00   • Lung mass R91.8   • Vitamin D deficiency E55.9   • Elevated TSH R79.89   • Osteopenia M85.80   • Type 2 diabetes mellitus with diabetic polyneuropathy, without long-term current use of insulin ([facility]) E11.42   • Essential hypertension I10   • Hypercholesteremia E78.00   • Cervical spine disease M48.9   • Gastroesophageal reflux disease without esophagitis K21.9   • Lower extremity edema R60.0   • Arthritis M19.90   • Vitamin B12 deficiency E53.8   • 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   • LVH (left ventricular hypertrophy) I51.7   • Atrial fibrillation with RVR ([facility]) I48.91   • Cardiomyopathy ([facility]) I42.9   • Shortness of breath R06.02   • NSTEMI (non-ST elevated myocardial infarction) ([facility]) I21.4   • Nonrheumatic mitral valve regurgitation I34.0   • HOCM (hypertrophic obstructive cardiomyopathy) ([facility]) I42.1   • H/O myomectomy Z98.890   • Bradycardia R00.1   • Complete heart block ([facility]) I44.2   • LBBB (left bundle branch block) I44.7   • Near syncope R55   • H/O ventricular septal myectomy Z98.890   • Arrhythmia I49.9       Past Medical History:        Diagnosis Date   • CTS (carpal tunnel syndrome)    • DM (diabetes mellitus) ([facility])     foot 4/07, micral 4/07   • Elevated LFT's    • HTN    • Hyperlipidemia     neg gxt 11/00   • Lung mass     stable   • Peripheral neuropathy    • Protein in urine        Past Surgical History:        Procedure Laterality Date   • HYSTERECTOMY (CERVIX STATUS UNKNOWN)     • LUMBAR DISC

## 2024-10-17 NOTE — PLAN OF CARE
[facility]    Nerve Cath Post Op Call    Patient Name: Bony Barnes  :  1962  MRN:  [id number]  Date of Discharge: 2022    Nerve Cath Post Op Call:    Catheter Plan:Patient called, No answer. Message left to call [facility] pain service for any questions or complaints  Patient/Family instructed to call ON CALL anesthesia provider for any questions or problems.  Patient Follow Up:                                   Problem: Chronic Conditions and Co-morbidities  Goal: Patient's chronic conditions and co-morbidity symptoms are monitored and maintained or improved  Outcome: Progressing     Problem: Discharge Planning  Goal: Discharge to home or other facility with appropriate resources  Outcome: Progressing     Problem: ABCDS Injury Assessment  Goal: Absence of physical injury  Outcome: Progressing     Problem: Safety - Adult  Goal: Free from fall injury  Outcome: Progressing

## 2024-10-17 NOTE — PROGRESS NOTES
[facility] notified of upcoming transfer to room 436. C4 RASHID Treadwell updated. Chest site soft/dry (a few dried blood spots that are unremarkable). VSS room air. Post EKG and CXR completed.

## 2024-10-18 ENCOUNTER — APPOINTMENT (OUTPATIENT)
Dept: GENERAL RADIOLOGY | Age: 79
End: 2024-10-18
Attending: INTERNAL MEDICINE
Payer: MEDICARE

## 2024-10-18 ENCOUNTER — PROCEDURE VISIT (OUTPATIENT)
Dept: CARDIOLOGY CLINIC | Age: 79
End: 2024-10-18

## 2024-10-18 VITALS
OXYGEN SATURATION: 89 % | SYSTOLIC BLOOD PRESSURE: 128 MMHG | DIASTOLIC BLOOD PRESSURE: 73 MMHG | RESPIRATION RATE: 16 BRPM | HEART RATE: 75 BPM | BODY MASS INDEX: 25.62 KG/M2 | WEIGHT: 150.1 LBS | TEMPERATURE: 98.5 F | HEIGHT: 64 IN

## 2024-10-18 DIAGNOSIS — Z95.0 PACEMAKER: Primary | ICD-10-CM

## 2024-10-18 DIAGNOSIS — R00.1 BRADYCARDIA: ICD-10-CM

## 2024-10-18 LAB
ANION GAP SERPL CALCULATED.3IONS-SCNC: 12 MMOL/L (ref 3–16)
BASOPHILS # BLD: 0 K/UL (ref 0–0.2)
BASOPHILS NFR BLD: 0.5 %
BUN SERPL-MCNC: 21 MG/DL (ref 7–20)
CALCIUM SERPL-MCNC: 8.9 MG/DL (ref 8.3–10.6)
CHLORIDE SERPL-SCNC: 102 MMOL/L (ref 99–110)
CO2 SERPL-SCNC: 22 MMOL/L (ref 21–32)
CREAT SERPL-MCNC: 1.1 MG/DL (ref 0.6–1.2)
DEPRECATED RDW RBC AUTO: 12.9 % (ref 12.4–15.4)
EOSINOPHIL # BLD: 0.2 K/UL (ref 0–0.6)
EOSINOPHIL NFR BLD: 2.3 %
GFR SERPLBLD CREATININE-BSD FMLA CKD-EPI: 51 ML/MIN/{1.73_M2}
GLUCOSE BLD-MCNC: 194 MG/DL (ref 70–99)
GLUCOSE BLD-MCNC: 292 MG/DL (ref 70–99)
GLUCOSE SERPL-MCNC: 213 MG/DL (ref 70–99)
HCT VFR BLD AUTO: 41.6 % (ref 36–48)
HGB BLD-MCNC: 14.2 G/DL (ref 12–16)
LYMPHOCYTES # BLD: 1.3 K/UL (ref 1–5.1)
LYMPHOCYTES NFR BLD: 18.1 %
MCH RBC QN AUTO: 32.4 PG (ref 26–34)
MCHC RBC AUTO-ENTMCNC: 34.2 G/DL (ref 31–36)
MCV RBC AUTO: 94.7 FL (ref 80–100)
MONOCYTES # BLD: 1.1 K/UL (ref 0–1.3)
MONOCYTES NFR BLD: 15.7 %
NEUTROPHILS # BLD: 4.6 K/UL (ref 1.7–7.7)
NEUTROPHILS NFR BLD: 63.4 %
PERFORMED ON: ABNORMAL
PERFORMED ON: ABNORMAL
PLATELET # BLD AUTO: 208 K/UL (ref 135–450)
PMV BLD AUTO: 8.7 FL (ref 5–10.5)
POTASSIUM SERPL-SCNC: 4 MMOL/L (ref 3.5–5.1)
RBC # BLD AUTO: 4.39 M/UL (ref 4–5.2)
SODIUM SERPL-SCNC: 136 MMOL/L (ref 136–145)
WBC # BLD AUTO: 7.3 K/UL (ref 4–11)

## 2024-10-18 PROCEDURE — 6370000000 HC RX 637 (ALT 250 FOR IP): Performed by: INTERNAL MEDICINE

## 2024-10-18 PROCEDURE — 85025 COMPLETE CBC W/AUTO DIFF WBC: CPT

## 2024-10-18 PROCEDURE — 71046 X-RAY EXAM CHEST 2 VIEWS: CPT

## 2024-10-18 PROCEDURE — 6370000000 HC RX 637 (ALT 250 FOR IP)

## 2024-10-18 PROCEDURE — 2580000003 HC RX 258

## 2024-10-18 PROCEDURE — 99232 SBSQ HOSP IP/OBS MODERATE 35: CPT

## 2024-10-18 PROCEDURE — 80048 BASIC METABOLIC PNL TOTAL CA: CPT

## 2024-10-18 PROCEDURE — 2580000003 HC RX 258: Performed by: INTERNAL MEDICINE

## 2024-10-18 PROCEDURE — 36415 COLL VENOUS BLD VENIPUNCTURE: CPT

## 2024-10-18 RX ADMIN — INSULIN LISPRO 1 UNITS: 100 INJECTION, SOLUTION INTRAVENOUS; SUBCUTANEOUS at 08:54

## 2024-10-18 RX ADMIN — INSULIN LISPRO 2 UNITS: 100 INJECTION, SOLUTION INTRAVENOUS; SUBCUTANEOUS at 12:01

## 2024-10-18 RX ADMIN — APIXABAN 5 MG: 5 TABLET, FILM COATED ORAL at 08:52

## 2024-10-18 RX ADMIN — SODIUM CHLORIDE, PRESERVATIVE FREE 10 ML: 5 INJECTION INTRAVENOUS at 08:53

## 2024-10-18 RX ADMIN — Medication 1000 UNITS: at 08:52

## 2024-10-18 RX ADMIN — SODIUM CHLORIDE, PRESERVATIVE FREE 10 ML: 5 INJECTION INTRAVENOUS at 09:22

## 2024-10-18 RX ADMIN — ASPIRIN 81 MG 81 MG: 81 TABLET ORAL at 08:53

## 2024-10-18 RX ADMIN — SODIUM CHLORIDE, PRESERVATIVE FREE 10 ML: 5 INJECTION INTRAVENOUS at 08:54

## 2024-10-18 ASSESSMENT — PAIN SCALES - GENERAL
PAINLEVEL_OUTOF10: 0

## 2024-10-18 NOTE — CARE COORDINATION
CASE MANAGEMENT DISCHARGE SUMMARY      Discharge to:  home      IMM given: today     Transportation:    Family/car: yes   Confirmed discharge plan with:     Patient: yes     Family:  yes    Name: Contact number:     Facility/Agency, name:  [facility] faxed   Phone number for report to facility:      RN, name: Yanci    Note: Discharging nurse to complete [facility], reconcile [facility], and place final copy with patient's discharge packet. RN to ensure that written prescriptions for  Level II medications are sent with patient to the facility as per protocol.    Prabha Kaye RN

## 2024-10-18 NOTE — PROGRESS NOTES
DEVICE MODEL  [id number] Erica™ XT DR MRI  DEVICE SERIAL NUMBER  [id number]  DEVICE TYPE  Pacemaker  DATE OF IMPLANT  17-Oct-2024  Monitor Information  MONITOR STATUS  Distributed to patient  DISTRIBUTION METHOD  Distributed from clinic inventory  DISTRIBUTION DATE  18-Oct-2024  MONITOR SERIAL NUMBER  [id number]  MONITOR MODEL  [employer]™ [id number]

## 2024-10-18 NOTE — ANESTHESIA POSTPROCEDURE EVALUATION
Department of Anesthesiology  Postprocedure Note    Patient: Stephanie Saunders  MRN: [id number]  YOB: 1945  Date of evaluation: 10/18/2024    Procedure Summary       Date: 10/17/24 Room / Location: [facility] CATH/EP LAB  3 / [facility] CARDIAC CATH LAB    Anesthesia Start: 1003 Anesthesia Stop: 1136    Procedure: Insert PPM dual Diagnosis:       Arrhythmia      (Arrhythmia [I49.9])    Providers: Damon Randhawa MD Responsible Provider: Jayce Wright MD    Anesthesia Type: MAC ASA Status: 3            Anesthesia Type: No value filed.    Sarah Phase I:      Sarah Phase II:      Anesthesia Post Evaluation    Patient location during evaluation: PACU  Patient participation: complete - patient participated  Level of consciousness: awake and alert  Pain score: 0  Airway patency: patent  Nausea & Vomiting: no nausea and no vomiting  Cardiovascular status: blood pressure returned to baseline  Respiratory status: acceptable  Hydration status: stable  Pain management: adequate    There were no known notable events for this encounter.

## 2024-10-18 NOTE — DISCHARGE SUMMARY
V2.0  Discharge Summary    Name:  Stephanie Saunders /Age/Sex: 1945 (79 y.o. female)   Admit Date: 10/15/2024  Discharge Date: 10/18/24    MRN & CSN:  [id number] & [id number] Encounter Date and Time 10/18/24 11:40 AM EDT    Attending:  Ra Delgado MD Discharging Provider: Mirta Frye DO       Hospital Course:     Brief HPI: Stephanie Saunders is a 79 y.o. female with a pmh of of CTS (carpal tunnel syndrome), DM (diabetes mellitus) (HCC), Elevated LFT's, HTN, Hyperlipidemia, Lung mass, Peripheral neuropathy, HOCM, and Protein in urine who presents with Complete heart block (HCC)     Brief Problem Based Course:     Bradycardia  Complete Heart Block:   -EKG showing sinus bradycardia with complete heart block  -Cardiology signed off  -S/P Pacemaker Placement 10/17  -Continue follow-up with electrophysiology outpatient      Hypertrophic obstructive cardiomyopathy  -nongenetic, nonfamilial   -S/P surgical myectomy on 2023   -No significant exertional symptoms   -Continue to monitor per PCP outpatient      Paroxysmal atrial fibrillation   -Continue home anticoagulation with Eliquis     Hypertension   -Well controlled on home regimen  -Continue home regimen   -Follow-up with PCP outpatient for management      Diabetes Mellitus Type 2  -SSI ordered for hospital course   -Hypoglycemia protocol during hospital stay   -POC Glucose and carb control diet for hospital course   -Jardiance held for pacemaker placement, restarted at discharge   -Follow-up with PCP for continued management      Hyperlipidemia   -Continue Statin      Hx of Lung mass   -Known 5.2 cm mass at the left lung base  -23 Stable lingular mass which was favored to be a hamartoma on chest CT  -44.2 pack-year smoking history  -Followed outpatient. Follow-up with PCP for repeat CT if indicated    The patient expressed appropriate understanding of, and agreement with the discharge recommendations, medications, and plan.     Consults  Pacing impedance was 646 Ohms. Capture threshold was at 0.3 volts at 0.4 milliseconds. 2. Right ventricular lead: Sensing of the R wave was in the range of 21.8 millivolts. Pacing impedance was 912 Ohms. Capture threshold was at 0.6 volts at 0.4 milliseconds. Programmed Parameters: Mode: AAI <--> DDD, rate 60 to 130 Sensed AV interval 180 msec Paced AV interval 150 msec SUMMARY: Successful implantation of a dual-chamber PPM. RECOMMENDATIONS: 1. Bed rest x 4 hours. 2. left arm in sling for 24 hours. 3. Dressing in place for 24 hours. 4. Pain control. 5. Vital signs per protocol. 6. Portable chest x-ray to rule out pneumothorax. 7. ECG upon arrival on floor and daily. 8. Monitor on telemetry. 9. Avoid use of subcutaneous heparin and LMWH to minimize the risk of pocket hematomas. 10. Follow-up in the electrophysiology clinic in 1 to 2 weeks for wound and device evaluation.    Echo (TTE) complete (PRN contrast/bubble/strain/3D)    Result Date: 10/15/2024    Image quality is adequate.   Left Ventricle: Normal left ventricular systolic function with a visually estimated EF of 65 - 70%. EF by 2D Simpsons Biplane is 70%. Left ventricle size is normal. Moderate to severely increased wall thickness. Findings consistent with concentric hypertrophy.  Maximal septal thickness near the mid septum is 1.86 cm in diastole. Septal motion is consistent with bundle branch block. Normal and hyperdynamic wall motion. Grade II diastolic dysfunction with increased LAP. No LVOT obstruction at rest.   Right Ventricle: Right ventricle size is normal. Normal systolic function.   Left Atrium: Left atrium appears visually dilated.   Aortic Valve: Trileaflet valve. Mild sclerosis of the aortic valve, noncoronary cusp. No stenosis.   Mitral Valve: Mild regurgitation.   Tricuspid Valve: Mild regurgitation. Normal RVSP. Est RA pressure is 3 mmHg. The estimated RVSP is 32 mmHg.   IVC/SVC: IVC diameter is less than or equal to 21 mm and decreases

## 2024-10-18 NOTE — DISCHARGE INSTRUCTIONS
EP clinic in 1 week 10/24/2024 and EP clinic in 3 months with NPKK--1/15/2025. Please return if symptoms worsen or continue to persist.

## 2024-10-18 NOTE — DISCHARGE INSTR - COC
Continuity of Care Form    Patient Name: Stephanie Saunders   :  1945  MRN:  [id number]    Admit date:  10/15/2024  Discharge date:  10/501403      Code Status Order: Full Code   Advance Directives:   Advance Care Flowsheet Documentation             Admitting Physician:  Ra Delgado MD  PCP: Ramin Marroquin MD    Discharging Nurse: Yanci Jc RN  Discharging Hospital Unit/Room#: 0436/0436-01  Discharging Unit Phone Number: [phone]    Emergency Contact:   Extended Emergency Contact Information  Primary Emergency Contact: Mickey Ng  Mobile Phone: [phone]  Relation: Child  Preferred language: English   needed? No  Secondary Emergency Contact: ChipFuad  Address: [address] [place] [address]  Home Phone: [phone]  Mobile Phone: [phone]  Relation: Spouse    Past Surgical History:  Past Surgical History:   Procedure Laterality Date    HYSTERECTOMY (CERVIX STATUS UNKNOWN)      LUMBAR DISC SURGERY      TRE AND BSO (CERVIX REMOVED)         Immunization History:   Immunization History   Administered Date(s) Administered    COVID-19, MODERNA BLUE border, Primary or Immunocompromised, (age 12y+), IM, 100 mcg/0.5mL 2021, 2021    Influenza 2010, 2012, 2013    Influenza A (W0L1-21) Vaccine PF IM 2009    Influenza Virus Vaccine 2012, 2013, 2015    Influenza Whole 10/01/2008    Influenza, AFLURIA (age 3 y+), FLUZONE, (age 6 mo+), Quadv MDV, 0.5mL 10/26/2016, 10/31/2017, 2018, 2019    Influenza, FLUAD, (age 65 y+), IM, Quadv, 0.5mL 10/22/2020, 10/25/2022, 2023    Influenza, FLUCELVAX, (age 6 mo+), MDCK, Quadv PF, 0.5mL 2021    Pneumococcal, PCV-13, PREVNAR 13, (age 6w+), IM, 0.5mL 2015    Pneumococcal, PPSV23, PNEUMOVAX 23, (age 2y+), SC/IM, 0.5mL 2005, 2013    Td, unspecified formulation 1999    Zoster Recombinant (Shingrix)  Independent  Transfer  Independent  Bathing  Independent  Dressing  Independent  Toileting  Independent  Feeding  Independent  Med Admin  Independent  Med Delivery   none    Wound Care Documentation and Therapy:  Wound 10/17/24 Chest Lateral;Left;Upper incision (Active)   Number of days: 0        Elimination:  Continence:   Bowel: Yes  Bladder: Yes  Urinary Catheter: None   Colostomy/Ileostomy/Ileal Conduit: No       Date of Last BM: ***    Intake/Output Summary (Last 24 hours) at 10/18/2024 0945  Last data filed at 10/17/2024 1940  Gross per 24 hour   Intake 240 ml   Output 10 ml   Net 230 ml     I/O last 3 completed shifts:  In: 480 [P.O.:480]  Out: 10 [Blood:10]    Safety Concerns:     None    Impairments/Disabilities:      None    Nutrition Therapy:  Current Nutrition Therapy:   - Oral Diet:  Cardiac    Routes of Feeding: Oral  Liquids: Thin Liquids  Daily Fluid Restriction: no  Last Modified Barium Swallow with Video (Video Swallowing Test): not done    Treatments at the Time of Hospital Discharge:   Respiratory Treatments: none    Oxygen Therapy:  is not on home oxygen therapy.  Ventilator:    - No ventilator support    Rehab Therapies: none    Weight Bearing Status/Restrictions: No weight bearing restrictions  Other Medical Equipment (for information only, NOT a DME order):  none    Other Treatments: none      Patient's personal belongings (please select all that are sent with patient):  Dentures upper    RN SIGNATURE:  Electronically signed by Yanci Jc RN on 10/18/24 at 1:20 PM EDT    CASE MANAGEMENT/SOCIAL WORK SECTION    Inpatient Status Date: ***    Readmission Risk Assessment Score:  Readmission Risk              Risk of Unplanned Readmission:  14           Discharging to Facility/ Agency   Name:   Address:  Phone:  Fax:    Dialysis Facility (if applicable)   Name:  Address:  Dialysis Schedule:  Phone:  Fax:    / signature: {Esignature:[id number]}    PHYSICIAN

## 2024-10-18 NOTE — PLAN OF CARE
Problem: Chronic Conditions and Co-morbidities  Goal: Patient's chronic conditions and co-morbidity symptoms are monitored and maintained or improved  Outcome: Adequate for Discharge     Problem: Discharge Planning  Goal: Discharge to home or other facility with appropriate resources  Outcome: Adequate for Discharge     Problem: ABCDS Injury Assessment  Goal: Absence of physical injury  Outcome: Adequate for Discharge     Problem: Safety - Adult  Goal: Free from fall injury  Outcome: Adequate for Discharge     Problem: Pain  Goal: Verbalizes/displays adequate comfort level or baseline comfort level  Outcome: Adequate for Discharge

## 2024-10-18 NOTE — PROGRESS NOTES
[facility]     Electrophysiology                                     Progress Note    Admission date:  10/15/2024    Reason for follow up visit: Bradycardia/AV block    HPI/CC: Stephanie Saunders was admitted on 10/15/2024 with lightheadedness.  Patient was noted to have heart rate in the 40s at home on her blood pressure machine. EP consulted.  Plan for DC PPM on 10/17/2024    Rhythm has been SR.     Subjective: Patient resting in bed.  Denies chest pain shortness of breath and palpitations.  Left upper chest dressing removed.  Steri-Strips dry and intact.  No bleeding or hematoma noted.  Went over post care instructions at length with patient.  Verbalized understanding.  Awaiting [employer] representative to perform device check before discharge      Vitals:  Blood pressure 125/75, pulse 69, temperature 99.1 °F (37.3 °C), temperature source Oral, resp. rate 16, height 1.626 m (5' 4\"), weight 68.1 kg (150 lb 1.6 oz), SpO2 95%.  Temp  Av.3 °F (37.4 °C)  Min: 98.2 °F (36.8 °C)  Max: 100.5 °F (38.1 °C)  Pulse  Av.9  Min: 69  Max: 86  BP  Min: 100/59  Max: 141/68  SpO2  Av.7 %  Min: 91 %  Max: 95 %    24 hour I/O    Intake/Output Summary (Last 24 hours) at 10/18/2024 0947  Last data filed at 10/17/2024 1940  Gross per 24 hour   Intake 240 ml   Output 10 ml   Net 230 ml     Current Facility-Administered Medications   Medication Dose Route Frequency Provider Last Rate Last Admin    sodium chloride flush 0.9 % injection 5-40 mL  5-40 mL IntraVENous 2 times per day Damon Randhawa MD   10 mL at 10/18/24 0854    sodium chloride flush 0.9 % injection 5-40 mL  5-40 mL IntraVENous PRN Damon Randhawa MD        0.9 % sodium chloride infusion   IntraVENous PRN Damon Randhawa MD        apixaban (ELIQUIS) tablet 5 mg  5 mg Oral BID Mirta Frye K, DO   5 mg at 10/18/24 0852    aspirin chewable tablet 81 mg  81 mg Oral Daily Mirta Frye K DO   81 mg at 10/18/24 0853    atorvastatin (LIPITOR) tablet

## 2024-10-18 NOTE — PROGRESS NOTES
Patient discharge completed. Discharge information included information on diagnosis including signs and symptoms, complications and when to seek medical attention. Information on new medications also provided included use for the medication, side effects and when to call the doctor. Patient verbalized understanding of all discharge information. Patient escorted out by staff with all documented belongings. Home with family.    No

## 2024-10-21 ENCOUNTER — CARE COORDINATION (OUTPATIENT)
Dept: CASE MANAGEMENT | Age: 79
End: 2024-10-21

## 2024-10-21 DIAGNOSIS — I48.91 ATRIAL FIBRILLATION WITH RVR (HCC): Primary | ICD-10-CM

## 2024-10-21 PROBLEM — Z95.0 PACEMAKER: Status: ACTIVE | Noted: 2024-10-21

## 2024-10-21 PROCEDURE — 1111F DSCHRG MED/CURRENT MED MERGE: CPT | Performed by: FAMILY MEDICINE

## 2024-10-21 NOTE — PROGRESS NOTES
Physician Progress Note      PATIENT:               STACEY PECK  CSN #:                  [id number]  :                       1945  ADMIT DATE:       10/14/2024 6:05 PM  DISCH DATE:        10/15/2024 3:24 PM  RESPONDING  PROVIDER #:        Chau Payne DO          QUERY TEXT:    Pt admitted with complete heart block.  Noted documentation of HOCM s/p   myectomy.  If possible, please document in progress notes and discharge   summary the clinical indicators to support this diagnosis on current admission   or clarify current status of HOCM.  The medical record reflects the following:  Risk Factors: 79 year old w/ afib, CAD  Clinical Indicators: 10/15 Cardiology consult note- HOCM s/p myectomy... YEIMY   with moderate MR- Improved from severe after myectomy. 10/15 TTE- -  Left   Ventricle: Normal left ventricular systolic function with a visually estimated   EF of 65 - 70%. EF by 2D Simpsons Biplane is 70%. Left ventricle size is   normal. Moderate to severely increased wall thickness. Findings consistent   with concentric hypertrophy.  Maximal septal thickness near the mid septum is   1.86 cm in diastole. Septal motion is consistent with bundle branch block.   Normal and hyperdynamic wall motion. Grade II cervantes  Treatment: Echo  Options provided:  -- HOCM currently being treated/evaluated as evidenced by, Please document   supportive evidence.  -- No clinical evidence of HOCM currently  -- HOCM is PMH only  -- Other - I will add my own diagnosis  -- Disagree - Not applicable / Not valid  -- Disagree - Clinically unable to determine / Unknown  -- Refer to Clinical Documentation Reviewer    PROVIDER RESPONSE TEXT:    HOCM is PMH only.    Query created by: Nasrin Patton on 10/21/2024 9:54 AM      QUERY TEXT:    Patient admitted with complete heart block.  Noted documentation of Acute   Kidney Injury in 10/15 Cardiology consult note.  In order to support the   diagnosis of JALEN, please include additional

## 2024-10-21 NOTE — CARE COORDINATION
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.8]   ibuprofen (ADVIL/MOTRIN) 400 MG tablet 200mg 1 tablet (300mg) Once at 12:00pm oral 23  N/A                                             All authorizations  at the end of the school year or at the end of   Extended School Year summer school programs      Azam Mullen md                  *electronically signed 1/3/23 at 1:13pm *  _________________________________________________________________________________________________    Print or type Name of Physician / Licensed Prescriber                     Signature of Physician / Licensed Prescriber    Clinic Address:                                                                                  Today s Date: 24      [facility]    [facility] [id number] [phone]                                                                Parent / Guardian Authorization  I request that the above mediation(s) be given during school hours as ordered by this student s physician/licensed prescriber.  I also request that the medication(s) be given on field trips, as prescribed.   I release school personnel from liability in the event adverse reactions result from taking medication(s).  I will notify the school of any change in the medication(s), (ex: dosage change, medication is discontinued, etc.)  I give permission for the school nurse or designee to communicate with the student s teachers about the student s health condition(s) being treated  Care Transitions Note    Initial Call - Call within 2 business days of discharge: Yes    Patient Current Location:  Home: [address]  [facility] [address]    Care Transition Nurse contacted the patient by telephone to perform post hospital discharge assessment, verified name and  as identifiers. Provided introduction to self, and explanation of the Care Transition Nurse role.     Patient: Stephanie Saunders    Patient : 1945   MRN: [id number]    Reason for Admission: [facility] x 3 days -> advanced heart block s/p pacemaker-rate 71, hypertrophic obstructive cardiomyopathy, A Fib on Eliquis, DM2, HTN, HLD, nonobstructive CAD, bradycardia -> home no services, f/up EP clinic in 1 week  Discharge Date: 10/18/24  RURS: Readmission Risk Score: 8.9    Last Discharge Facility       Date Complaint Diagnosis Description Type Department Provider    10/15/24  Arrhythmia Admission (Discharged) RICCARDO Delgado, Ra Mcknight MD     Was this an external facility discharge? No    Additional needs identified to be addressed with provider   No needs identified         Method of communication with provider: none.    Patients top risk factors for readmission: medical condition-     Interventions to address risk factors:   Review of patient management of conditions/medications:      Care Summary Note:   No issues or concerns at site of pacemaker placement. The area is tender and she is aware of restrictions. Denies f/c/n/v/d. Patient did not start the ASA 81mg daily because she states her cardiologist previously stopped this medication when she started Eliquis. She sees Dr Dyson at [facility] and plans to contact his office to schedule follow up and make final recommendation on yes or no for ASA 81mg. She has not missed any doses of her Eliquis. Her  this morning she takes her glipizide differently than rx was written and this is noted on her med list. She will monitor closely and if her BS do not start trending down contact her PCP  by the medication(s), as well as ongoing data on medication effects provided to physician / licensed prescriber and parent / legal guardian via monitoring form.                  ___________________________________________________           __________________________    Parent/Guardian Signature                                                                                                  Relationship to Student      Phone Numbers: [phone] (home)                                                                                      Today s Date: 01/02/24            NOTE: Medication is to be supplied in the original/prescription bottle.    Signatures must be completed in order to administer medication. If medication policy is not folloewed, school health services will not be able to administer medication, which may adversely affect educational outcomes or this student s safety.

## 2024-10-22 NOTE — TELEPHONE ENCOUNTER
Patient scheduled for routine follow up with you in November - okay to wait until then or do you want her to come in sooner with Erin

## 2024-10-24 ENCOUNTER — NURSE ONLY (OUTPATIENT)
Dept: CARDIOLOGY CLINIC | Age: 79
End: 2024-10-24

## 2024-10-24 DIAGNOSIS — R00.1 BRADYCARDIA: ICD-10-CM

## 2024-10-24 DIAGNOSIS — Z95.0 PACEMAKER: Primary | ICD-10-CM

## 2024-10-24 NOTE — PATIENT INSTRUCTIONS
New Cardiac Device Implant (Pacemaker and/or Defibrillator) Post Op Instructions  Bathe with water indirectly hitting the incision site. Water and soap may run over the incision site. Do not scrub. Pat dry with a clean towel after bathing.   Leave incision open to the air; do not apply any dressings, ointments, or bandages to the area. Do not apply lotion, perfume/cologne, or powders to the area until it is completely healed.   Any scabbing or skin glue that is noted will fall off within 1-2 weeks after the post op appointment.   If any oozing, bleeding, or pus drainage occurs, please call the office immediately at [phone].       Patient has movement restrictions in place until 4 weeks post op (to the day of implant) unless otherwise instructed by physician.   Patient may not lift the device side arm above shoulder height.   Do not far reach or stretch across body or behind body with effected side.   Do not use this arm for pushing, pulling, or lifting body.   Do not use cane on the effected side.   Patient may not lift anything heavier than a gallon of milk with the associated arm.     Appointments to expect going forward:  Post operatively the patient will have had a 1-week post op check and a 3 month follow up with NP/MD and the device clinic.       Remote Monitoring Instructions    Within 2-3 weeks of your device being implanted, you will receive a call from the  of your device. Please answer this call as it is to set up remote monitoring for your device. Once you receive your in-home monitor, please follow the instructions provided to sync the home monitor to your implanted device. Once you have paired your home monitor to your implanted device, keep your monitor plugged in within 6 feet of where you sleep. Your monitor will routinely check in with your device during sleep hours and transmit any urgent events to the Device Clinic for review.     Please do not send additional routine

## 2024-10-24 NOTE — PROGRESS NOTES
Incision is closed, clean, and dry with all dressings/steri strips removed. Site left open to the air. Incision well approximated. No s/s of infection.    Patient education was provided about site care, device functionality, in home monitoring, and any other patient questions and/or concerns were addressed. Aftercare and remote monitoring literature was provided. Patient is to call with any signs or symptoms of infection. Patient voices understanding.

## 2024-10-25 ENCOUNTER — CARE COORDINATION (OUTPATIENT)
Dept: CASE MANAGEMENT | Age: 79
End: 2024-10-25

## 2024-10-25 NOTE — CARE COORDINATION
Provider Specialty Dept Phone    11/27/2024 10:40 AM Ramin Marroquin MD Family Medicine [phone]    1/15/2025 3:15 PM LEONIDES, NOLAN DEVICE CHECK Cardiology [phone]    1/15/2025 3:15 PM Damaris Rizvi APRN - CNP Cardiology [phone]            Care Transition Nurse provided contact information.  Plan for follow-up call in 6-10 days based on severity of symptoms and risk factors.  Plan for next call: self management-.      Sally Cameron, MSN, RN, [facility]  Care Transition Nurse  [phone] mobile

## 2024-10-31 ENCOUNTER — OFFICE VISIT (OUTPATIENT)
Dept: FAMILY MEDICINE CLINIC | Age: 79
End: 2024-10-31

## 2024-10-31 VITALS
HEART RATE: 94 BPM | DIASTOLIC BLOOD PRESSURE: 70 MMHG | OXYGEN SATURATION: 97 % | WEIGHT: 154.2 LBS | BODY MASS INDEX: 26.32 KG/M2 | HEIGHT: 64 IN | TEMPERATURE: 97.6 F | SYSTOLIC BLOOD PRESSURE: 122 MMHG

## 2024-10-31 DIAGNOSIS — R05.1 ACUTE COUGH: ICD-10-CM

## 2024-10-31 DIAGNOSIS — B37.31 VAGINAL YEAST INFECTION: ICD-10-CM

## 2024-10-31 DIAGNOSIS — J40 BRONCHITIS: Primary | ICD-10-CM

## 2024-10-31 RX ORDER — FLUCONAZOLE 100 MG/1
100 TABLET ORAL DAILY
Qty: 3 TABLET | Refills: 0 | Status: SHIPPED | OUTPATIENT
Start: 2024-10-31 | End: 2024-11-03

## 2024-10-31 RX ORDER — AZITHROMYCIN 250 MG/1
TABLET, FILM COATED ORAL
Qty: 6 TABLET | Refills: 0 | Status: SHIPPED | OUTPATIENT
Start: 2024-10-31 | End: 2024-11-10

## 2024-10-31 ASSESSMENT — ENCOUNTER SYMPTOMS
ALLERGIC/IMMUNOLOGIC NEGATIVE: 1
GASTROINTESTINAL NEGATIVE: 1
CHEST TIGHTNESS: 0
EYES NEGATIVE: 1
SHORTNESS OF BREATH: 0
COUGH: 1

## 2024-10-31 NOTE — PROGRESS NOTES
Subjective:      Patient ID: Stephanie Saunders is a 79 y.o. female.    Chief Complaint   Patient presents with    Cough     X a couple of weeks     Vaginitis        HPI    Patient presents today with a 2 weeks history of intermittent cough. Patient reports over the past few days the cough has worsened and is now productive cough of green phlegm. Patient reports initially she had nasal congestion and fever however this has resolved.       Review of Systems   Constitutional: Negative.  Negative for activity change, appetite change, chills, fever and unexpected weight change.   HENT: Negative.  Negative for sneezing.    Eyes: Negative.    Respiratory:  Positive for cough (productive of green phlegm). Negative for chest tightness and shortness of breath.    Cardiovascular: Negative.  Negative for chest pain, palpitations and leg swelling.   Gastrointestinal: Negative.    Endocrine: Negative.    Genitourinary: Negative.    Skin: Negative.    Allergic/Immunologic: Negative.    Neurological:  Negative for dizziness and headaches.   Psychiatric/Behavioral: Negative.         Patient Active Problem List   Diagnosis    DM (diabetes mellitus)    Mixed hyperlipidemia    Protein in urine    CTS (carpal tunnel syndrome)    Lung mass    Vitamin D deficiency    Elevated TSH    Osteopenia    Type 2 diabetes mellitus with diabetic polyneuropathy, without long-term current use of insulin ([facility])    Primary hypertension    Hypercholesteremia    Cervical spine disease    Gastroesophageal reflux disease without esophagitis    Lower extremity edema    Arthritis    Vitamin B12 deficiency    Idiopathic progressive neuropathy    LVH (left ventricular hypertrophy)    Atrial fibrillation with RVR ([facility])    Cardiomyopathy ([facility])    Shortness of breath    NSTEMI (non-ST elevated myocardial infarction) ([facility])    Nonrheumatic mitral valve regurgitation    HOCM (hypertrophic obstructive cardiomyopathy) ([facility])    H/O myomectomy    Symptomatic bradycardia

## 2024-11-01 ENCOUNTER — CARE COORDINATION (OUTPATIENT)
Dept: CASE MANAGEMENT | Age: 79
End: 2024-11-01

## 2024-11-01 NOTE — CARE COORDINATION
Care Transitions Note    Follow Up Call     Patient Current Location:  [place]    Care Transition Nurse contacted the patient by telephone. Verified name and  as identifiers.    Additional needs identified to be addressed with provider   No needs identified                 Method of communication with provider: none.    Care Summary Note: CTN spoke with patient who reported she is doing pretty good. Patient had follow up with PCP yesterday and with cardiologist on 10/28. Patient reported Dr. Dyson instructed patient that she no longer needs to take ASA since she is on the eliquis. Patient denied any other issues or concerns at this time.     Plan of care updates since last contact:  Education: .       Advance Care Planning:   Does patient have an Advance Directive: reviewed during previous call, see note. .    Medication Review:  No changes since last call.     Remote Patient Monitoring:  Offered patient enrollment in the Remote Patient Monitoring (RPM) program for in-home monitoring; see previous encounter     Assessments:  Care Transitions Subsequent and Final Call    Subsequent and Final Calls  Have your medications changed?: No  Do you have any questions related to your medications?: No  Do you currently have any active services?: No  Do you have any needs or concerns that I can assist you with?: No  Identified Barriers: None  Care Transitions Interventions  Other Interventions:              Follow Up Appointment:   Reviewed upcoming appointment(s).  Future Appointments         Provider Specialty Dept Phone    2024 10:40 AM Ramin Marroquin MD Family Medicine [phone]    1/15/2025 3:15 PM NOLAN COYLE DEVICE CHECK Cardiology [phone]    1/15/2025 3:15 PM Damaris Rizvi APRN - CNP Cardiology [phone]            Care Transition Nurse provided contact information.  Plan for follow-up call in 6-10 days based on severity of symptoms and risk factors.  Plan for next call: self management-.

## 2024-11-08 ENCOUNTER — HOSPITAL ENCOUNTER (OUTPATIENT)
Dept: GENERAL RADIOLOGY | Age: 79
Discharge: HOME | End: 2024-11-08
Payer: MEDICARE

## 2024-11-08 ENCOUNTER — HOSPITAL ENCOUNTER (OUTPATIENT)
Age: 79
Discharge: HOME | End: 2024-11-08
Payer: MEDICARE

## 2024-11-08 ENCOUNTER — CARE COORDINATION (OUTPATIENT)
Dept: CASE MANAGEMENT | Age: 79
End: 2024-11-08

## 2024-11-08 ENCOUNTER — TELEPHONE (OUTPATIENT)
Dept: FAMILY MEDICINE CLINIC | Age: 79
End: 2024-11-08

## 2024-11-08 DIAGNOSIS — J40 BRONCHITIS: ICD-10-CM

## 2024-11-08 DIAGNOSIS — J40 BRONCHITIS: Primary | ICD-10-CM

## 2024-11-08 PROCEDURE — 71046 X-RAY EXAM CHEST 2 VIEWS: CPT

## 2024-11-08 NOTE — TELEPHONE ENCOUNTER
Called and informed pt who states that she will go to [facility] to have imaging completed and will get some otc meds to help until we get the results

## 2024-11-08 NOTE — TELEPHONE ENCOUNTER
Pt called and states she was seen by [facility] on 10/31/2024 for Bronchitis. Pt states she was given a zpak and took and finished that and is still having some wheezing and a cough with small amount of production that is very thick.   Pt states she was advised if still having sxs after a week to call in to let provider know.

## 2024-11-08 NOTE — CARE COORDINATION
11/27/2024 10:40 AM Ramin Marroquin MD Family [facility] [phone]    1/15/2025 3:15 PM SCHEDULE, NOLAN DEVICE CHECK Cardiology [phone]    1/15/2025 3:15 PM Damaris Rizvi APRN - CNP Cardiology [phone]            Care Transition Nurse provided contact information.  Plan for follow-up call in 2-5 days based on severity of symptoms and risk factors.  Plan for next call: self management-.      Sally Cameron, MSN, RN, [facility]  Care Transition Nurse  [phone] mobile

## 2024-11-11 ENCOUNTER — CARE COORDINATION (OUTPATIENT)
Dept: CASE MANAGEMENT | Age: 79
End: 2024-11-11

## 2024-11-11 NOTE — CARE COORDINATION
Care Transitions Note    Follow Up Call     Patient Current Location:  [place]    Care Transition Nurse contacted the patient by telephone. Verified name and  as identifiers.    Additional needs identified to be addressed with provider   No needs identified                 Method of communication with provider: none.    Care Summary Note: CTN spoke with patient who reported she is feeling a little better and cough has improved after starting OTC decongestant as recommended by her PCP. Patient did have a CXR on Friday and will reach out to the office to follow up on the results. Patient reported her pacemaker site is healing well just still sensitive to touch. Patient denied any swelling or drainage at pacemaker site. CTN encouraged patient to continue to monitor.     Plan of care updates since last contact:  Education: .       Advance Care Planning:   Does patient have an Advance Directive: reviewed during previous call, see note. .    Medication Review:  No changes since last call.     Remote Patient Monitoring:  Offered patient enrollment in the Remote Patient Monitoring (RPM) program for in-home monitoring: see previous encounter    Assessments:  Care Transitions Subsequent and Final Call    Subsequent and Final Calls  Do you have any ongoing symptoms?: No  Have your medications changed?: No  Do you have any questions related to your medications?: No  Do you currently have any active services?: No  Do you have any needs or concerns that I can assist you with?: No  Identified Barriers: None  Care Transitions Interventions  Other Interventions:              Follow Up Appointment:   Reviewed upcoming appointment(s).  Future Appointments         Provider Specialty Dept Phone    2024 10:40 AM Ramin Marroquin MD Family Medicine [phone]    1/15/2025 3:15 PM NOLAN COYLE DEVICE CHECK Cardiology [phone]    1/15/2025 3:15 PM Damaris Rizvi APRN - CNP Cardiology [phone]            Care

## 2024-11-14 ENCOUNTER — TELEPHONE (OUTPATIENT)
Dept: FAMILY MEDICINE CLINIC | Age: 79
End: 2024-11-14

## 2024-11-14 NOTE — TELEPHONE ENCOUNTER
Patient calling for her cxr results done on 11/8/24, result is still preliminary, called [facility] @ [phone] spoke to Frannie states will give to the  To have completed.  Patient notified told her if she did not hear anything by tomorrow to call us back.

## 2024-11-18 ENCOUNTER — CARE COORDINATION (OUTPATIENT)
Dept: CASE MANAGEMENT | Age: 79
End: 2024-11-18

## 2024-11-18 NOTE — CARE COORDINATION
Care Transitions Note    Final Call     Patient Current Location:  [place]    Care Transition Nurse contacted the patient by telephone. Verified name and  as identifiers.    Patient graduated from the Care Transitions program on 24.  Patient/family has the ability to self manage at this time..      Advance Care Planning:   Does patient have an Advance Directive: reviewed during previous call, see note. .    Handoff:   Patient was not referred to the ACM team due to no additional needs identified.       Care Summary Note: CTN spoke with patient who reported she is doing better and her cough has improved. Patient reported she has mild cough but that it is much better. Patient reported she did reach out to the office regarding her cxr results and waiting to hear back from the office regarding follow up with pulmonologist if it is needed. Patient has apt with PCP on . CTN encouraged patient to continue to monitor symptoms and if worsening to notify PCP. Patient reported her pacemaker site is healing well and she will see cardiologist . Patient denied any other issues or concerns. Patient has contact information for CTN if any future needs.     Assessments:  Care Transitions Subsequent and Final Call    Subsequent and Final Calls  Do you have any ongoing symptoms?: No  Have your medications changed?: No  Do you have any questions related to your medications?: No  Do you currently have any active services?: No  Do you have any needs or concerns that I can assist you with?: No  Identified Barriers: None  Care Transitions Interventions  Other Interventions:              Upcoming Appointments:    Future Appointments         Provider Specialty Dept Phone    2024 10:40 AM Ramin Marroquin MD Family Medicine [phone]    1/15/2025 3:15 PM NOLAN COYLE DEVICE CHECK Cardiology [phone]    1/15/2025 3:15 PM Damaris Rizvi APRN - CNP Cardiology [phone]            Patient has agreed to

## 2024-11-27 ENCOUNTER — OFFICE VISIT (OUTPATIENT)
Dept: FAMILY MEDICINE CLINIC | Age: 79
End: 2024-11-27

## 2024-11-27 VITALS
OXYGEN SATURATION: 96 % | WEIGHT: 156.2 LBS | BODY MASS INDEX: 26.67 KG/M2 | DIASTOLIC BLOOD PRESSURE: 73 MMHG | HEIGHT: 64 IN | TEMPERATURE: 97 F | SYSTOLIC BLOOD PRESSURE: 133 MMHG | HEART RATE: 68 BPM

## 2024-11-27 DIAGNOSIS — E53.8 VITAMIN B12 DEFICIENCY: ICD-10-CM

## 2024-11-27 DIAGNOSIS — E11.42 TYPE 2 DIABETES MELLITUS WITH DIABETIC POLYNEUROPATHY, WITHOUT LONG-TERM CURRENT USE OF INSULIN (HCC): ICD-10-CM

## 2024-11-27 DIAGNOSIS — I44.2 COMPLETE HEART BLOCK (HCC): Primary | ICD-10-CM

## 2024-11-27 DIAGNOSIS — Z95.0 PACEMAKER: ICD-10-CM

## 2024-11-27 DIAGNOSIS — I48.91 ATRIAL FIBRILLATION WITH RVR (HCC): ICD-10-CM

## 2024-11-27 DIAGNOSIS — I42.9 CARDIOMYOPATHY, UNSPECIFIED TYPE (HCC): ICD-10-CM

## 2024-11-27 DIAGNOSIS — R79.89 ELEVATED TSH: ICD-10-CM

## 2024-11-27 DIAGNOSIS — Z23 NEED FOR INFLUENZA VACCINATION: ICD-10-CM

## 2024-11-27 PROBLEM — R00.1 SYMPTOMATIC BRADYCARDIA: Status: RESOLVED | Noted: 2024-10-14 | Resolved: 2024-11-27

## 2024-11-27 PROBLEM — I51.7 LEFT ATRIAL ENLARGEMENT: Status: ACTIVE | Noted: 2024-11-27

## 2024-11-27 PROBLEM — R06.02 SHORTNESS OF BREATH: Status: RESOLVED | Noted: 2022-12-05 | Resolved: 2024-11-27

## 2024-11-27 PROBLEM — R55 NEAR SYNCOPE: Status: RESOLVED | Noted: 2024-10-15 | Resolved: 2024-11-27

## 2024-11-27 PROBLEM — I48.0 PAROXYSMAL A-FIB (HCC): Status: ACTIVE | Noted: 2024-11-27

## 2024-11-27 PROBLEM — I49.9 ARRHYTHMIA: Status: RESOLVED | Noted: 2024-10-15 | Resolved: 2024-11-27

## 2024-11-27 PROBLEM — Z86.79 HISTORY OF COMPLETE HEART BLOCK: Status: ACTIVE | Noted: 2024-11-27

## 2024-11-27 PROBLEM — D14.32: Status: ACTIVE | Noted: 2024-11-27

## 2024-11-27 NOTE — PROGRESS NOTES
no edema    Assessment:  1. Complete heart block (HCC)    2. Pacemaker-MEDTRONIC DC implant 10/17/2024-MRI compatible    3. Atrial fibrillation with RVR (HCC)    4. Cardiomyopathy, unspecified type (HCC)    5. Type 2 diabetes mellitus with diabetic polyneuropathy, without long-term current use of insulin (HCC)    6. Elevated TSH    7. Vitamin B12 deficiency    8. Need for influenza vaccination            Plan:  Labs ordered  Today we reviewed most recent chest x-ray results.  We reviewed findings and recommendations from nurse practitioner visit for cough follow-up with cardiology about 1 month ago, hospitalization in mid October when pacemaker was implanted.  Continue current medications and treatments  Continue working with other providers as instructed  Flu shot  Follow-up in 6 months or as needed  The diagnoses listed in the assessment above are stable unless otherwise indicated.   Age-specific preventative health recommendations were reviewed and a \"Healthy Family Handout\" has been provided.  Avoid exposure to tobacco products.  Follow COVID-19 CDC guidelines.  Read and consider all information provided by the pharmacy regarding prescribed medications before use.    Call or return for any problems that arise before the next scheduled appointment.    The total time listed above may include, but is not limited to:  1.Time before the office visit- reviewing test results, visits with other doctors, and encounters in the ER or hospital since I last saw this patient.  2.Time with the patient in our face-to-face visit including counseling and educating.  3.Time after the office visit- documenting information in the EMR and ordering any necessary tests, medications, referrals, or procedures.        Ramin Marroquin MD    This note was transcribed using a voice recognition software system.  Proper technique and careful oversight were used to increase transcription accuracy but inadvertent errors may be present.

## 2024-11-28 LAB
ALBUMIN SERPL-MCNC: 4.8 G/DL (ref 3.4–5)
ALP SERPL-CCNC: 73 U/L (ref 40–129)
ALT SERPL-CCNC: 16 U/L (ref 10–40)
AST SERPL-CCNC: 19 U/L (ref 15–37)
BILIRUB DIRECT SERPL-MCNC: 0.2 MG/DL (ref 0–0.3)
BILIRUB INDIRECT SERPL-MCNC: 0.2 MG/DL (ref 0–1)
BILIRUB SERPL-MCNC: 0.4 MG/DL (ref 0–1)
CHOLEST SERPL-MCNC: 148 MG/DL (ref 0–199)
EST. AVERAGE GLUCOSE BLD GHB EST-MCNC: 171.4 MG/DL
HBA1C MFR BLD: 7.6 %
HDLC SERPL-MCNC: 40 MG/DL (ref 40–60)
LDLC SERPL CALC-MCNC: 82 MG/DL
PROT SERPL-MCNC: 7.3 G/DL (ref 6.4–8.2)
TRIGL SERPL-MCNC: 131 MG/DL (ref 0–150)
TSH SERPL DL<=0.005 MIU/L-ACNC: 2.37 UIU/ML (ref 0.27–4.2)
VIT B12 SERPL-MCNC: 739 PG/ML (ref 211–911)
VLDLC SERPL CALC-MCNC: 26 MG/DL

## 2024-12-02 ENCOUNTER — NURSE ONLY (OUTPATIENT)
Dept: CARDIOLOGY CLINIC | Age: 79
End: 2024-12-02

## 2024-12-02 NOTE — PROGRESS NOTES
Carelink Released To    [facility] - [facility] Devices    [phone]    02-Dec-2024    Patient Relocation    [id number]

## 2024-12-03 ENCOUNTER — TELEPHONE (OUTPATIENT)
Dept: FAMILY MEDICINE CLINIC | Age: 79
End: 2024-12-03

## 2024-12-03 DIAGNOSIS — B37.9 YEAST INFECTION: Primary | ICD-10-CM

## 2024-12-03 RX ORDER — FLUCONAZOLE 150 MG/1
150 TABLET ORAL ONCE
Qty: 1 TABLET | Refills: 0 | Status: SHIPPED | OUTPATIENT
Start: 2024-12-03 | End: 2024-12-03

## 2024-12-03 NOTE — TELEPHONE ENCOUNTER
Pt calling and is wanting to know if you could send over a rx for a yeast infection to [facility].

## 2025-01-03 ENCOUNTER — TELEPHONE (OUTPATIENT)
Dept: FAMILY MEDICINE CLINIC | Age: 80
End: 2025-01-03

## 2025-01-03 DIAGNOSIS — B37.31 VAGINAL YEAST INFECTION: ICD-10-CM

## 2025-01-03 RX ORDER — FLUCONAZOLE 100 MG/1
100 TABLET ORAL DAILY
Qty: 3 TABLET | Refills: 0 | Status: SHIPPED | OUTPATIENT
Start: 2025-01-03 | End: 2025-01-06

## 2025-01-03 NOTE — TELEPHONE ENCOUNTER
Pt states that she has another yeast infection.   She's on Jardiance and drinks a lot of water.  Feels like she's getting them at least once a month.  Requesting medication.    Uses [facility].

## 2025-01-03 NOTE — TELEPHONE ENCOUNTER
Ok rx sent.  Would recommend that patient discuss concerns of side effects of the medication with her PCP at upcoming appointment.

## 2025-01-07 ENCOUNTER — TELEPHONE (OUTPATIENT)
Dept: CARDIOLOGY CLINIC | Age: 80
End: 2025-01-07

## 2025-01-07 NOTE — TELEPHONE ENCOUNTER
Patient is scheduled for OV w/ [facility] next week - please confirm patient intends to come to this appointment - device care was realeased to [facility] early December.

## 2025-01-08 NOTE — TELEPHONE ENCOUNTER
Reached the patient and advised her to discuss S/E with Dr Marroquin.   Also, she already got her meds.   TY

## 2025-01-09 NOTE — TELEPHONE ENCOUNTER
Spoke with pt and she is leaving [facility] for [facility]. Pt's 1/15/25 appt is now cancelled.

## 2025-01-20 RX ORDER — ATORVASTATIN CALCIUM 40 MG/1
40 TABLET, FILM COATED ORAL DAILY
Qty: 90 TABLET | Refills: 1 | Status: SHIPPED | OUTPATIENT
Start: 2025-01-20

## 2025-02-14 ENCOUNTER — TELEPHONE (OUTPATIENT)
Dept: FAMILY MEDICINE CLINIC | Age: 80
End: 2025-02-14

## 2025-02-14 RX ORDER — FLUCONAZOLE 150 MG/1
150 TABLET ORAL
Qty: 2 TABLET | Refills: 0 | Status: SHIPPED | OUTPATIENT
Start: 2025-02-14 | End: 2025-02-20

## 2025-02-14 NOTE — TELEPHONE ENCOUNTER
Pt calling and asking if you could send in medication for a yeast infection to [facility] in [place].

## 2025-03-26 ENCOUNTER — RESULTS FOLLOW-UP (OUTPATIENT)
Dept: FAMILY MEDICINE CLINIC | Age: 80
End: 2025-03-26

## 2025-03-26 ENCOUNTER — HOSPITAL ENCOUNTER (OUTPATIENT)
Dept: MAMMOGRAPHY | Age: 80
Discharge: HOME | End: 2025-03-26
Payer: MEDICARE

## 2025-03-26 VITALS — WEIGHT: 154 LBS | HEIGHT: 64 IN | BODY MASS INDEX: 26.29 KG/M2

## 2025-03-26 DIAGNOSIS — Z12.31 SCREENING MAMMOGRAM, ENCOUNTER FOR: ICD-10-CM

## 2025-03-26 PROCEDURE — 77063 BREAST TOMOSYNTHESIS BI: CPT

## 2025-04-09 ENCOUNTER — TELEPHONE (OUTPATIENT)
Dept: FAMILY MEDICINE CLINIC | Age: 80
End: 2025-04-09

## 2025-04-09 RX ORDER — FLUCONAZOLE 150 MG/1
150 TABLET ORAL DAILY
Qty: 3 TABLET | Refills: 0 | Status: SHIPPED | OUTPATIENT
Start: 2025-04-09 | End: 2025-04-12

## 2025-04-09 NOTE — TELEPHONE ENCOUNTER
Patient requesting medication for yeast infection in private area. Please advise.       [facility]

## 2025-04-29 RX ORDER — GLIPIZIDE 10 MG/1
10 TABLET ORAL
Qty: 180 TABLET | Refills: 1 | Status: SHIPPED | OUTPATIENT
Start: 2025-04-29

## 2025-04-29 NOTE — TELEPHONE ENCOUNTER
Patient states she is taking 10 mg 1 tablet twice daily    Future appt 6/18/2025    Last appt 11/27

## 2025-06-05 NOTE — PROGRESS NOTES
Progress Note - Internal Medicine   Name: Blanca Mason 54 y.o. female I MRN: [id number]  Unit/Bed#: DIS05 I Date of Admission: 6/4/2025   Date of Service: 6/5/2025 I Hospital Day: 1    Assessment & Plan  Confusion  Patient reports confusion for approximately 2 months noticed by herself friends and family.  Her friends described as how she normally acts when she smokes marijuana but patient reports not smoking much marijuana recently.  She also reports deficits in short-term memory and executive functioning.  States she has been having some gait difficulties with possible increase in urinary frequency.  Nonfocal neurologic exam.  Broad differential.  CT head to evaluate for intracranial bleed given altered mental status and supratherapeutic INR as well as to evaluate for normal pressure hydrocephalus.  ABG for hypercapnia.  TSH and T4 test for endocrine abnormalities.  Will also check B12.  UA to assess for UTI.  UDS to assess for intoxication.  Believe the most likely cause of her symptoms is some combination of medication side effects given her extensive medication list and her polypharmacy.  Patient unable to recall what medication she takes as they come in a bubble pack.  Recommend medication reconciliation with pharmacy when pharmacy is open.  Consider psych consult given patient's unclear psychiatric history and multiple psychiatric medications which may be worsening her confusion.  CT head clear, ABG + lactic acidosis, but lactic acid resolved. Thyroid function clear. B12 clear. UA shows + nitrites. UDS + THC.   Septicemia less likely given does not meet SIRS criteria (only tachy to 100).  Per Dr. Quigley w/ psych, topiramate is most likely pharmacologic factor contributing to confusion, will wean ~20%/day. Will cont hold trazadone, latuda, austedo, and cogentin.  Trazodone can be restarted if insomnia if mental status improved.  Will not discharge on latuda, austedo, cogentin, given they are likely  Pt called and stated that they were running low grade fever and was going to PCP today.  Cx Cardiac appt 7/19/23 "unnecessary with [facility].   MRI brain.  Arrange outpt f/u w/ her psychiatrist.  Major depressive disorder  Schizoaffective disorder, bipolar type (HCC)  Patient on extensive psychiatric medications per chart review and unclear psychiatric history.  Patient is unable to give a definitive answer about which of these she is prescribed and is taking.  Recommend med rec with pharmacy during the day.  Polypharmacy may be playing into her confusion.  See \"Confusion\" plan  History of pulmonary embolism  History of DVT (deep vein thrombosis)  Maintained on warfarin with goal INR 2-3, was briefly on Lovenox injections before and after right foot/ankle orthopedic surgery.  She sees [facility] for anticoagulation management.  Supratherapeutic INR on presentation, will hold warfarin.  Can resume home dosing when INR downtrends/at goal.  Benign essential hypertension  On home losartan 100, clonidine 0.3 twice daily.  Blood pressure here normotensive, can consider holding home losartan.    Consider weaning clonidine to improve confusion, monitor for rebound HTN.   MAG (obstructive sleep apnea)  MAG on CPAP at home reports previous good compliance with intermittent compliance in the last 2 months.  Didn't have CPAP 6/4-6/5  CPAP while here, pt's father brought 6/5.   Sjogren's syndrome (HCC)  Continue pilocarpine for now  Substance abuse (HCC)  Currently smokes marijuana, reports prior meth use with last use 2 years ago per patient.  UDS + for THC.   Bilateral leg edema  Leg slightly warm but no convincing signs of cellulitis or active infection.  Negative lower extremity Doppler this admission.  Continue home Lasix.  Likely venous stasis, will start Ancef 2000mg q8 to address possibility of cellulitis.   Restless leg syndrome  Continue ropinirole  Hypothyroid  Check TSH, free T4.  Continue home Synthroid 125 mcg  TSH 3.3, Free T4 1.14  GERD (gastroesophageal reflux disease)  protonix  Asthma  COPD (chronic obstructive " pulmonary disease) (HCC)  Unsure if she truly has asthma, outpatient maintained on as needed albuterol which we will continue inpatient.  No PFTs on file.  Not in exacerbation on presentation.  Lactic acidosis (Resolved: 6/5/2025)  Lactic acidosis of unclear origin with initial 2.9, repeat 3.1.  No clear source of infection, not meeting SIRS criteria.  No clear evidence of endorgan dysfunction given that patient has been having confusion for multiple months.  No signs of limb or mesenteric ischemia.  Per chart review patient was at 1 point on metformin but is unclear what she is taking now.  Will also check B1 level.   Complete 2 L sepsis bolus ordered by ED and recheck lactic acid.  Post sepsis bolus, lactic acid now no longer elevated at 1.3.     Disposition: in pt     Team: SOD TEAM B    Subjective   Patient seen and examined. No acute events overnight. Pt reports no issues overnight. She reports that she still feels mildly confused, having issues with spelling and using her phone. She feels that she has to think deeply to complete basic tasks.     Objective :  Temp:  [97.9 °F (36.6 °C)] 97.9 °F (36.6 °C)  HR:  [] 102  BP: ()/(51-81) 107/51  Resp:  [17-20] 18  SpO2:  [93 %-97 %] 97 %  O2 Device: None (Room air)  Nasal Cannula O2 Flow Rate (L/min):  [2 L/min] 2 L/min    I/O       None          Weights:        There is no height or weight on file to calculate BMI.  Weight (last 2 days)       None            Physical Exam  Vitals and nursing note reviewed.   Constitutional:       General: She is not in acute distress.     Appearance: She is well-developed. She is not ill-appearing.   HENT:      Head: Normocephalic and atraumatic.     Eyes:      Conjunctiva/sclera: Conjunctivae normal.       Cardiovascular:      Rate and Rhythm: Normal rate and regular rhythm.      Heart sounds: No murmur heard.  Pulmonary:      Effort: Pulmonary effort is normal. No respiratory distress.      Breath sounds: Normal breath  sounds.   Abdominal:      General: There is no distension.      Palpations: Abdomen is soft.      Tenderness: There is no abdominal tenderness.     Musculoskeletal:         General: Signs of injury present. No swelling.      Cervical back: Neck supple.      Comments: Ecchymosis of RUE and boot of RLE     Skin:     General: Skin is warm and dry.      Capillary Refill: Capillary refill takes less than 2 seconds.     Neurological:      General: No focal deficit present.      Mental Status: She is alert and oriented to person, place, and time.     Psychiatric:         Mood and Affect: Mood normal.           Lab Results: I have reviewed the following results:  Recent Labs     06/04/25  1645 06/04/25  1855 06/04/25  2111 06/04/25  2159 06/05/25  0247 06/05/25  0518   WBC 10.16  --   --   --   --  8.00   HGB 13.8  --  13.9  --   --  11.7   HCT 41.7  --  41  --   --  36.3     --   --   --   --  190   SODIUM 139  --   --    < >  --  140   K 2.8*  --   --    < >  --  3.3*     --   --    < >  --  109*   CO2 23  --  21   < >  --  20*   BUN 3*  --   --    < >  --  3*   CREATININE 0.66  --   --    < >  --  0.43*   GLUC 119  --   --    < >  --  112   CAIONIZED  --   --  1.10*  --   --   --    AST 35  --   --   --   --   --    ALT 47  --   --   --   --   --    ALB 3.6  --   --   --   --   --    TBILI 0.57  --   --   --   --   --    ALKPHOS 83  --   --   --   --   --    PTT 55*  --   --   --   --  63*   INR 3.17*  --   --   --   --  3.66*   HSTNI0 8  --   --   --   --   --    HSTNI2  --  8  --   --   --   --    BNP 30  --   --   --   --   --    LACTICACID 2.9* 3.1*  --    < > 1.3  --     < > = values in this interval not displayed.             Currently Ordered Meds:   Current Facility-Administered Medications:     albuterol (PROVENTIL HFA,VENTOLIN HFA) inhaler 2 puff, Q6H PRN    atoMOXetine (STRATTERA) capsule 60 mg, Daily    atorvastatin (LIPITOR) tablet 80 mg, Daily    buPROPion (WELLBUTRIN XL) 24 hr tablet 450 mg,  Daily    cholecalciferol (VITAMIN D) oral liquid 400 Units, Daily    [Held by provider] cloNIDine (CATAPRES) tablet 0.3 mg, BID    cyanocobalamin (VITAMIN B-12) tablet 1,000 mcg, Daily    [Held by provider] furosemide (LASIX) tablet 20 mg, Daily    insulin lispro (HumALOG/ADMELOG) 100 units/mL subcutaneous injection 2-12 Units, TID AC **AND** Fingerstick Glucose (POCT), TID AC    levothyroxine tablet 125 mcg, Early Morning    loratadine (CLARITIN) tablet 5 mg, Daily    [Held by provider] losartan (COZAAR) tablet 100 mg, Daily    [Held by provider] naltrexone (REVIA) tablet 50 mg, Daily    naproxen (NAPROSYN) tablet 500 mg, BID With Meals    OXcarbazepine (TRILEPTAL) tablet 450 mg, Q12H TASHA    pantoprazole (PROTONIX) EC tablet 40 mg, Early Morning    pilocarpine (SALAGEN) tablet 5 mg, BID    potassium chloride (Klor-Con M20) CR tablet 40 mEq, Once    potassium chloride (Klor-Con M20) CR tablet 40 mEq, Once    potassium chloride 20 mEq IVPB (premix), Q2H    rOPINIRole (REQUIP) tablet 1 mg, HS    topiramate (TOPAMAX) tablet 200 mg, BID    [Held by provider] traZODone (DESYREL) tablet 50 mg, HS    [Held by provider] warfarin (COUMADIN) tablet 2 mg, Daily (warfarin)    [Held by provider] warfarin (COUMADIN) tablet 5 mg, Daily (warfarin)

## 2025-06-12 ENCOUNTER — TELEPHONE (OUTPATIENT)
Dept: FAMILY MEDICINE CLINIC | Age: 80
End: 2025-06-12

## 2025-06-12 RX ORDER — FLUCONAZOLE 150 MG/1
150 TABLET ORAL DAILY
Qty: 7 TABLET | Refills: 0 | Status: SHIPPED | OUTPATIENT
Start: 2025-06-12 | End: 2025-06-19

## 2025-06-18 ENCOUNTER — OFFICE VISIT (OUTPATIENT)
Dept: FAMILY MEDICINE CLINIC | Age: 80
End: 2025-06-18

## 2025-06-18 VITALS
SYSTOLIC BLOOD PRESSURE: 134 MMHG | HEART RATE: 69 BPM | BODY MASS INDEX: 26.78 KG/M2 | OXYGEN SATURATION: 98 % | WEIGHT: 156 LBS | DIASTOLIC BLOOD PRESSURE: 71 MMHG | TEMPERATURE: 69 F

## 2025-06-18 DIAGNOSIS — I42.9 CARDIOMYOPATHY, UNSPECIFIED TYPE (HCC): ICD-10-CM

## 2025-06-18 DIAGNOSIS — G60.3 IDIOPATHIC PROGRESSIVE NEUROPATHY: ICD-10-CM

## 2025-06-18 DIAGNOSIS — I44.2 COMPLETE HEART BLOCK (HCC): ICD-10-CM

## 2025-06-18 DIAGNOSIS — Z95.0 PACEMAKER: ICD-10-CM

## 2025-06-18 DIAGNOSIS — I48.0 PAROXYSMAL A-FIB (HCC): ICD-10-CM

## 2025-06-18 DIAGNOSIS — E11.42 TYPE 2 DIABETES MELLITUS WITH DIABETIC POLYNEUROPATHY, WITHOUT LONG-TERM CURRENT USE OF INSULIN (HCC): Primary | ICD-10-CM

## 2025-06-18 DIAGNOSIS — Z98.890 H/O VENTRICULAR SEPTAL MYECTOMY: ICD-10-CM

## 2025-06-18 DIAGNOSIS — I42.1 HOCM (HYPERTROPHIC OBSTRUCTIVE CARDIOMYOPATHY) (HCC): ICD-10-CM

## 2025-06-18 DIAGNOSIS — H60.332 ACUTE SWIMMER'S EAR OF LEFT SIDE: ICD-10-CM

## 2025-06-18 DIAGNOSIS — E55.9 VITAMIN D DEFICIENCY: ICD-10-CM

## 2025-06-18 DIAGNOSIS — R79.89 ELEVATED TSH: ICD-10-CM

## 2025-06-18 RX ORDER — AMOXICILLIN 500 MG/1
1000 CAPSULE ORAL 2 TIMES DAILY
Qty: 40 CAPSULE | Refills: 0 | Status: SHIPPED | OUTPATIENT
Start: 2025-06-18 | End: 2025-06-20 | Stop reason: SDUPTHER

## 2025-06-18 RX ORDER — NEOMYCIN SULFATE, POLYMYXIN B SULFATE AND HYDROCORTISONE 3.5; 10000; 1 MG/ML; [IU]/ML; MG/ML
4 SOLUTION AURICULAR (OTIC) 3 TIMES DAILY
Qty: 10 ML | Refills: 0 | Status: SHIPPED | OUTPATIENT
Start: 2025-06-18 | End: 2025-06-20 | Stop reason: SDUPTHER

## 2025-06-18 ASSESSMENT — PATIENT HEALTH QUESTIONNAIRE - PHQ9
SUM OF ALL RESPONSES TO PHQ QUESTIONS 1-9: 0
SUM OF ALL RESPONSES TO PHQ QUESTIONS 1-9: 0
2. FEELING DOWN, DEPRESSED OR HOPELESS: NOT AT ALL
1. LITTLE INTEREST OR PLEASURE IN DOING THINGS: NOT AT ALL
SUM OF ALL RESPONSES TO PHQ QUESTIONS 1-9: 0
SUM OF ALL RESPONSES TO PHQ QUESTIONS 1-9: 0

## 2025-06-18 NOTE — PROGRESS NOTES
Total time spent on the day of this patient's office encounter (including before, during, and after the face-to-face visit): 42 minutes    Subjective:  Stephanie Saunders is here to discuss the following issues.  She has diabetes and on her last office visit her hemoglobin A1c was above goal and Jardiance was increased to 25 mg.  No medicine side effects.  She is compliant with all medicines.  No polydipsia or polyuria.  No symptomatic low blood sugars.  She has a history of multiple chronic heart concerns including complete heart block.  This is being treated with pacemaker and it is monitored closely by cardiology.  Pulse rates typically run in the 60s.  No chest pain or pressure no tachycardia no lightheadedness nausea vomiting edema.  She has cardiomyopathy and had an a myomectomy for severe LVH and ventricular benign growth.  After recovery her shortness of breath and activity tolerance improved.  She has atrial fibrillation and continues on Eliquis and has not required rate control.  A-fib occurs intermittently with only very minimal symptoms.  She has elevated TSH with no skin or hair changes temperature intolerance bowel symptoms.  Has vitamin D deficiency and continues on her supplement as advised.  She has idiopathic progressive neuropathy and declines the need for additional treatment.  No abnormal gait or falls.  She complains of about 10 days of sinus pressure pain nasal discharge thick yellow sputum and a slight cough.  She has left ear congestion and discomfort.  She has a history of recurrent sinusitis.  She does not smoke  Social History     Tobacco Use   Smoking Status Former    Current packs/day: 0.00    Average packs/day: 1 pack/day for 44.2 years (44.2 ttl pk-yrs)    Types: Cigarettes    Start date:     Quit date: 3/15/2003    Years since quittin.2   Smokeless Tobacco Never   Tobacco Comments    Patient reported she quit for 10 years in the 's. Has now been smoke free since n2003.

## 2025-06-19 LAB
25(OH)D3 SERPL-MCNC: 61.5 NG/ML
ALT SERPL-CCNC: 18 U/L (ref 10–40)
ANION GAP SERPL CALCULATED.3IONS-SCNC: 12 MMOL/L (ref 3–16)
AST SERPL-CCNC: 19 U/L (ref 15–37)
BUN SERPL-MCNC: 16 MG/DL (ref 7–20)
CALCIUM SERPL-MCNC: 10.2 MG/DL (ref 8.3–10.6)
CHLORIDE SERPL-SCNC: 103 MMOL/L (ref 99–110)
CHOLEST SERPL-MCNC: 134 MG/DL (ref 0–199)
CO2 SERPL-SCNC: 24 MMOL/L (ref 21–32)
CREAT SERPL-MCNC: 1 MG/DL (ref 0.6–1.2)
CREAT UR-MCNC: 10 MG/DL (ref 28–259)
EST. AVERAGE GLUCOSE BLD GHB EST-MCNC: 165.7 MG/DL
GFR SERPLBLD CREATININE-BSD FMLA CKD-EPI: 57 ML/MIN/{1.73_M2}
GLUCOSE SERPL-MCNC: 145 MG/DL (ref 70–99)
HBA1C MFR BLD: 7.4 %
HDLC SERPL-MCNC: 37 MG/DL (ref 40–60)
LDLC SERPL CALC-MCNC: 78 MG/DL
MICROALBUMIN UR DL<=1MG/L-MCNC: 1.66 MG/DL
MICROALBUMIN/CREAT UR: 166 MG/G (ref 0–30)
POTASSIUM SERPL-SCNC: 4.8 MMOL/L (ref 3.5–5.1)
SODIUM SERPL-SCNC: 139 MMOL/L (ref 136–145)
TRIGL SERPL-MCNC: 93 MG/DL (ref 0–150)
TSH SERPL DL<=0.005 MIU/L-ACNC: 3.86 UIU/ML (ref 0.27–4.2)
VLDLC SERPL CALC-MCNC: 19 MG/DL

## 2025-06-20 RX ORDER — AMOXICILLIN 500 MG/1
1000 CAPSULE ORAL 2 TIMES DAILY
Qty: 40 CAPSULE | Refills: 0 | Status: SHIPPED | OUTPATIENT
Start: 2025-06-20 | End: 2025-06-30

## 2025-06-20 RX ORDER — NEOMYCIN SULFATE, POLYMYXIN B SULFATE AND HYDROCORTISONE 3.5; 10000; 1 MG/ML; [IU]/ML; MG/ML
4 SOLUTION AURICULAR (OTIC) 3 TIMES DAILY
Qty: 10 ML | Refills: 0 | Status: SHIPPED | OUTPATIENT
Start: 2025-06-20 | End: 2025-06-30

## 2025-06-21 ENCOUNTER — RESULTS FOLLOW-UP (OUTPATIENT)
Dept: FAMILY MEDICINE CLINIC | Age: 80
End: 2025-06-21

## 2025-07-15 ENCOUNTER — OFFICE VISIT (OUTPATIENT)
Dept: FAMILY MEDICINE CLINIC | Age: 80
End: 2025-07-15

## 2025-07-15 VITALS
DIASTOLIC BLOOD PRESSURE: 81 MMHG | WEIGHT: 155 LBS | OXYGEN SATURATION: 97 % | SYSTOLIC BLOOD PRESSURE: 145 MMHG | TEMPERATURE: 97.4 F | BODY MASS INDEX: 26.61 KG/M2 | HEART RATE: 71 BPM

## 2025-07-15 DIAGNOSIS — N89.8 VAGINAL ITCHING: ICD-10-CM

## 2025-07-15 DIAGNOSIS — R35.0 URINARY FREQUENCY: Primary | ICD-10-CM

## 2025-07-15 LAB
BILIRUBIN, POC: ABNORMAL
BLOOD URINE, POC: ABNORMAL
CLARITY, POC: CLEAR
COLOR, POC: ABNORMAL
GLUCOSE URINE, POC: 500 MG/DL
KETONES, POC: ABNORMAL MG/DL
LEUKOCYTE EST, POC: ABNORMAL
NITRITE, POC: ABNORMAL
PH, POC: 6.5
PROTEIN, POC: ABNORMAL MG/DL
SPECIFIC GRAVITY, POC: 1.01
UROBILINOGEN, POC: 0.2 MG/DL

## 2025-07-15 RX ORDER — FLUCONAZOLE 150 MG/1
150 TABLET ORAL
Qty: 2 TABLET | Refills: 0 | Status: SHIPPED | OUTPATIENT
Start: 2025-07-15 | End: 2025-07-21

## 2025-07-15 RX ORDER — CEPHALEXIN 500 MG/1
500 CAPSULE ORAL 2 TIMES DAILY
Qty: 20 CAPSULE | Refills: 0 | Status: SHIPPED | OUTPATIENT
Start: 2025-07-15 | End: 2025-07-25

## 2025-07-15 NOTE — PROGRESS NOTES
[facility]  7/15/2025    Stephanie Saunders (:  1945) is a 79 y.o. female, here for evaluation of the following medical concerns:    Chief Complaint   Patient presents with    Lower Back Pain    Abdominal Pain     Lower abdominal pain     Urinary Frequency        ASSESSMENT/ PLAN  1. Urinary frequency  Patient presents today with 2-day history of urinary frequency, low back pain, lower abdominal bloating, and vaginal itching.  Also notes her urine has been cloudy with strong odor.  Denies any fever, chills or sweats.  Reports normal bowel movements and appropriate appetite.  Has had frequent yeast infections since starting Jardiance if her water intake is not significant.  Physical exam revealed lower abdominal tenderness to palpation, right and left CVA tenderness.  Urinalysis with trace amount of blood but negative nitrites and leukocytes.  Given symptoms, suspect dilutional urinalysis.  Will initiate antibiotics as below and send urine for culture.  Patient given strict ED and return precautions.  Patient verbalized understanding and agreeable to plan.  - Culture, Urine  - cephALEXin (KEFLEX) 500 MG capsule; Take 1 capsule by mouth 2 times daily for 10 days  Dispense: 20 capsule; Refill: 0    2. Vaginal itching  Reports frequent yeast infection since starting Jardiance.  Encouraged increased p.o. intake.  Treatment as below.  - fluconazole (DIFLUCAN) 150 MG tablet; Take 1 tablet by mouth every 72 hours for 6 days  Dispense: 2 tablet; Refill: 0       Return if symptoms worsen or fail to improve.    HPI  Patient presents today with a 2-day history of urinary frequency, low back pain and lower abdominal bloating.  Reports urine has also been cloudy with a strong odor.  Denies any fever, chills or sweats.  Also reports yeast infection symptoms of vaginal itching-frequent yeast infections with Jardiance. denies any constipation with recent bowel movement this a.m.  Has been able to eat and drink

## 2025-07-17 LAB — BACTERIA UR CULT: NORMAL

## 2025-07-31 RX ORDER — ATORVASTATIN CALCIUM 40 MG/1
40 TABLET, FILM COATED ORAL DAILY
Qty: 90 TABLET | Refills: 1 | Status: SHIPPED | OUTPATIENT
Start: 2025-07-31

## 2025-08-08 RX ORDER — APIXABAN 5 MG/1
5 TABLET, FILM COATED ORAL 2 TIMES DAILY
Qty: 180 TABLET | Refills: 3 | Status: SHIPPED | OUTPATIENT
Start: 2025-08-08

## 2025-08-20 ENCOUNTER — TELEMEDICINE (OUTPATIENT)
Dept: FAMILY MEDICINE CLINIC | Age: 80
End: 2025-08-20

## 2025-08-20 DIAGNOSIS — Z00.00 MEDICARE ANNUAL WELLNESS VISIT, SUBSEQUENT: Primary | ICD-10-CM

## 2025-08-20 ASSESSMENT — PATIENT HEALTH QUESTIONNAIRE - PHQ9
SUM OF ALL RESPONSES TO PHQ QUESTIONS 1-9: 0
2. FEELING DOWN, DEPRESSED OR HOPELESS: NOT AT ALL
1. LITTLE INTEREST OR PLEASURE IN DOING THINGS: NOT AT ALL
SUM OF ALL RESPONSES TO PHQ QUESTIONS 1-9: 0

## 2025-08-29 ENCOUNTER — TELEPHONE (OUTPATIENT)
Dept: FAMILY MEDICINE CLINIC | Age: 80
End: 2025-08-29

## 2025-08-29 RX ORDER — FLUCONAZOLE 150 MG/1
150 TABLET ORAL
Qty: 2 TABLET | Refills: 0 | Status: SHIPPED | OUTPATIENT
Start: 2025-08-29 | End: 2025-09-04

## (undated) DEVICE — C315HIS02 OD7FR ID5.4FR L40CM C315

## (undated) DEVICE — SUTURE VICRYL SZ 4-0 L27IN ABSRB UD L26MM SH 1/2 CIR J415H

## (undated) DEVICE — NEPTUNE E-SEP SMOKE EVACUATION PENCIL, COATED, 70MM BLADE, PUSH BUTTON SWITCH: Brand: NEPTUNE E-SEP

## (undated) DEVICE — 3M™ STERI-STRIP™ REINFORCED ADHESIVE SKIN CLOSURES, R1547, 1/2 IN X 4 IN (12 MM X 100 MM), 6 STRIPS/ENVELOPE: Brand: 3M™ STERI-STRIP™

## (undated) DEVICE — Device

## (undated) DEVICE — RADIFOCUS GLIDEWIRE: Brand: GLIDEWIRE

## (undated) DEVICE — PACEMAKER PACK: Brand: MEDLINE INDUSTRIES, INC.

## (undated) DEVICE — SUTURE VICRYL + SZ 2-0 L27IN ABSRB VLT SH 1/2 CIR TAPERPOINT VCP317H

## (undated) DEVICE — INTRODUCER SHTH L13CM OD7FR SH ORNG HUB SEAMLESS SAFSHTH

## (undated) DEVICE — SUTURE PERMA-HAND SZ 0 L30IN NONABSORBABLE BLK L36MM CT-1 424H